# Patient Record
Sex: FEMALE | Race: BLACK OR AFRICAN AMERICAN | NOT HISPANIC OR LATINO | Employment: OTHER | URBAN - METROPOLITAN AREA
[De-identification: names, ages, dates, MRNs, and addresses within clinical notes are randomized per-mention and may not be internally consistent; named-entity substitution may affect disease eponyms.]

---

## 2018-10-31 ENCOUNTER — GYNECOLOGY VISIT (OUTPATIENT)
Dept: OBGYN | Facility: MEDICAL CENTER | Age: 68
End: 2018-10-31
Payer: MEDICARE

## 2018-10-31 ENCOUNTER — HOSPITAL ENCOUNTER (OUTPATIENT)
Dept: LAB | Facility: MEDICAL CENTER | Age: 68
End: 2018-10-31
Attending: OBSTETRICS & GYNECOLOGY
Payer: MEDICARE

## 2018-10-31 ENCOUNTER — HOSPITAL ENCOUNTER (OUTPATIENT)
Facility: MEDICAL CENTER | Age: 68
End: 2018-10-31
Attending: OBSTETRICS & GYNECOLOGY
Payer: MEDICARE

## 2018-10-31 VITALS
DIASTOLIC BLOOD PRESSURE: 110 MMHG | WEIGHT: 170 LBS | SYSTOLIC BLOOD PRESSURE: 170 MMHG | HEIGHT: 63 IN | BODY MASS INDEX: 30.12 KG/M2

## 2018-10-31 DIAGNOSIS — Z12.4 PAP SMEAR FOR CERVICAL CANCER SCREENING: ICD-10-CM

## 2018-10-31 DIAGNOSIS — Z12.39 SCREENING FOR MALIGNANT NEOPLASM OF BREAST: ICD-10-CM

## 2018-10-31 DIAGNOSIS — N95.0 POST-MENOPAUSAL BLEEDING: ICD-10-CM

## 2018-10-31 DIAGNOSIS — I10 HYPERTENSION, UNSPECIFIED TYPE: ICD-10-CM

## 2018-10-31 DIAGNOSIS — Z11.51 SCREENING FOR HUMAN PAPILLOMAVIRUS (HPV): ICD-10-CM

## 2018-10-31 DIAGNOSIS — Z11.3 SCREEN FOR STD (SEXUALLY TRANSMITTED DISEASE): ICD-10-CM

## 2018-10-31 LAB
ERYTHROCYTE [DISTWIDTH] IN BLOOD BY AUTOMATED COUNT: 43.2 FL (ref 35.9–50)
HCT VFR BLD AUTO: 34.9 % (ref 37–47)
HGB BLD-MCNC: 10.8 G/DL (ref 12–16)
MCH RBC QN AUTO: 23.1 PG (ref 27–33)
MCHC RBC AUTO-ENTMCNC: 30.9 G/DL (ref 33.6–35)
MCV RBC AUTO: 74.7 FL (ref 81.4–97.8)
PLATELET # BLD AUTO: 291 K/UL (ref 164–446)
PMV BLD AUTO: 9.9 FL (ref 9–12.9)
RBC # BLD AUTO: 4.67 M/UL (ref 4.2–5.4)
WBC # BLD AUTO: 6.4 K/UL (ref 4.8–10.8)

## 2018-10-31 PROCEDURE — 85027 COMPLETE CBC AUTOMATED: CPT

## 2018-10-31 PROCEDURE — 84443 ASSAY THYROID STIM HORMONE: CPT | Mod: GA

## 2018-10-31 PROCEDURE — 36415 COLL VENOUS BLD VENIPUNCTURE: CPT

## 2018-10-31 PROCEDURE — 87491 CHLMYD TRACH DNA AMP PROBE: CPT | Mod: GZ

## 2018-10-31 PROCEDURE — 88175 CYTOPATH C/V AUTO FLUID REDO: CPT

## 2018-10-31 PROCEDURE — 87624 HPV HI-RISK TYP POOLED RSLT: CPT

## 2018-10-31 PROCEDURE — 99204 OFFICE O/P NEW MOD 45 MIN: CPT | Performed by: OBSTETRICS & GYNECOLOGY

## 2018-10-31 PROCEDURE — 87591 N.GONORRHOEAE DNA AMP PROB: CPT | Mod: GZ

## 2018-10-31 NOTE — PROGRESS NOTES
"Subjective:      Graciela Desai is a 68 y.o. female who presents with New Patient (GYN Visit PMB/Hyperplasia)        CC: postmenopausal bleeding    HPI: 67 yo  menopausal female presents for evaluation of postmenopausal bleeding.  She is a very poor historian.  She states she has been bleeding since , and unsure if she ever went through menopause.  She has not had any bleeding over the past month. I have very limited records in EMR which state she has a diagnosis of complex endometrial hyperplasia without atypia.  She is unsure when this biopsy was done.  She states she has had a Mirena IUD within the past year, but had it removed due to heavy bleeding and pain shortly after it was inserted.      She denies hx of htn (but BP is 170/110 today).  She reports intermittent chest pain, sob, and headaches, but none currently.  Also reports being told she had \"pre-diabetes\".      ROS REVIEW OF SYSTEMS:    Pertinent positives and negatives mentioned in HPI.    All other systems reviewed and are negative or noncontributory.       Objective:     BP (!) 170/110 (BP Location: Left arm)   Ht 1.6 m (5' 3\")   Wt 77.1 kg (170 lb)   BMI 30.11 kg/m²      Physical Exam      GENERAL: Alert, in no apparent distress  PSYCHIATRIC: Appropriate affect, intact insight and judgement.    NECK:  Nontender, no masses.  No Thyromegaly or nodules. No lymphadenopathy.  RESPIRATORY: Normal respiratory effort.  Lungs clear to auscultation.   CARDIOVASCULAR: RRR, no murmur, gallop, or rub.  ABDOMEN: Soft, nontender, nondistended.  No palpable masses.  No rebound or guarding.  No inguinal lymphadenopathy.  No hepatosplenomegaly.  No hernias.  BACK: No CVA tenderness  EXTREMITIES: No edema  SKIN: No rash    BREAST: No masses or tenderness.  No skin changes.  No nipple inversion or discharge. No axillary lymphadenopathy.      GENITOURINARY:  Normal external genitalia, no lesions.  Normal urethral meatus, no masses or tenderness.  Normal bladder " without fullness or masses.  Vagina atrophic, no vaginal discharge or lesions.  Cervix without lesions or discharge, nontender.  Uterus 14 wks size,  Irregular contour, nontender.  Adnexa nontender, no masses.  Normal anus and perineum.    Rectal Exam - not indicated.       Assessment/Plan:     1. Post-menopausal bleeding  Appears to have had complex endometrial hyperplasia without atypia - I am unclear when the last biopsy was performed, and she appears to not be on any treatment other than the failed Mirena IUD.  I explained that we will need to do an embx, and she will likely need surgery.  She does not want surgery under any circumstances.   Will discuss further after biopsy results. I explained that there is a good chance she may have cancer of the endometrium.     - THINPREP PAP W/HPV AND CTNG; Future  - CBC WITHOUT DIFFERENTIAL; Future  - TSH; Future  - US-PELVIC COMPLETE (TRANSABDOMINAL/TRANSVAGINAL) (COMBO); Future  - REFERRAL TO GYNECOLOGY    2. Screening for human papillomavirus (HPV)  - THINPREP PAP W/HPV AND CTNG; Future    3. Pap smear for cervical cancer screening  - THINPREP PAP W/HPV AND CTNG; Future    4. Screen for STD (sexually transmitted disease)  - THINPREP PAP W/HPV AND CTNG; Future    5. Screening for malignant neoplasm of breast  - MA-SCREENING MAMMO BILAT W/TOMOSYNTHESIS W/CAD; Future    6. Hypertension, unspecified type  Urgent referral placed to primary care to establish care and treat hypertension.  - REFERRAL TO FOLLOW-UP WITH PRIMARY CARE    F/U 1 week for embx.

## 2018-10-31 NOTE — PROGRESS NOTES
Pt presents as new gyn pt to further evaluate PMB/Complex hyperplasia per records from [place]. Pt states she has been bleeding off and on since 2006, per records 2016. Pt denies med hx including HTN,states no current meds. Had mirena at some point had to be removed. Did not improve bleeding.

## 2018-11-01 DIAGNOSIS — N95.0 POST-MENOPAUSAL BLEEDING: ICD-10-CM

## 2018-11-01 DIAGNOSIS — Z11.51 SCREENING FOR HUMAN PAPILLOMAVIRUS (HPV): ICD-10-CM

## 2018-11-01 DIAGNOSIS — Z12.4 PAP SMEAR FOR CERVICAL CANCER SCREENING: ICD-10-CM

## 2018-11-01 DIAGNOSIS — Z11.3 SCREEN FOR STD (SEXUALLY TRANSMITTED DISEASE): ICD-10-CM

## 2018-11-01 LAB — TSH SERPL DL<=0.005 MIU/L-ACNC: 2.21 UIU/ML (ref 0.38–5.33)

## 2018-11-02 LAB
C TRACH DNA GENITAL QL NAA+PROBE: NEGATIVE
CYTOLOGY REG CYTOL: NORMAL
HPV HR 12 DNA CVX QL NAA+PROBE: NEGATIVE
HPV16 DNA SPEC QL NAA+PROBE: NEGATIVE
HPV18 DNA SPEC QL NAA+PROBE: NEGATIVE
N GONORRHOEA DNA GENITAL QL NAA+PROBE: NEGATIVE
SPECIMEN SOURCE: NORMAL
SPECIMEN SOURCE: NORMAL

## 2018-11-05 ENCOUNTER — APPOINTMENT (OUTPATIENT)
Dept: RADIOLOGY | Facility: MEDICAL CENTER | Age: 68
End: 2018-11-05
Attending: OBSTETRICS & GYNECOLOGY
Payer: MEDICARE

## 2018-11-07 ENCOUNTER — HOSPITAL ENCOUNTER (OUTPATIENT)
Dept: RADIOLOGY | Facility: MEDICAL CENTER | Age: 68
End: 2018-11-07
Attending: OBSTETRICS & GYNECOLOGY
Payer: MEDICARE

## 2018-11-07 DIAGNOSIS — Z12.39 SCREENING FOR MALIGNANT NEOPLASM OF BREAST: ICD-10-CM

## 2018-11-08 ENCOUNTER — HOSPITAL ENCOUNTER (OUTPATIENT)
Dept: RADIOLOGY | Facility: MEDICAL CENTER | Age: 68
End: 2018-11-08
Attending: OBSTETRICS & GYNECOLOGY
Payer: MEDICARE

## 2018-11-08 DIAGNOSIS — N95.0 POST-MENOPAUSAL BLEEDING: ICD-10-CM

## 2018-11-08 PROCEDURE — 76830 TRANSVAGINAL US NON-OB: CPT

## 2018-11-09 ENCOUNTER — OFFICE VISIT (OUTPATIENT)
Dept: MEDICAL GROUP | Facility: MEDICAL CENTER | Age: 68
End: 2018-11-09
Payer: MEDICARE

## 2018-11-09 ENCOUNTER — HOSPITAL ENCOUNTER (OUTPATIENT)
Dept: LAB | Facility: MEDICAL CENTER | Age: 68
End: 2018-11-09
Attending: NURSE PRACTITIONER
Payer: MEDICARE

## 2018-11-09 VITALS
HEIGHT: 63 IN | DIASTOLIC BLOOD PRESSURE: 92 MMHG | TEMPERATURE: 97.6 F | WEIGHT: 168 LBS | SYSTOLIC BLOOD PRESSURE: 162 MMHG | BODY MASS INDEX: 29.77 KG/M2 | OXYGEN SATURATION: 97 % | HEART RATE: 87 BPM | RESPIRATION RATE: 16 BRPM

## 2018-11-09 DIAGNOSIS — Z13.220 LIPID SCREENING: ICD-10-CM

## 2018-11-09 DIAGNOSIS — R73.03 PREDIABETES: ICD-10-CM

## 2018-11-09 DIAGNOSIS — I10 ESSENTIAL HYPERTENSION: ICD-10-CM

## 2018-11-09 DIAGNOSIS — R19.7 DIARRHEA, UNSPECIFIED TYPE: ICD-10-CM

## 2018-11-09 DIAGNOSIS — Z23 NEED FOR VACCINATION: ICD-10-CM

## 2018-11-09 LAB
ALBUMIN SERPL BCP-MCNC: 3.9 G/DL (ref 3.2–4.9)
ALBUMIN/GLOB SERPL: 1 G/DL
ALP SERPL-CCNC: 69 U/L (ref 30–99)
ALT SERPL-CCNC: 12 U/L (ref 2–50)
ANION GAP SERPL CALC-SCNC: 6 MMOL/L (ref 0–11.9)
AST SERPL-CCNC: 15 U/L (ref 12–45)
BILIRUB SERPL-MCNC: 0.4 MG/DL (ref 0.1–1.5)
BUN SERPL-MCNC: 21 MG/DL (ref 8–22)
CALCIUM SERPL-MCNC: 9.6 MG/DL (ref 8.5–10.5)
CHLORIDE SERPL-SCNC: 107 MMOL/L (ref 96–112)
CHOLEST SERPL-MCNC: 219 MG/DL (ref 100–199)
CO2 SERPL-SCNC: 28 MMOL/L (ref 20–33)
CREAT SERPL-MCNC: 1.08 MG/DL (ref 0.5–1.4)
CREAT UR-MCNC: 75.8 MG/DL
FASTING STATUS PATIENT QL REPORTED: NORMAL
GLOBULIN SER CALC-MCNC: 3.8 G/DL (ref 1.9–3.5)
GLUCOSE SERPL-MCNC: 96 MG/DL (ref 65–99)
HDLC SERPL-MCNC: 61 MG/DL
LDLC SERPL CALC-MCNC: 129 MG/DL
MICROALBUMIN UR-MCNC: <0.7 MG/DL
MICROALBUMIN/CREAT UR: NORMAL MG/G (ref 0–30)
POTASSIUM SERPL-SCNC: 4.4 MMOL/L (ref 3.6–5.5)
PROT SERPL-MCNC: 7.7 G/DL (ref 6–8.2)
SODIUM SERPL-SCNC: 141 MMOL/L (ref 135–145)
TRIGL SERPL-MCNC: 147 MG/DL (ref 0–149)

## 2018-11-09 PROCEDURE — 90662 IIV NO PRSV INCREASED AG IM: CPT | Performed by: NURSE PRACTITIONER

## 2018-11-09 PROCEDURE — 80061 LIPID PANEL: CPT

## 2018-11-09 PROCEDURE — 83036 HEMOGLOBIN GLYCOSYLATED A1C: CPT | Mod: GA

## 2018-11-09 PROCEDURE — 99204 OFFICE O/P NEW MOD 45 MIN: CPT | Mod: 25 | Performed by: NURSE PRACTITIONER

## 2018-11-09 PROCEDURE — G0009 ADMIN PNEUMOCOCCAL VACCINE: HCPCS | Performed by: NURSE PRACTITIONER

## 2018-11-09 PROCEDURE — 82570 ASSAY OF URINE CREATININE: CPT

## 2018-11-09 PROCEDURE — G0008 ADMIN INFLUENZA VIRUS VAC: HCPCS | Performed by: NURSE PRACTITIONER

## 2018-11-09 PROCEDURE — 82043 UR ALBUMIN QUANTITATIVE: CPT

## 2018-11-09 PROCEDURE — 80053 COMPREHEN METABOLIC PANEL: CPT

## 2018-11-09 PROCEDURE — 36415 COLL VENOUS BLD VENIPUNCTURE: CPT

## 2018-11-09 PROCEDURE — 90670 PCV13 VACCINE IM: CPT | Performed by: NURSE PRACTITIONER

## 2018-11-09 RX ORDER — LOSARTAN POTASSIUM 50 MG/1
50 TABLET ORAL DAILY
Qty: 90 TAB | Refills: 0 | Status: SHIPPED | OUTPATIENT
Start: 2018-11-09 | End: 2020-01-30 | Stop reason: SDUPTHER

## 2018-11-09 ASSESSMENT — PATIENT HEALTH QUESTIONNAIRE - PHQ9: CLINICAL INTERPRETATION OF PHQ2 SCORE: 0

## 2018-11-09 NOTE — PROGRESS NOTES
"Chief Complaint   Patient presents with   • Establish Care     PARASITE IN BODY            • Tingling     FINGERS AND TOES     Graciela Desai is a 68 y.o. female here to establish care    Essential hypertension  New diagnosis.  BP at visit last week 170/110, 162/92 in the office today mother with HTN  She is having headaches, sometimes dizziness and fatigue.  Denies chest pain, palpitations, epistaxis, activity intolerance    Prediabetes  Reports having been diagnosed with prediabetes in the past, she was never prescribed medication.  She has been working on her diet, active but no planned exercise.  Denies polyuria, polydipsia, numbness or tingling in extremities    Diarrhea  She reports loose stools 6-7 times daily, ongoing for the last few years.  She had colonoscopy in 2017 which was normal, records requested.  She voices concern about a \"parasite\", states that she feels it throughout her body, \"under her skin\", feels like a tingling sensation under her fingers.  No abdominal pain, bloating, blood or mucus in stool.  No recent travel outside of the [place].  No fever    She recently had labs which showed mild anemia.  She has been having some dysfunctional uterine bleeding, is being followed by the OB/GYN for this.  She states that her bleeding has stopped at this time  Normal thyroid level    Current medicines (including changes today)  Current Outpatient Prescriptions   Medication Sig Dispense Refill   • losartan (COZAAR) 50 MG Tab Take 1 Tab by mouth every day. 90 Tab 0     No current facility-administered medications for this visit.      She  has a past medical history of Hypertension.  She  has a past surgical history that includes primary c section and appendectomy.  Social History   Substance Use Topics   • Smoking status: Never Smoker   • Smokeless tobacco: Never Used   • Alcohol use No     Social History     Social History Narrative   • No narrative on file     Family History   Problem Relation Age of Onset " "  • Alcohol/Drug Mother    • Heart Disease Mother    • No Known Problems Father      Family Status   Relation Status   • Mo    • Fa (Not Specified)   • Bro Alive         ROS  Problems listed discussed above, all other systems reviewed and negative     Objective:     Blood pressure (!) 162/92, pulse 87, temperature 36.4 °C (97.6 °F), temperature source Temporal, resp. rate 16, height 1.6 m (5' 3\"), weight 76.2 kg (168 lb), SpO2 97 %. Body mass index is 29.76 kg/m².  Physical Exam:  General: Alert, oriented in no acute distress.  Eye contact is good, speech is normal, affect calm  HEENT: perrl, Oral mucosa pink moist, no lesions. Nares patent. TMs gray with good landmarks bilaterally. No cervical or supraclavicular lymphadenopathy, thyroid isthmus palpable without masses or nodules.  Lungs: clear to auscultation bilaterally, good aeration, normal effort. No wheeze/ rhonchi/ rales.  CV: regular rate and rhythm, S1, S2. No murmur, no JVD, no edema. PMI at 5th intercostal space midclavicular line. Pedal pulses 2 + bilaterally  Abdomen: soft, nontender, BS x4, no hepatosplenomegaly.  Ext: color normal, vascularity normal, temperature normal. No rash or lesions.  MS: No joint swelling or redness. Strength is 5/5 globally  Neuro: DTR 2+ bilaterally  Assessment and Plan:   The following treatment plan was discussed  1. Essential hypertension   uncontrolled, new diagnosis.  Start losartan obtain labs.  Follow-up in 2 weeks.  Discussed importance of regular exercise, sodium reduction, avoid alcohol and tobacco products.  COMP METABOLIC PANEL    MICROALBUMIN CREAT RATIO URINE    losartan (COZAAR) 50 MG Tab   2. Prediabetes   reportedly diagnosed with prediabetes in the past, no medications  HEMOGLOBIN A1C   3. Diarrhea, unspecified type   several loose stool daily, history of normal colonoscopy.  Records requested.  Stool testing as listed below.  Discussed keeping food log to identify potential triggers.  Explained " that parasitic infection is very unlikely  CULTURE STOOL    Complete O&P    CDIFF BY PCR   4. Lipid screening  Lipid Profile   5. Need for vaccination  I have placed the below orders and discussed them with an approved delegating provider. The MA is performing the below orders under the direction of Dr. Keane  Influenza Vaccine, High Dose (65+ Only)    Pneumococcal Conjugate Vaccine 13-Valent       Educated in proper administration of medication(s) ordered today including safety, possible SE, risks, benefits, rationale and alternatives to therapy.     Followup: Return in about 2 weeks (around 11/23/2018).             Please note that this dictation was created using voice recognition software. I have worked with consultants from the vendor as well as technical experts from [employer] to optimize the interface. I have made every reasonable attempt to correct obvious errors, but I expect that there are errors of grammar and possibly content that I did not discover before finalizing the note.

## 2018-11-09 NOTE — LETTER
[facility]  SAGE Butler.  [address]  [facility] [address]  Fax: [phone]   Authorization for Release/Disclosure of   Protected Health Information   Name: NOAH BATISTA : 1950 SSN: [id number]   Address: [address] [place] [address] Phone:    [phone] (home)    I authorize the entity listed below to release/disclose the PHI below to:   [facility]/ZOHAIB Butler and ZOHAIB Butler   Provider or Entity Name:  Dr Dominguez, GI specialist   Address   City, [facility], [place], [place] Phone:      Fax:     Reason for request: continuity of care   Information to be released:    [ x] LAST COLONOSCOPY,  including any PATH REPORT and follow-up  [  ] LAST FIT/COLOGUARD RESULT [  ] LAST DEXA  [  ] LAST MAMMOGRAM  [  ] LAST PAP  [  ] LAST LABS [  ] RETINA EXAM REPORT  [  ] IMMUNIZATION RECORDS  [  ] Release all info      [  ] Check here and initial the line next to each item to release ALL health information INCLUDING  _____ Care and treatment for drug and / or alcohol abuse  _____ HIV testing, infection status, or AIDS  _____ Genetic Testing    DATES OF SERVICE OR TIME PERIOD TO BE DISCLOSED: _____________  I understand and acknowledge that:  * This Authorization may be revoked at any time by you in writing, except if your health information has already been used or disclosed.  * Your health information that will be used or disclosed as a result of you signing this authorization could be re-disclosed by the recipient. If this occurs, your re-disclosed health information may no longer be protected by State or Federal laws.  * You may refuse to sign this Authorization. Your refusal will not affect your ability to obtain treatment.  * This Authorization becomes effective upon signing and will  on (date) __________.      If no date is indicated, this Authorization will  one (1) year from the signature date.    Name: Noah Batista    Signature:   Date:          11/9/2018       PLEASE FAX REQUESTED RECORDS BACK TO: [phone]

## 2018-11-09 NOTE — ASSESSMENT & PLAN NOTE
Reports having been diagnosed with prediabetes in the past, she was never prescribed medication.  She has been working on her diet, active but no planned exercise.  Denies polyuria, polydipsia, numbness or tingling in extremities

## 2018-11-09 NOTE — ASSESSMENT & PLAN NOTE
New diagnosis.  BP at visit last week 170/110, 162/92 in the office today mother with HTN  She is having headaches, sometimes dizziness and fatigue.  Denies chest pain, palpitations, epistaxis, activity intolerance

## 2018-11-09 NOTE — ASSESSMENT & PLAN NOTE
"She reports loose stools 6-7 times daily, ongoing for the last few years.  She had colonoscopy in 2017 which was normal, records requested.  She voices concern about a \"parasite\", states that she feels it throughout her body, \"under her skin\".  No abdominal pain, bloating, blood or mucus in stool.  No recent travel outside of the [place].  No fever  "

## 2018-11-10 LAB
EST. AVERAGE GLUCOSE BLD GHB EST-MCNC: 140 MG/DL
HBA1C MFR BLD: 6.5 % (ref 0–5.6)

## 2018-11-13 ENCOUNTER — HOSPITAL ENCOUNTER (OUTPATIENT)
Facility: MEDICAL CENTER | Age: 68
End: 2018-11-13
Attending: NURSE PRACTITIONER
Payer: MEDICARE

## 2018-11-13 DIAGNOSIS — R19.7 DIARRHEA, UNSPECIFIED TYPE: ICD-10-CM

## 2018-11-13 LAB
G LAMBLIA+C PARVUM AG STL QL RAPID: NORMAL
SIGNIFICANT IND 70042: NORMAL
SITE SITE: NORMAL
SOURCE SOURCE: NORMAL

## 2018-11-13 PROCEDURE — 87046 STOOL CULTR AEROBIC BACT EA: CPT

## 2018-11-13 PROCEDURE — 87329 GIARDIA AG IA: CPT

## 2018-11-13 PROCEDURE — 87899 AGENT NOS ASSAY W/OPTIC: CPT

## 2018-11-13 PROCEDURE — 87045 FECES CULTURE AEROBIC BACT: CPT

## 2018-11-13 PROCEDURE — 87493 C DIFF AMPLIFIED PROBE: CPT

## 2018-11-13 PROCEDURE — 87328 CRYPTOSPORIDIUM AG IA: CPT

## 2018-11-14 LAB
C DIFF DNA SPEC QL NAA+PROBE: NEGATIVE
C DIFF TOX GENS STL QL NAA+PROBE: NEGATIVE
E COLI SXT1+2 STL IA: NORMAL
SIGNIFICANT IND 70042: NORMAL
SITE SITE: NORMAL
SOURCE SOURCE: NORMAL

## 2018-11-16 LAB
BACTERIA STL CULT: NORMAL
E COLI SXT1+2 STL IA: NORMAL
SIGNIFICANT IND 70042: NORMAL
SITE SITE: NORMAL
SOURCE SOURCE: NORMAL

## 2018-11-19 ENCOUNTER — TELEPHONE (OUTPATIENT)
Dept: MEDICAL GROUP | Facility: MEDICAL CENTER | Age: 68
End: 2018-11-19

## 2018-11-19 NOTE — TELEPHONE ENCOUNTER
----- Message from ZOHAIB Butler sent at 11/16/2018 12:06 PM PST -----  Please inform pt all stool tests are normal. No indication of infectious cause for her diarrhea. We will discuss further at her visit 11/26

## 2018-11-26 ENCOUNTER — OFFICE VISIT (OUTPATIENT)
Dept: MEDICAL GROUP | Facility: MEDICAL CENTER | Age: 68
End: 2018-11-26
Payer: MEDICARE

## 2018-11-26 VITALS
RESPIRATION RATE: 16 BRPM | TEMPERATURE: 98 F | HEART RATE: 87 BPM | HEIGHT: 63 IN | OXYGEN SATURATION: 97 % | WEIGHT: 172.51 LBS | DIASTOLIC BLOOD PRESSURE: 90 MMHG | BODY MASS INDEX: 30.57 KG/M2 | SYSTOLIC BLOOD PRESSURE: 120 MMHG

## 2018-11-26 DIAGNOSIS — N18.30 STAGE 3 CHRONIC KIDNEY DISEASE (HCC): ICD-10-CM

## 2018-11-26 DIAGNOSIS — I10 ESSENTIAL HYPERTENSION: ICD-10-CM

## 2018-11-26 DIAGNOSIS — E78.00 PURE HYPERCHOLESTEROLEMIA: ICD-10-CM

## 2018-11-26 DIAGNOSIS — R73.03 PREDIABETES: ICD-10-CM

## 2018-11-26 PROCEDURE — 99214 OFFICE O/P EST MOD 30 MIN: CPT | Performed by: NURSE PRACTITIONER

## 2018-11-26 RX ORDER — HYDROCHLOROTHIAZIDE 12.5 MG/1
12.5 CAPSULE, GELATIN COATED ORAL DAILY
Qty: 90 CAP | Refills: 1 | Status: SHIPPED | OUTPATIENT
Start: 2018-11-26 | End: 2020-01-30 | Stop reason: SDUPTHER

## 2018-11-26 NOTE — ASSESSMENT & PLAN NOTE
She has started losartan 50 mg daily with significant improvement in her blood pressure although diastolic reading is still 90.  No chest pain, dizziness, palpitation

## 2018-11-26 NOTE — ASSESSMENT & PLAN NOTE
a1c 6.5  She is not following any particular diet.  Not exercising.  No polyuria, polydipsia, numbness or tingling in extremities

## 2018-11-26 NOTE — ASSESSMENT & PLAN NOTE
Component      Latest Ref Rng & Units 11/9/2018          11:17 AM   Cholesterol,Tot      100 - 199 mg/dL 219 (H)   Triglycerides      0 - 149 mg/dL 147   HDL      >=40 mg/dL 61   LDL      <100 mg/dL 129 (H)   States that she eats out on a regular basis, enjoys going to the [employer]

## 2018-11-26 NOTE — PROGRESS NOTES
"Subjective:     Chief Complaint   Patient presents with   • Follow-Up     Graciela Desai is a 68 y.o. female here today to follow up on:    Prediabetes  a1c 6.5  She is not following any particular diet.  Not exercising.  No polyuria, polydipsia, numbness or tingling in extremities    Essential hypertension  She has started losartan 50 mg daily with significant improvement in her blood pressure although diastolic reading is still 90.  No chest pain, dizziness, palpitation.  She does have some pedal edema    Pure hypercholesterolemia  Component      Latest Ref Rng & Units 11/9/2018          11:17 AM   Cholesterol,Tot      100 - 199 mg/dL 219 (H)   Triglycerides      0 - 149 mg/dL 147   HDL      >=40 mg/dL 61   LDL      <100 mg/dL 129 (H)   States that she eats out on a regular basis, enjoys going to the [employer]     Stage 3 chronic kidney disease (HCC)  Recent labs with GFR 51       Current medicines (including changes today)  Current Outpatient Prescriptions   Medication Sig Dispense Refill   • hydrochlorothiazide (MICROZIDE) 12.5 MG capsule Take 1 Cap by mouth every day. 90 Cap 1   • losartan (COZAAR) 50 MG Tab Take 1 Tab by mouth every day. 90 Tab 0     No current facility-administered medications for this visit.      She  has a past medical history of Hypertension.    ROS included above     Objective:     Blood pressure 120/90, pulse 87, temperature 36.7 °C (98 °F), temperature source Temporal, resp. rate 16, height 1.6 m (5' 3\"), weight 78.3 kg (172 lb 8.2 oz), SpO2 97 %. Body mass index is 30.56 kg/m².     Physical Exam:  General: Alert, oriented in no acute distress.  Eye contact is good, speech is normal, affect calm  Lungs: clear to auscultation bilaterally, normal effort, no wheeze/ rhonchi/ rales.  CV: regular rate and rhythm, S1, S2, no murmur.  1+ bilateral lower extremity edema  Abdomen: soft, nontender, no hepatosplenomegaly  Ext:  color normal, vascularity normal, temperature normal    Assessment " and Plan:   The following treatment plan was discussed   1. Prediabetes   counseled on diet, exercise, weight loss recommendations.  She is motivated to work on lifestyle factors.  We will plan for repeat labs in 6 months  COMP METABOLIC PANEL    HEMOGLOBIN A1C   2. Pure hypercholesterolemia   same as above  Lipid Profile   3. Essential hypertension   improved with losartan although not quite at goal.  She is experiencing some lower extremity edema, will add a low-dose of hydrochlorothiazide  hydrochlorothiazide (MICROZIDE) 12.5 MG capsule   4. Stage 3 chronic kidney disease (HCC)   advised to avoid NSAIDs.  Continue to monitor       Followup: 6 months with labs prior         Please note that this dictation was created using voice recognition software. I have worked with consultants from the vendor as well as technical experts from [employer] to optimize the interface. I have made every reasonable attempt to correct obvious errors, but I expect that there are errors of grammar and possibly content that I did not discover before finalizing the note.

## 2018-12-04 ENCOUNTER — HOSPITAL ENCOUNTER (OUTPATIENT)
Dept: RADIOLOGY | Facility: MEDICAL CENTER | Age: 68
End: 2018-12-04
Attending: OBSTETRICS & GYNECOLOGY
Payer: MEDICARE

## 2018-12-04 DIAGNOSIS — N63.10 LUMP OF RIGHT BREAST: ICD-10-CM

## 2018-12-04 DIAGNOSIS — N64.4 BREAST PAIN: ICD-10-CM

## 2018-12-04 PROCEDURE — 76642 ULTRASOUND BREAST LIMITED: CPT | Mod: RT

## 2018-12-04 PROCEDURE — G0279 TOMOSYNTHESIS, MAMMO: HCPCS

## 2019-05-16 ENCOUNTER — HOSPITAL ENCOUNTER (OUTPATIENT)
Dept: LAB | Facility: MEDICAL CENTER | Age: 69
End: 2019-05-16
Attending: NURSE PRACTITIONER
Payer: MEDICARE

## 2019-05-16 DIAGNOSIS — E78.00 PURE HYPERCHOLESTEROLEMIA: ICD-10-CM

## 2019-05-16 DIAGNOSIS — R73.03 PREDIABETES: ICD-10-CM

## 2019-05-16 LAB
EST. AVERAGE GLUCOSE BLD GHB EST-MCNC: 140 MG/DL
HBA1C MFR BLD: 6.5 % (ref 0–5.6)

## 2019-05-16 PROCEDURE — 80053 COMPREHEN METABOLIC PANEL: CPT

## 2019-05-16 PROCEDURE — 80061 LIPID PANEL: CPT

## 2019-05-16 PROCEDURE — 36415 COLL VENOUS BLD VENIPUNCTURE: CPT

## 2019-05-16 PROCEDURE — 83036 HEMOGLOBIN GLYCOSYLATED A1C: CPT

## 2019-05-17 LAB
ALBUMIN SERPL BCP-MCNC: 3.8 G/DL (ref 3.2–4.9)
ALBUMIN/GLOB SERPL: 1.1 G/DL
ALP SERPL-CCNC: 76 U/L (ref 30–99)
ALT SERPL-CCNC: 25 U/L (ref 2–50)
ANION GAP SERPL CALC-SCNC: 5 MMOL/L (ref 0–11.9)
AST SERPL-CCNC: 32 U/L (ref 12–45)
BILIRUB SERPL-MCNC: 0.6 MG/DL (ref 0.1–1.5)
BUN SERPL-MCNC: 10 MG/DL (ref 8–22)
CALCIUM SERPL-MCNC: 9.3 MG/DL (ref 8.5–10.5)
CHLORIDE SERPL-SCNC: 107 MMOL/L (ref 96–112)
CHOLEST SERPL-MCNC: 205 MG/DL (ref 100–199)
CO2 SERPL-SCNC: 29 MMOL/L (ref 20–33)
CREAT SERPL-MCNC: 0.91 MG/DL (ref 0.5–1.4)
FASTING STATUS PATIENT QL REPORTED: NORMAL
GLOBULIN SER CALC-MCNC: 3.4 G/DL (ref 1.9–3.5)
GLUCOSE SERPL-MCNC: 92 MG/DL (ref 65–99)
HDLC SERPL-MCNC: 62 MG/DL
LDLC SERPL CALC-MCNC: 116 MG/DL
POTASSIUM SERPL-SCNC: 3.1 MMOL/L (ref 3.6–5.5)
PROT SERPL-MCNC: 7.2 G/DL (ref 6–8.2)
SODIUM SERPL-SCNC: 141 MMOL/L (ref 135–145)
TRIGL SERPL-MCNC: 134 MG/DL (ref 0–149)

## 2019-05-19 ENCOUNTER — HOSPITAL ENCOUNTER (EMERGENCY)
Facility: MEDICAL CENTER | Age: 69
End: 2019-05-19
Attending: EMERGENCY MEDICINE
Payer: MEDICARE

## 2019-05-19 VITALS
WEIGHT: 165.34 LBS | RESPIRATION RATE: 18 BRPM | DIASTOLIC BLOOD PRESSURE: 98 MMHG | TEMPERATURE: 97.5 F | HEART RATE: 94 BPM | SYSTOLIC BLOOD PRESSURE: 135 MMHG | BODY MASS INDEX: 33.33 KG/M2 | OXYGEN SATURATION: 98 % | HEIGHT: 59 IN

## 2019-05-19 DIAGNOSIS — T78.40XA ALLERGIC REACTION, INITIAL ENCOUNTER: ICD-10-CM

## 2019-05-19 PROCEDURE — 700102 HCHG RX REV CODE 250 W/ 637 OVERRIDE(OP): Performed by: EMERGENCY MEDICINE

## 2019-05-19 PROCEDURE — A9270 NON-COVERED ITEM OR SERVICE: HCPCS | Performed by: EMERGENCY MEDICINE

## 2019-05-19 PROCEDURE — 99283 EMERGENCY DEPT VISIT LOW MDM: CPT

## 2019-05-19 RX ORDER — DIPHENHYDRAMINE HCL 25 MG
25 TABLET ORAL ONCE
Status: COMPLETED | OUTPATIENT
Start: 2019-05-19 | End: 2019-05-19

## 2019-05-19 RX ADMIN — DIPHENHYDRAMINE HCL 25 MG: 25 TABLET ORAL at 10:36

## 2019-05-19 NOTE — ED PROVIDER NOTES
"ED Provider Note    CHIEF COMPLAINT  Chief Complaint   Patient presents with   • Rash     all over, since yesterday, itchy patches   • Facial Swelling     left eye       HPI  Graciela Desai is a 69 y.o. female who presents with itching all over her body and swelling around the left eye.  Symptoms started yesterday.  She feels itchiness over her shoulders arms upper chest and thighs.  She states that she noticed some lumps or hives, but these seem to be improving.  When she woke up this morning she had some mild swelling around the left eye and therefore came in.  No eye pain.  No trauma.  No swelling of her lips or tongue.  No difficulty breathing or cough.  She is currently on hydrochlorothiazide and losartan she reports she has been on these for over a year.  No new medications or new exposures.  She [place] a casino [place] and thinks that she might of been around something she is allergic to.  She did have some peanut butter.  No other nuts or berries.  No shellfish.    REVIEW OF SYSTEMS  Pertinent negative: As above.    PAST MEDICAL HISTORY  Past Medical History:   Diagnosis Date   • Hypertension        SOCIAL HISTORY  Social History   Substance Use Topics   • Smoking status: Never Smoker   • Smokeless tobacco: Never Used   • Alcohol use No       SURGICAL HISTORY  Past Surgical History:   Procedure Laterality Date   • APPENDECTOMY     • PRIMARY C SECTION         ALLERGIES  No Known Allergies    PHYSICAL EXAM  VITAL SIGNS: /90   Pulse 93   Temp 36.4 °C (97.5 °F) (Temporal)   Resp 17   Ht 1.499 m (4' 11\")   Wt 75 kg (165 lb 5.5 oz)   SpO2 97%   BMI 33.40 kg/m²    Constitutional: Awake and alert. Nontoxic  HENT: Oropharynx is benign.  No edema of the lips or tongue or mucous membranes.  Eyes: Minimal swelling of the left upper and left lower eyelid.  The conjunctive is normal.  Right eye is normal.  Pupils equal round and reactive  Neck: Normal range of motion  Cardiovascular: Normal heart rate "   Thorax & Lungs: No respiratory distress, lung fields are clear without wheezes rales or rhonchi  Abdomen: Nontender  Skin: She has a few urticarial wheals of her bilateral thighs.  Describes itchiness of the upper extremities although no rashes appreciated.  No rash over the chest or face.  Extremities: Well perfused  Psychiatric: Affect normal    COURSE & MEDICAL DECISION MAKING  Patient presents with signs and symptoms most compatible with an acute allergic reaction.  Etiology of her reaction is not clear.  She has been on her current medications for quite some time now.  Perhaps it is related to the peanut butter.  One cannot say.  At this point she states that her rash is getting better.  I can only see a couple hives on bilateral thighs.  I do not see any other rash on the extremities chest or back.  She does not have any swelling of her lips tongue or mouth.  No wheezing.  There is some mild edema around the left eye and this will be treated with Benadryl.  Of advised 25 mg Benadryl every 4-6 hours over the next 2 days.  She actually has an appointment with her primary provider tomorrow.  I have encouraged her to keep this.  I precautioned her to return the ER for any swelling of the lips, debility breathing, worsening symptoms, not improving or concern.    FINAL IMPRESSION  1.  Acute allergic reaction      Disposition: home in good condition      This dictation was created using voice recognition software. The accuracy of the dictation is limited to the abilities of the software.  The nursing notes were reviewed and certain aspects of this information were incorporated into this note.      Electronically signed by: Pk Iniguez, 5/19/2019 10:36 AM

## 2019-05-19 NOTE — ED TRIAGE NOTES
Pt amb to triage.  Chief Complaint   Patient presents with   • Rash     all over, since yesterday, itchy patches   • Facial Swelling     left eye     States she was at a casino yesterday and is concerned she was exposed to something.

## 2019-05-19 NOTE — ED NOTES
The patient states her pain and itching has decreased. No acute distress noted. Respirations non labored. Skin warm and dry. Patient ambulatory with steady gait to waiting room to await ride home from .

## 2019-05-20 ENCOUNTER — APPOINTMENT (OUTPATIENT)
Dept: MEDICAL GROUP | Facility: MEDICAL CENTER | Age: 69
End: 2019-05-20
Payer: MEDICARE

## 2020-01-30 ENCOUNTER — OFFICE VISIT (OUTPATIENT)
Dept: MEDICAL GROUP | Facility: MEDICAL CENTER | Age: 70
End: 2020-01-30
Payer: MEDICARE

## 2020-01-30 ENCOUNTER — HOSPITAL ENCOUNTER (OUTPATIENT)
Dept: LAB | Facility: MEDICAL CENTER | Age: 70
End: 2020-01-30
Attending: FAMILY MEDICINE
Payer: MEDICARE

## 2020-01-30 VITALS
BODY MASS INDEX: 32.2 KG/M2 | TEMPERATURE: 98 F | SYSTOLIC BLOOD PRESSURE: 160 MMHG | RESPIRATION RATE: 12 BRPM | DIASTOLIC BLOOD PRESSURE: 94 MMHG | OXYGEN SATURATION: 95 % | WEIGHT: 164 LBS | HEART RATE: 88 BPM | HEIGHT: 60 IN

## 2020-01-30 DIAGNOSIS — Z23 NEED FOR VACCINATION: ICD-10-CM

## 2020-01-30 DIAGNOSIS — I10 ESSENTIAL HYPERTENSION: ICD-10-CM

## 2020-01-30 DIAGNOSIS — Z12.11 COLON CANCER SCREENING: ICD-10-CM

## 2020-01-30 DIAGNOSIS — E66.9 OBESITY (BMI 30.0-34.9): ICD-10-CM

## 2020-01-30 DIAGNOSIS — E78.2 MIXED HYPERLIPIDEMIA: ICD-10-CM

## 2020-01-30 DIAGNOSIS — R73.03 PREDIABETES: ICD-10-CM

## 2020-01-30 DIAGNOSIS — Z12.39 BREAST CANCER SCREENING: ICD-10-CM

## 2020-01-30 DIAGNOSIS — Z00.00 HEALTHCARE MAINTENANCE: ICD-10-CM

## 2020-01-30 LAB
ALBUMIN SERPL BCP-MCNC: 4 G/DL (ref 3.2–4.9)
ALBUMIN/GLOB SERPL: 1 G/DL
ALP SERPL-CCNC: 95 U/L (ref 30–99)
ALT SERPL-CCNC: 18 U/L (ref 2–50)
ANION GAP SERPL CALC-SCNC: 10 MMOL/L (ref 0–11.9)
AST SERPL-CCNC: 21 U/L (ref 12–45)
BASOPHILS # BLD AUTO: 1.1 % (ref 0–1.8)
BASOPHILS # BLD: 0.06 K/UL (ref 0–0.12)
BILIRUB SERPL-MCNC: 0.5 MG/DL (ref 0.1–1.5)
BUN SERPL-MCNC: 25 MG/DL (ref 8–22)
CALCIUM SERPL-MCNC: 9.7 MG/DL (ref 8.5–10.5)
CHLORIDE SERPL-SCNC: 108 MMOL/L (ref 96–112)
CHOLEST SERPL-MCNC: 204 MG/DL (ref 100–199)
CO2 SERPL-SCNC: 25 MMOL/L (ref 20–33)
CREAT SERPL-MCNC: 1 MG/DL (ref 0.5–1.4)
EOSINOPHIL # BLD AUTO: 0.12 K/UL (ref 0–0.51)
EOSINOPHIL NFR BLD: 2.1 % (ref 0–6.9)
ERYTHROCYTE [DISTWIDTH] IN BLOOD BY AUTOMATED COUNT: 42.6 FL (ref 35.9–50)
EST. AVERAGE GLUCOSE BLD GHB EST-MCNC: 128 MG/DL
GLOBULIN SER CALC-MCNC: 4 G/DL (ref 1.9–3.5)
GLUCOSE SERPL-MCNC: 114 MG/DL (ref 65–99)
HBA1C MFR BLD: 6.1 % (ref 0–5.6)
HCT VFR BLD AUTO: 38.3 % (ref 37–47)
HDLC SERPL-MCNC: 60 MG/DL
HGB BLD-MCNC: 11.7 G/DL (ref 12–16)
IMM GRANULOCYTES # BLD AUTO: 0.01 K/UL (ref 0–0.11)
IMM GRANULOCYTES NFR BLD AUTO: 0.2 % (ref 0–0.9)
LDLC SERPL CALC-MCNC: 122 MG/DL
LYMPHOCYTES # BLD AUTO: 1.33 K/UL (ref 1–4.8)
LYMPHOCYTES NFR BLD: 23.6 % (ref 22–41)
MCH RBC QN AUTO: 22.9 PG (ref 27–33)
MCHC RBC AUTO-ENTMCNC: 30.5 G/DL (ref 33.6–35)
MCV RBC AUTO: 74.8 FL (ref 81.4–97.8)
MONOCYTES # BLD AUTO: 0.44 K/UL (ref 0–0.85)
MONOCYTES NFR BLD AUTO: 7.8 % (ref 0–13.4)
NEUTROPHILS # BLD AUTO: 3.68 K/UL (ref 2–7.15)
NEUTROPHILS NFR BLD: 65.2 % (ref 44–72)
NRBC # BLD AUTO: 0 K/UL
NRBC BLD-RTO: 0 /100 WBC
PLATELET # BLD AUTO: 290 K/UL (ref 164–446)
PMV BLD AUTO: 9.4 FL (ref 9–12.9)
POTASSIUM SERPL-SCNC: 3.9 MMOL/L (ref 3.6–5.5)
PROT SERPL-MCNC: 8 G/DL (ref 6–8.2)
RBC # BLD AUTO: 5.12 M/UL (ref 4.2–5.4)
SODIUM SERPL-SCNC: 143 MMOL/L (ref 135–145)
TRIGL SERPL-MCNC: 108 MG/DL (ref 0–149)
TSH SERPL DL<=0.005 MIU/L-ACNC: 1.94 UIU/ML (ref 0.38–5.33)
WBC # BLD AUTO: 5.6 K/UL (ref 4.8–10.8)

## 2020-01-30 PROCEDURE — 80053 COMPREHEN METABOLIC PANEL: CPT

## 2020-01-30 PROCEDURE — 83036 HEMOGLOBIN GLYCOSYLATED A1C: CPT | Mod: GA

## 2020-01-30 PROCEDURE — 90732 PPSV23 VACC 2 YRS+ SUBQ/IM: CPT | Performed by: FAMILY MEDICINE

## 2020-01-30 PROCEDURE — G0009 ADMIN PNEUMOCOCCAL VACCINE: HCPCS | Performed by: FAMILY MEDICINE

## 2020-01-30 PROCEDURE — 84443 ASSAY THYROID STIM HORMONE: CPT

## 2020-01-30 PROCEDURE — 85025 COMPLETE CBC W/AUTO DIFF WBC: CPT

## 2020-01-30 PROCEDURE — 36415 COLL VENOUS BLD VENIPUNCTURE: CPT

## 2020-01-30 PROCEDURE — 90662 IIV NO PRSV INCREASED AG IM: CPT | Performed by: FAMILY MEDICINE

## 2020-01-30 PROCEDURE — 99204 OFFICE O/P NEW MOD 45 MIN: CPT | Mod: 25 | Performed by: FAMILY MEDICINE

## 2020-01-30 PROCEDURE — 80061 LIPID PANEL: CPT

## 2020-01-30 PROCEDURE — G0008 ADMIN INFLUENZA VIRUS VAC: HCPCS | Performed by: FAMILY MEDICINE

## 2020-01-30 RX ORDER — PRAVASTATIN SODIUM 10 MG
10 TABLET ORAL DAILY
Qty: 90 TAB | Refills: 2 | Status: ON HOLD | OUTPATIENT
Start: 2020-01-30 | End: 2021-04-09

## 2020-01-30 RX ORDER — HYDROCHLOROTHIAZIDE 12.5 MG/1
12.5 CAPSULE, GELATIN COATED ORAL DAILY
Qty: 90 CAP | Refills: 2 | Status: ON HOLD | OUTPATIENT
Start: 2020-01-30 | End: 2021-04-09

## 2020-01-30 RX ORDER — LOSARTAN POTASSIUM 50 MG/1
50 TABLET ORAL DAILY
Qty: 90 TAB | Refills: 2 | Status: ON HOLD | OUTPATIENT
Start: 2020-01-30 | End: 2021-04-09

## 2020-01-30 ASSESSMENT — PATIENT HEALTH QUESTIONNAIRE - PHQ9: CLINICAL INTERPRETATION OF PHQ2 SCORE: 0

## 2020-01-30 NOTE — PROGRESS NOTES
CC: New patient: Hypertension, prediabetes, hyperlipidemia,    HPI:  Graciela presents today to establish new PCP.    Patient has been active and independent with all ADLs.  Has the following chronic medical issues:    Essential hypertension  Blood pressure today is high.  However patient has been asymptomatic.  Denies any headache, chest pain, shortness of breath. Patient ran out of her blood pressure medication, needs refill.  She has been on hydrochlorothiazide 12.5 mg daily, and losartan 50 mg daily.  No side effects    Prediabetes  However last A1c was 6.5.  No history of diabetes.  Denies polyuria and polydipsia.  Patient is concerned about diabetes, has a strong family history of diabetes, and she promised to try to eat healthy.    Mixed hyperlipidemia  Last lipid panel was checked in May 2019:   Ref Range & Units 8mo ago   Cholesterol,Tot 100 - 199 mg/dL 205High     Triglycerides 0 - 149 mg/dL 134    HDL >=40 mg/dL 62    LDL <100 mg/dL 116High     Resulting Agency  [facility]     10 years cardiac risk is 17.1%.  Discussed with patient statin, and she agrees to take it.  No history of diabetes, however her last A1c was 6.5, no history of CAD or stroke.    Obesity (BMI 30.0-34.9)  BMI 32.03. Patient is counseled about the medical rationale for weight loss in obese individuals is that obesity is associated with a significant increase in mortality, and many health risks including type 2 diabetes mellitus, hypertension, dyslipidemia, and coronary heart disease. The benefits of weight loss include a reduction in the rate of progression from impaired glucose tolerance to diabetes, blood pressure in hypertensive patients, and lipid levels in higher risk patients. Other noncardiac benefits of weight loss include reductions in urinary incontinence, sleep apnea, and depression, and improvements in quality of life, physical functioning, and mobility.Recommend lifestyle modification: exercise 30 minutes per day 5 days per week.  Recommend also portion control.    Patient is due for mammogram, colonoscopy, flu and pneumonia shot.  Discussed bone density next visit.    Patient Active Problem List    Diagnosis Date Noted   • Pure hypercholesterolemia 2018   • Stage 3 chronic kidney disease (HCC) 2018   • Essential hypertension 2018   • Prediabetes 2018   • Diarrhea 2018       Current Outpatient Medications   Medication Sig Dispense Refill   • pravastatin (PRAVACHOL) 10 MG Tab Take 1 Tab by mouth every day. 90 Tab 2   • hydrochlorothiazide (MICROZIDE) 12.5 MG capsule Take 1 Cap by mouth every day. 90 Cap 2   • losartan (COZAAR) 50 MG Tab Take 1 Tab by mouth every day. 90 Tab 2     No current facility-administered medications for this visit.          Allergies as of 2020   • (No Known Allergies)        Social History     Socioeconomic History   • Marital status:      Spouse name: Not on file   • Number of children: Not on file   • Years of education: Not on file   • Highest education level: Not on file   Occupational History   • Not on file   Social Needs   • Financial resource strain: Not on file   • Food insecurity:     Worry: Not on file     Inability: Not on file   • Transportation needs:     Medical: Not on file     Non-medical: Not on file   Tobacco Use   • Smoking status: Former Smoker     Packs/day: 2.00     Years: 1.00     Pack years: 2.00     Types: Cigarettes     Start date: 1996     Last attempt to quit: 1997     Years since quittin.0   • Smokeless tobacco: Never Used   Substance and Sexual Activity   • Alcohol use: No   • Drug use: No   • Sexual activity: Not on file   Lifestyle   • Physical activity:     Days per week: Not on file     Minutes per session: Not on file   • Stress: Not on file   Relationships   • Social connections:     Talks on phone: Not on file     Gets together: Not on file     Attends [employer] service: Not on file     Active member of club or organization:  Not on file     Attends meetings of clubs or organizations: Not on file     Relationship status: Not on file   • Intimate partner violence:     Fear of current or ex partner: Not on file     Emotionally abused: Not on file     Physically abused: Not on file     Forced sexual activity: Not on file   Other Topics Concern   • Not on file   Social History Narrative   • Not on file       Family History   Problem Relation Age of Onset   • Alcohol/Drug Mother    • Heart Disease Mother    • No Known Problems Father        Past Surgical History:   Procedure Laterality Date   • APPENDECTOMY     • PRIMARY C SECTION         ROS:  Denies any Headache, Blurred Vision, Confusion Chest pain,  Shortness of breath,  Abdominal pain, Changes of bowel or bladder, Lower ext edema, Fevers, Nights sweats, Weight Changes, Focal weakness or numbness.  All other systems are negative.    /94 (BP Location: Left arm, Patient Position: Sitting)   Pulse 88   Temp 36.7 °C (98 °F) (Temporal)   Resp 12   Ht 1.524 m (5') Comment: per patient  Wt 74.4 kg (164 lb)   SpO2 95%   BMI 32.03 kg/m²     Physical Exam:  Gen:         Alert and oriented, No apparent distress.  HEENT:   Perrla, TM clear,  Oralpharynx no erythema or exudates.  Neck:       No Jugular venous distension, Lymphadenopathy, Thyromegaly, Bruits.  Lungs:     Clear to auscultation bilaterally  CV:          Regular rate and rhythm. No murmurs, rubs or gallops.  Abd:         Soft non tender, non distended. Normal active bowel sounds. No                                        Hepatosplenomegaly, No pulsatile masses.  Ext:          No clubbing, cyanosis, edema.      Assessment and Plan.   70 y.o. female     1. Essential hypertension  Elevated.  Probably because she has not been taking her blood pressure medication.  Patient ran out of her blood pressure medication, needs refill.  Continue hydrochlorothiazide 12.5 mg daily, and losartan 50 mg daily.  We will continue monitor blood  pressure.    - CBC WITH DIFFERENTIAL; Future  - Comp Metabolic Panel; Future  - Lipid Profile; Future  - TSH; Future  - hydrochlorothiazide (MICROZIDE) 12.5 MG capsule; Take 1 Cap by mouth every day.  Dispense: 90 Cap; Refill: 2  - losartan (COZAAR) 50 MG Tab; Take 1 Tab by mouth every day.  Dispense: 90 Tab; Refill: 2    2. Prediabetes  Last A1c was 6.5.  No history of diabetes.  Patient is counseled on lifestyle medication.  Continue monitor blood glucose.    - Comp Metabolic Panel; Future  - HEMOGLOBIN A1C; Future  - Lipid Profile; Future    3. Mixed hyperlipidemia  Patient has high total cholesterol and LDL, 10 years cardiac risk is 17.1%.  Will start patient on pravastatin 10 mg, side effects of medication discussed.  Will check lipid panel and liver function in few months.    - Lipid Profile; Future  - TSH; Future  - pravastatin (PRAVACHOL) 10 MG Tab; Take 1 Tab by mouth every day.  Dispense: 90 Tab; Refill: 2    4. Healthcare maintenance  Patient is due for mammogram, colonoscopy, flu and pneumonia shot.  Discussed bone density next visit.    5. Colon cancer screening    - REFERRAL TO GI FOR COLONOSCOPY    6. Breast cancer screening    - MA-SCREENING MAMMO BILAT W/TOMOSYNTHESIS W/CAD; Future    7. Need for vaccination    - INFLUENZA VACCINE, HIGH DOSE (65+ ONLY)  - Pneumococal Polysaccharide Vaccine 23-Valent =>1YO SQ/IM    8. Obesity (BMI 30.0-34.9)  BMI 32.03  Patient is counseled on lifestyle modification.    - Patient identified as having weight management issue.  Appropriate orders and counseling given.

## 2020-02-05 ENCOUNTER — HOSPITAL ENCOUNTER (OUTPATIENT)
Dept: RADIOLOGY | Facility: MEDICAL CENTER | Age: 70
End: 2020-02-05
Attending: FAMILY MEDICINE
Payer: MEDICARE

## 2020-02-05 DIAGNOSIS — Z12.31 OTHER SCREENING MAMMOGRAM: ICD-10-CM

## 2020-02-05 PROCEDURE — 77067 SCR MAMMO BI INCL CAD: CPT

## 2021-03-03 DIAGNOSIS — Z23 NEED FOR VACCINATION: ICD-10-CM

## 2021-03-25 ENCOUNTER — APPOINTMENT (OUTPATIENT)
Dept: RADIOLOGY | Facility: MEDICAL CENTER | Age: 71
DRG: 739 | End: 2021-03-25
Attending: EMERGENCY MEDICINE
Payer: MEDICARE

## 2021-03-25 ENCOUNTER — HOSPITAL ENCOUNTER (INPATIENT)
Facility: MEDICAL CENTER | Age: 71
LOS: 14 days | DRG: 739 | End: 2021-04-09
Attending: EMERGENCY MEDICINE | Admitting: INTERNAL MEDICINE
Payer: MEDICARE

## 2021-03-25 DIAGNOSIS — R07.9 ACUTE CHEST PAIN: ICD-10-CM

## 2021-03-25 DIAGNOSIS — D50.0 IRON DEFICIENCY ANEMIA DUE TO CHRONIC BLOOD LOSS: ICD-10-CM

## 2021-03-25 DIAGNOSIS — I63.412 CEREBROVASCULAR ACCIDENT (CVA) DUE TO EMBOLISM OF LEFT MIDDLE CEREBRAL ARTERY (HCC): ICD-10-CM

## 2021-03-25 DIAGNOSIS — D64.9 ANEMIA, UNSPECIFIED TYPE: ICD-10-CM

## 2021-03-25 DIAGNOSIS — N30.90 CYSTITIS: ICD-10-CM

## 2021-03-25 DIAGNOSIS — N85.2 UTERINE ENLARGEMENT: ICD-10-CM

## 2021-03-25 DIAGNOSIS — C54.1 ENDOMETRIAL CANCER (HCC): ICD-10-CM

## 2021-03-25 LAB
ALBUMIN SERPL BCP-MCNC: 3.5 G/DL (ref 3.2–4.9)
ALBUMIN/GLOB SERPL: 0.9 G/DL
ALP SERPL-CCNC: 119 U/L (ref 30–99)
ALT SERPL-CCNC: 8 U/L (ref 2–50)
ANION GAP SERPL CALC-SCNC: 9 MMOL/L (ref 7–16)
AST SERPL-CCNC: 21 U/L (ref 12–45)
BASOPHILS # BLD AUTO: 0.5 % (ref 0–1.8)
BASOPHILS # BLD: 0.05 K/UL (ref 0–0.12)
BILIRUB SERPL-MCNC: 0.3 MG/DL (ref 0.1–1.5)
BUN SERPL-MCNC: 13 MG/DL (ref 8–22)
CALCIUM SERPL-MCNC: 9.4 MG/DL (ref 8.5–10.5)
CHLORIDE SERPL-SCNC: 102 MMOL/L (ref 96–112)
CO2 SERPL-SCNC: 24 MMOL/L (ref 20–33)
COMMENT 1642: NORMAL
CREAT SERPL-MCNC: 0.99 MG/DL (ref 0.5–1.4)
D DIMER PPP IA.FEU-MCNC: 1.71 UG/ML (FEU) (ref 0–0.5)
EKG IMPRESSION: NORMAL
EOSINOPHIL # BLD AUTO: 0.1 K/UL (ref 0–0.51)
EOSINOPHIL NFR BLD: 1.1 % (ref 0–6.9)
ERYTHROCYTE [DISTWIDTH] IN BLOOD BY AUTOMATED COUNT: 42.3 FL (ref 35.9–50)
GLOBULIN SER CALC-MCNC: 3.9 G/DL (ref 1.9–3.5)
GLUCOSE SERPL-MCNC: 126 MG/DL (ref 65–99)
HCT VFR BLD AUTO: 24.9 % (ref 37–47)
HGB BLD-MCNC: 7.1 G/DL (ref 12–16)
HYPOCHROMIA BLD QL SMEAR: ABNORMAL
IMM GRANULOCYTES # BLD AUTO: 0.04 K/UL (ref 0–0.11)
IMM GRANULOCYTES NFR BLD AUTO: 0.4 % (ref 0–0.9)
LIPASE SERPL-CCNC: 21 U/L (ref 11–82)
LYMPHOCYTES # BLD AUTO: 1.36 K/UL (ref 1–4.8)
LYMPHOCYTES NFR BLD: 14.7 % (ref 22–41)
MCH RBC QN AUTO: 17.8 PG (ref 27–33)
MCHC RBC AUTO-ENTMCNC: 28.5 G/DL (ref 33.6–35)
MCV RBC AUTO: 62.4 FL (ref 81.4–97.8)
MONOCYTES # BLD AUTO: 0.87 K/UL (ref 0–0.85)
MONOCYTES NFR BLD AUTO: 9.4 % (ref 0–13.4)
MORPHOLOGY BLD-IMP: NORMAL
NEUTROPHILS # BLD AUTO: 6.84 K/UL (ref 2–7.15)
NEUTROPHILS NFR BLD: 73.9 % (ref 44–72)
NRBC # BLD AUTO: 0 K/UL
NRBC BLD-RTO: 0 /100 WBC
PLATELET # BLD AUTO: 370 K/UL (ref 164–446)
PLATELET BLD QL SMEAR: NORMAL
PMV BLD AUTO: 9.8 FL (ref 9–12.9)
POTASSIUM SERPL-SCNC: 4 MMOL/L (ref 3.6–5.5)
PROT SERPL-MCNC: 7.4 G/DL (ref 6–8.2)
RBC # BLD AUTO: 3.99 M/UL (ref 4.2–5.4)
RBC BLD AUTO: PRESENT
SODIUM SERPL-SCNC: 135 MMOL/L (ref 135–145)
TROPONIN T SERPL-MCNC: 7 NG/L (ref 6–19)
WBC # BLD AUTO: 9.3 K/UL (ref 4.8–10.8)

## 2021-03-25 PROCEDURE — 85025 COMPLETE CBC W/AUTO DIFF WBC: CPT

## 2021-03-25 PROCEDURE — 74175 CTA ABDOMEN W/CONTRAST: CPT

## 2021-03-25 PROCEDURE — 94760 N-INVAS EAR/PLS OXIMETRY 1: CPT

## 2021-03-25 PROCEDURE — U0003 INFECTIOUS AGENT DETECTION BY NUCLEIC ACID (DNA OR RNA); SEVERE ACUTE RESPIRATORY SYNDROME CORONAVIRUS 2 (SARS-COV-2) (CORONAVIRUS DISEASE [COVID-19]), AMPLIFIED PROBE TECHNIQUE, MAKING USE OF HIGH THROUGHPUT TECHNOLOGIES AS DESCRIBED BY CMS-2020-01-R: HCPCS

## 2021-03-25 PROCEDURE — 99285 EMERGENCY DEPT VISIT HI MDM: CPT

## 2021-03-25 PROCEDURE — 80053 COMPREHEN METABOLIC PANEL: CPT

## 2021-03-25 PROCEDURE — 86850 RBC ANTIBODY SCREEN: CPT

## 2021-03-25 PROCEDURE — U0005 INFEC AGEN DETEC AMPLI PROBE: HCPCS

## 2021-03-25 PROCEDURE — 93005 ELECTROCARDIOGRAM TRACING: CPT | Performed by: EMERGENCY MEDICINE

## 2021-03-25 PROCEDURE — 85379 FIBRIN DEGRADATION QUANT: CPT

## 2021-03-25 PROCEDURE — 84484 ASSAY OF TROPONIN QUANT: CPT

## 2021-03-25 PROCEDURE — 86900 BLOOD TYPING SEROLOGIC ABO: CPT

## 2021-03-25 PROCEDURE — 83690 ASSAY OF LIPASE: CPT

## 2021-03-25 PROCEDURE — 71045 X-RAY EXAM CHEST 1 VIEW: CPT

## 2021-03-25 PROCEDURE — 86901 BLOOD TYPING SEROLOGIC RH(D): CPT

## 2021-03-25 PROCEDURE — 36415 COLL VENOUS BLD VENIPUNCTURE: CPT

## 2021-03-25 PROCEDURE — 700117 HCHG RX CONTRAST REV CODE 255: Performed by: EMERGENCY MEDICINE

## 2021-03-25 RX ADMIN — IOHEXOL 80 ML: 350 INJECTION, SOLUTION INTRAVENOUS at 23:04

## 2021-03-25 ASSESSMENT — FIBROSIS 4 INDEX: FIB4 SCORE: 1.18

## 2021-03-26 ENCOUNTER — APPOINTMENT (OUTPATIENT)
Dept: RADIOLOGY | Facility: MEDICAL CENTER | Age: 71
DRG: 739 | End: 2021-03-26
Attending: STUDENT IN AN ORGANIZED HEALTH CARE EDUCATION/TRAINING PROGRAM
Payer: MEDICARE

## 2021-03-26 ENCOUNTER — APPOINTMENT (OUTPATIENT)
Dept: RADIOLOGY | Facility: MEDICAL CENTER | Age: 71
DRG: 739 | End: 2021-03-26
Attending: NURSE PRACTITIONER
Payer: MEDICARE

## 2021-03-26 PROBLEM — E78.5 HYPERLIPIDEMIA: Status: ACTIVE | Noted: 2018-11-26

## 2021-03-26 PROBLEM — C54.1 ENDOMETRIAL CANCER (HCC): Status: ACTIVE | Noted: 2021-03-26

## 2021-03-26 PROBLEM — D64.9 ANEMIA: Status: ACTIVE | Noted: 2021-03-26

## 2021-03-26 PROBLEM — N85.8 UTERINE MASS: Status: ACTIVE | Noted: 2021-03-26

## 2021-03-26 LAB
ABO + RH BLD: NORMAL
ABO GROUP BLD: NORMAL
BARCODED ABORH UBTYP: 6200
BARCODED ABORH UBTYP: 6200
BARCODED PRD CODE UBPRD: NORMAL
BARCODED PRD CODE UBPRD: NORMAL
BARCODED UNIT NUM UBUNT: NORMAL
BARCODED UNIT NUM UBUNT: NORMAL
BASOPHILS # BLD AUTO: 0.9 % (ref 0–1.8)
BASOPHILS # BLD: 0.07 K/UL (ref 0–0.12)
BLD GP AB SCN SERPL QL: NORMAL
COMPONENT R 8504R: NORMAL
COMPONENT R 8504R: NORMAL
EOSINOPHIL # BLD AUTO: 0.19 K/UL (ref 0–0.51)
EOSINOPHIL NFR BLD: 2.3 % (ref 0–6.9)
ERYTHROCYTE [DISTWIDTH] IN BLOOD BY AUTOMATED COUNT: 43.4 FL (ref 35.9–50)
HCT VFR BLD AUTO: 26.1 % (ref 37–47)
HGB BLD-MCNC: 7.7 G/DL (ref 12–16)
HGB BLD-MCNC: 8.1 G/DL (ref 12–16)
HGB BLD-MCNC: 8.5 G/DL (ref 12–16)
HGB BLD-MCNC: 8.7 G/DL (ref 12–16)
IMM GRANULOCYTES # BLD AUTO: 0.02 K/UL (ref 0–0.11)
IMM GRANULOCYTES NFR BLD AUTO: 0.2 % (ref 0–0.9)
LYMPHOCYTES # BLD AUTO: 1.31 K/UL (ref 1–4.8)
LYMPHOCYTES NFR BLD: 16.1 % (ref 22–41)
MAGNESIUM SERPL-MCNC: 2 MG/DL (ref 1.5–2.5)
MCH RBC QN AUTO: 18.3 PG (ref 27–33)
MCHC RBC AUTO-ENTMCNC: 29.5 G/DL (ref 33.6–35)
MCV RBC AUTO: 62.1 FL (ref 81.4–97.8)
MONOCYTES # BLD AUTO: 0.82 K/UL (ref 0–0.85)
MONOCYTES NFR BLD AUTO: 10.1 % (ref 0–13.4)
NEUTROPHILS # BLD AUTO: 5.71 K/UL (ref 2–7.15)
NEUTROPHILS NFR BLD: 70.4 % (ref 44–72)
NRBC # BLD AUTO: 0 K/UL
NRBC BLD-RTO: 0 /100 WBC
PHOSPHATE SERPL-MCNC: 3.4 MG/DL (ref 2.5–4.5)
PLATELET # BLD AUTO: 367 K/UL (ref 164–446)
PMV BLD AUTO: 9.1 FL (ref 9–12.9)
PRODUCT TYPE UPROD: NORMAL
PRODUCT TYPE UPROD: NORMAL
RBC # BLD AUTO: 4.2 M/UL (ref 4.2–5.4)
RH BLD: NORMAL
SARS-COV-2 RNA RESP QL NAA+PROBE: NOTDETECTED
SPECIMEN SOURCE: NORMAL
UNIT STATUS USTAT: NORMAL
UNIT STATUS USTAT: NORMAL
WBC # BLD AUTO: 8.1 K/UL (ref 4.8–10.8)

## 2021-03-26 PROCEDURE — 700102 HCHG RX REV CODE 250 W/ 637 OVERRIDE(OP): Performed by: NURSE PRACTITIONER

## 2021-03-26 PROCEDURE — 86923 COMPATIBILITY TEST ELECTRIC: CPT

## 2021-03-26 PROCEDURE — 84100 ASSAY OF PHOSPHORUS: CPT

## 2021-03-26 PROCEDURE — A9270 NON-COVERED ITEM OR SERVICE: HCPCS | Performed by: NURSE PRACTITIONER

## 2021-03-26 PROCEDURE — A9270 NON-COVERED ITEM OR SERVICE: HCPCS | Performed by: STUDENT IN AN ORGANIZED HEALTH CARE EDUCATION/TRAINING PROGRAM

## 2021-03-26 PROCEDURE — 770001 HCHG ROOM/CARE - MED/SURG/GYN PRIV*

## 2021-03-26 PROCEDURE — 700102 HCHG RX REV CODE 250 W/ 637 OVERRIDE(OP): Performed by: STUDENT IN AN ORGANIZED HEALTH CARE EDUCATION/TRAINING PROGRAM

## 2021-03-26 PROCEDURE — 76856 US EXAM PELVIC COMPLETE: CPT

## 2021-03-26 PROCEDURE — 85025 COMPLETE CBC W/AUTO DIFF WBC: CPT

## 2021-03-26 PROCEDURE — A9576 INJ PROHANCE MULTIPACK: HCPCS | Performed by: NURSE PRACTITIONER

## 2021-03-26 PROCEDURE — 700117 HCHG RX CONTRAST REV CODE 255: Performed by: NURSE PRACTITIONER

## 2021-03-26 PROCEDURE — 99223 1ST HOSP IP/OBS HIGH 75: CPT | Mod: AI | Performed by: STUDENT IN AN ORGANIZED HEALTH CARE EDUCATION/TRAINING PROGRAM

## 2021-03-26 PROCEDURE — P9016 RBC LEUKOCYTES REDUCED: HCPCS

## 2021-03-26 PROCEDURE — 36415 COLL VENOUS BLD VENIPUNCTURE: CPT

## 2021-03-26 PROCEDURE — 83735 ASSAY OF MAGNESIUM: CPT

## 2021-03-26 PROCEDURE — 85018 HEMOGLOBIN: CPT

## 2021-03-26 PROCEDURE — 72197 MRI PELVIS W/O & W/DYE: CPT | Mod: ME

## 2021-03-26 PROCEDURE — 30233N1 TRANSFUSION OF NONAUTOLOGOUS RED BLOOD CELLS INTO PERIPHERAL VEIN, PERCUTANEOUS APPROACH: ICD-10-PCS | Performed by: STUDENT IN AN ORGANIZED HEALTH CARE EDUCATION/TRAINING PROGRAM

## 2021-03-26 PROCEDURE — 36430 TRANSFUSION BLD/BLD COMPNT: CPT

## 2021-03-26 RX ORDER — LABETALOL HYDROCHLORIDE 5 MG/ML
10 INJECTION, SOLUTION INTRAVENOUS EVERY 4 HOURS PRN
Status: DISCONTINUED | OUTPATIENT
Start: 2021-03-26 | End: 2021-03-28

## 2021-03-26 RX ORDER — AMLODIPINE BESYLATE 5 MG/1
5 TABLET ORAL
Status: DISCONTINUED | OUTPATIENT
Start: 2021-03-26 | End: 2021-04-01

## 2021-03-26 RX ORDER — ACETAMINOPHEN 325 MG/1
650 TABLET ORAL EVERY 6 HOURS PRN
Status: DISCONTINUED | OUTPATIENT
Start: 2021-03-26 | End: 2021-03-31

## 2021-03-26 RX ORDER — ONDANSETRON 2 MG/ML
4 INJECTION INTRAMUSCULAR; INTRAVENOUS EVERY 4 HOURS PRN
Status: DISCONTINUED | OUTPATIENT
Start: 2021-03-26 | End: 2021-03-30

## 2021-03-26 RX ORDER — OXYCODONE HYDROCHLORIDE 5 MG/1
5 TABLET ORAL
Status: DISCONTINUED | OUTPATIENT
Start: 2021-03-26 | End: 2021-03-29

## 2021-03-26 RX ORDER — AMLODIPINE BESYLATE 5 MG/1
5 TABLET ORAL
Status: DISCONTINUED | OUTPATIENT
Start: 2021-03-26 | End: 2021-03-26

## 2021-03-26 RX ORDER — MORPHINE SULFATE 4 MG/ML
2-4 INJECTION, SOLUTION INTRAMUSCULAR; INTRAVENOUS
Status: DISCONTINUED | OUTPATIENT
Start: 2021-03-26 | End: 2021-03-29

## 2021-03-26 RX ORDER — AMOXICILLIN 250 MG
2 CAPSULE ORAL 2 TIMES DAILY
Status: DISCONTINUED | OUTPATIENT
Start: 2021-03-26 | End: 2021-04-06

## 2021-03-26 RX ORDER — TRAMADOL HYDROCHLORIDE 50 MG/1
50 TABLET ORAL EVERY 4 HOURS PRN
Status: DISCONTINUED | OUTPATIENT
Start: 2021-03-26 | End: 2021-03-26

## 2021-03-26 RX ORDER — BISACODYL 10 MG
10 SUPPOSITORY, RECTAL RECTAL
Status: DISCONTINUED | OUTPATIENT
Start: 2021-03-26 | End: 2021-04-06

## 2021-03-26 RX ORDER — POLYETHYLENE GLYCOL 3350 17 G/17G
1 POWDER, FOR SOLUTION ORAL
Status: DISCONTINUED | OUTPATIENT
Start: 2021-03-26 | End: 2021-04-06

## 2021-03-26 RX ORDER — ONDANSETRON 4 MG/1
4 TABLET, ORALLY DISINTEGRATING ORAL EVERY 4 HOURS PRN
Status: DISCONTINUED | OUTPATIENT
Start: 2021-03-26 | End: 2021-04-09 | Stop reason: HOSPADM

## 2021-03-26 RX ADMIN — TRAMADOL HYDROCHLORIDE 50 MG: 50 TABLET, FILM COATED ORAL at 14:29

## 2021-03-26 RX ADMIN — OXYCODONE 5 MG: 5 TABLET ORAL at 18:24

## 2021-03-26 RX ADMIN — ACETAMINOPHEN 650 MG: 325 TABLET ORAL at 04:26

## 2021-03-26 RX ADMIN — OXYCODONE 5 MG: 5 TABLET ORAL at 22:40

## 2021-03-26 RX ADMIN — AMLODIPINE BESYLATE 5 MG: 5 TABLET ORAL at 18:24

## 2021-03-26 RX ADMIN — ACETAMINOPHEN 650 MG: 325 TABLET ORAL at 13:24

## 2021-03-26 RX ADMIN — DOCUSATE SODIUM 50 MG AND SENNOSIDES 8.6 MG 2 TABLET: 8.6; 5 TABLET, FILM COATED ORAL at 18:24

## 2021-03-26 RX ADMIN — TRAMADOL HYDROCHLORIDE 50 MG: 50 TABLET, FILM COATED ORAL at 10:19

## 2021-03-26 RX ADMIN — AMLODIPINE BESYLATE 5 MG: 5 TABLET ORAL at 06:23

## 2021-03-26 RX ADMIN — GADOTERIDOL 12 ML: 279.3 INJECTION, SOLUTION INTRAVENOUS at 12:34

## 2021-03-26 ASSESSMENT — PATIENT HEALTH QUESTIONNAIRE - PHQ9
1. LITTLE INTEREST OR PLEASURE IN DOING THINGS: NOT AT ALL
2. FEELING DOWN, DEPRESSED, IRRITABLE, OR HOPELESS: NOT AT ALL
SUM OF ALL RESPONSES TO PHQ9 QUESTIONS 1 AND 2: 0

## 2021-03-26 ASSESSMENT — ENCOUNTER SYMPTOMS
LOSS OF CONSCIOUSNESS: 0
SHORTNESS OF BREATH: 1
PALPITATIONS: 0
FEVER: 0
COUGH: 0
CHILLS: 0
FALLS: 1
FOCAL WEAKNESS: 0
SPUTUM PRODUCTION: 0
WEAKNESS: 1
PHOTOPHOBIA: 0
ABDOMINAL PAIN: 1
NAUSEA: 0
NERVOUS/ANXIOUS: 1
EYE PAIN: 0
VOMITING: 0
SEIZURES: 0
MYALGIAS: 0

## 2021-03-26 ASSESSMENT — LIFESTYLE VARIABLES
HOW MANY TIMES IN THE PAST YEAR HAVE YOU HAD 5 OR MORE DRINKS IN A DAY: 0
AVERAGE NUMBER OF DAYS PER WEEK YOU HAVE A DRINK CONTAINING ALCOHOL: 0
EVER HAD A DRINK FIRST THING IN THE MORNING TO STEADY YOUR NERVES TO GET RID OF A HANGOVER: NO
SUBSTANCE_ABUSE: 0
ALCOHOL_USE: NO
EVER FELT BAD OR GUILTY ABOUT YOUR DRINKING: NO
TOTAL SCORE: 0
ON A TYPICAL DAY WHEN YOU DRINK ALCOHOL HOW MANY DRINKS DO YOU HAVE: 0
CONSUMPTION TOTAL: NEGATIVE
HAVE YOU EVER FELT YOU SHOULD CUT DOWN ON YOUR DRINKING: NO
HAVE PEOPLE ANNOYED YOU BY CRITICIZING YOUR DRINKING: NO
TOTAL SCORE: 0
DOES PATIENT WANT TO STOP DRINKING: NO
TOTAL SCORE: 0

## 2021-03-26 ASSESSMENT — PAIN DESCRIPTION - PAIN TYPE
TYPE: ACUTE PAIN
TYPE: ACUTE PAIN

## 2021-03-26 ASSESSMENT — PAIN SCALES - WONG BAKER
WONGBAKER_NUMERICALRESPONSE: HURTS JUST A LITTLE BIT
WONGBAKER_NUMERICALRESPONSE: HURTS A LITTLE MORE

## 2021-03-26 ASSESSMENT — FIBROSIS 4 INDEX: FIB4 SCORE: 1.38

## 2021-03-26 NOTE — HOSPITAL COURSE
Ms. Desai is a pleasant 70 y/o  female with a PMHx of HTN, vaginal bleeding, and CKD who presented to the ED on 3/25/2021 with complaints of chest and back pain, progressive weakness, and GONZALEZ. She has also had 1 year of progressive weight loss and vaginal bleeding. In the ED she was found to be anemic with a hgb of 7.1 and was transfused 1 unit of PRBC. Her EKG showed no ischemia or infarct, though her d-dimer was elevated so a CT scan was done and was negative for aortic aneurysm or dissection but did have partial visualization of a markedly enlarged heterogeneous uterus with hypervascularity concerning for malignancy versus fibroids. A pelvic ultrasound was then done and showed most of the same findings so an MRI of the pelvis was then performed and revealed stage IV endometrial cancer with an endometrial mass measuring 13 cm invading through the anterior uterine wall and uterine fundus, metastatic parametrial, external iliac and para-aortic lymphadenopathy, and multiple pelvic osseous metastases.

## 2021-03-26 NOTE — ED TRIAGE NOTES
".  Chief Complaint   Patient presents with   • Chest Pain     mid epigastric   • Back Pain     radiating from right scapula to lower back    Pt BIB EMS from home with above complaints. Pt reports she had chest pain in her mid epigastric area that felt like \"some was pounding on her\". Pain lasted about 5-10 minutes. Pt also c/o sharp radiating back pain that originates in her right scapula and radiates down to her lower back.  Pt given 324 ASA, Morphine 2 mg, Nitro 1 sl PTA with mild relief of pain. Pt notes pain is now located in the lower part of her stomach.   "

## 2021-03-26 NOTE — ED PROVIDER NOTES
"ED Provider Note    CHIEF COMPLAINT  Chief Complaint   Patient presents with   • Chest Pain     mid epigastric   • Back Pain     radiating from right scapula to lower back       HPI  Graciela Desai is a 71 y.o. female who presents to the emergency department with complaint of chest pain. Past medical history significant for hypertension, prior renal and liver dysfunction as well. Tonight was in the kitchen making some dinner when she had onset of right upper chest pain into her shoulder, mid back as well as low back. Incident lasted approximately an hour. Currently feeling slightly better. Earlier today before this episode she also had some mid epigastric discomfort. No shortness of breath. No diaphoresis. No nausea vomiting. No recent illness. No known trauma. Does continue smoked two packs a day of cigarettes.    REVIEW OF SYSTEMS  See HPI for further details. All other systems are negative.     PAST MEDICAL HISTORY   has a past medical history of Hypertension.    SOCIAL HISTORY  Social History     Tobacco Use   • Smoking status: Former Smoker     Packs/day: 2.00     Years: 1.00     Pack years: 2.00     Types: Cigarettes     Start date: 1996     Quit date: 1997     Years since quittin.2   • Smokeless tobacco: Never Used   Substance and Sexual Activity   • Alcohol use: No   • Drug use: No   • Sexual activity: Not on file       SURGICAL HISTORY   has a past surgical history that includes primary c section and appendectomy.    CURRENT MEDICATIONS  Home Medications     Reviewed by Jennifer Hartman R.N. (Registered Nurse) on 21 at 2147  Med List Status: Complete   Medication Last Dose Status   hydrochlorothiazide (MICROZIDE) 12.5 MG capsule  Active   losartan (COZAAR) 50 MG Tab  Active   pravastatin (PRAVACHOL) 10 MG Tab  Active                ALLERGIES  No Known Allergies    PHYSICAL EXAM  VITAL SIGNS: /63   Pulse 79   Temp 36.9 °C (98.4 °F) (Temporal)   Resp 20   Ht 1.499 m (4' 11\") "   Wt 61.2 kg (135 lb)   SpO2 96%   BMI 27.27 kg/m²  @TANNER[[id number]::@   Pulse ox interpretation: I interpret this pulse ox as normal.  Constitutional: Alert in no apparent distress.  HENT: No signs of trauma, Bilateral external ears normal, Nose normal.   Eyes: Pupils are equal and reactive  Neck: Normal range of motion, No tenderness, Supple  Cardiovascular: Regular rate and rhythm, no murmurs. 2+ pulses symmetric and throughout  Thorax & Lungs: Normal breath sounds, No respiratory distress, No wheezing, No chest tenderness.   Abdomen: Bowel sounds normal, Soft, No tenderness  Skin: Warm, Dry, No erythema, No rash.   Back: No bony tenderness  Extremities: Intact distal pulses, No edema, No tenderness  Musculoskeletal: Good range of motion in all major joints. No tenderness to palpation or major deformities noted.   Neurologic: Alert , Normal motor function, Normal sensory function, No focal deficits noted.   Psychiatric: Affect normal, Judgment normal, Mood normal.       DIAGNOSTIC STUDIES / PROCEDURES    LABS  Results for orders placed or performed during the hospital encounter of 03/25/21   CBC w/ Differential   Result Value Ref Range    WBC 9.3 4.8 - 10.8 K/uL    RBC 3.99 (L) 4.20 - 5.40 M/uL    Hemoglobin 7.1 (L) 12.0 - 16.0 g/dL    Hematocrit 24.9 (L) 37.0 - 47.0 %    MCV 62.4 (L) 81.4 - 97.8 fL    MCH 17.8 (L) 27.0 - 33.0 pg    MCHC 28.5 (L) 33.6 - 35.0 g/dL    RDW 42.3 35.9 - 50.0 fL    Platelet Count 370 164 - 446 K/uL    MPV 9.8 9.0 - 12.9 fL    Neutrophils-Polys 73.90 (H) 44.00 - 72.00 %    Lymphocytes 14.70 (L) 22.00 - 41.00 %    Monocytes 9.40 0.00 - 13.40 %    Eosinophils 1.10 0.00 - 6.90 %    Basophils 0.50 0.00 - 1.80 %    Immature Granulocytes 0.40 0.00 - 0.90 %    Nucleated RBC 0.00 /100 WBC    Neutrophils (Absolute) 6.84 2.00 - 7.15 K/uL    Lymphs (Absolute) 1.36 1.00 - 4.80 K/uL    Monos (Absolute) 0.87 (H) 0.00 - 0.85 K/uL    Eos (Absolute) 0.10 0.00 - 0.51 K/uL    Baso (Absolute) 0.05 0.00 -  0.12 K/uL    Immature Granulocytes (abs) 0.04 0.00 - 0.11 K/uL    NRBC (Absolute) 0.00 K/uL    Hypochromia 1+    Complete Metabolic Panel (CMP)   Result Value Ref Range    Sodium 135 135 - 145 mmol/L    Potassium 4.0 3.6 - 5.5 mmol/L    Chloride 102 96 - 112 mmol/L    Co2 24 20 - 33 mmol/L    Anion Gap 9.0 7.0 - 16.0    Glucose 126 (H) 65 - 99 mg/dL    Bun 13 8 - 22 mg/dL    Creatinine 0.99 0.50 - 1.40 mg/dL    Calcium 9.4 8.5 - 10.5 mg/dL    AST(SGOT) 21 12 - 45 U/L    ALT(SGPT) 8 2 - 50 U/L    Alkaline Phosphatase 119 (H) 30 - 99 U/L    Total Bilirubin 0.3 0.1 - 1.5 mg/dL    Albumin 3.5 3.2 - 4.9 g/dL    Total Protein 7.4 6.0 - 8.2 g/dL    Globulin 3.9 (H) 1.9 - 3.5 g/dL    A-G Ratio 0.9 g/dL   Troponin STAT   Result Value Ref Range    Troponin T 7 6 - 19 ng/L   Lipase   Result Value Ref Range    Lipase 21 11 - 82 U/L   D-Dimer (only helpful in low pre-test probability wells critieria. Do not order if patient ruled out by PERC criteria. See Weblinks at top of Labs section)   Result Value Ref Range    D-Dimer Screen 1.71 (H) 0.00 - 0.50 ug/mL (FEU)   PERIPHERAL SMEAR REVIEW   Result Value Ref Range    Peripheral Smear Review see below    PLATELET ESTIMATE   Result Value Ref Range    Plt Estimation Normal    MORPHOLOGY   Result Value Ref Range    RBC Morphology Present    DIFFERENTIAL COMMENT   Result Value Ref Range    Comments-Diff see below    ESTIMATED GFR   Result Value Ref Range    GFR If African American >60 >60 mL/min/1.73 m 2    GFR If Non  56 (A) >60 mL/min/1.73 m 2   EKG   Result Value Ref Range    Report       [facility] Emergency Dept.    Test Date:  2021  Pt Name:    NOAH BATISTA                 Department: ER  MRN:        [id number]                      Room:        27  Gender:     Female                       Technician: [id number]  :        1950                   Requested By:ER TRIAGE PROTOCOL  Order #:    [id number]                    Reading  MD:    Measurements  Intervals                                Axis  Rate:       96                           P:          66  WA:         142                          QRS:        -22  QRSD:       71                           T:          17  QT:         369  QTc:        467    Interpretive Statements  Sinus rhythm  Probable left atrial enlargement  Abnormal R-wave progression, early transition  Left ventricular hypertrophy  Inferior infarct, old  No previous ECG available for comparison           RADIOLOGY  CT-CTA COMPLETE THORACOABDOMINAL AORTA   Preliminary Result      No evidence of aortic aneurysm or dissection.      Partial visualization of a markedly enlarged heterogeneous uterus with hypervascularity concerning for malignancy versus fibroids.      Diverticulosis without evidence of diverticulitis.      Bilateral hydronephrosis and hydroureter likely related to enlarged uterus.      Nonobstructive left renal stone.      Bilateral pulmonary nodules measuring up to 5 mm.            DX-CHEST-PORTABLE (1 VIEW)   Final Result      No acute cardiac or pulmonary abnormalities are identified.              COURSE & MEDICAL DECISION MAKING  Pertinent Labs & Imaging studies reviewed. (See chart for details)  71-year-old female presented emergency room for chest pain. History as above. Presentation does give concerning consideration to aortic dissection given anterior and posterior sharp pain component. However the patient is currently asymptomatic on presentation and reevaluation. Laboratory valuation as noted above with initial troponin negative. D dimer was completed secondary to concern initial evaluation for dissection that she does report history of renal disease. It was elevated leading to CT aortic DENIA which did not show any acute aortic pathology. Incidentally on blood work the patient is significantly more anemic than she was a year ago with a hemoglobin just above seven at this point. Additionally and and  incidentally the patient has a heterogenous uterus concern for possible cancer which may correlate to the anemia. At this point I do believe will be prudent for ongoing inpatient chest pain rule out as well as further oncology rule out for the uterus.    FINAL IMPRESSION  1. Acute chest pain    2. Anemia, unspecified type    3. Uterine enlargement            Electronically signed by: Derek Hercules M.D., 3/25/2021 10:05 PM

## 2021-03-26 NOTE — PROGRESS NOTES
AFTER [place] ADMISSION BY DR. HANEY:     Ms. Desai is a pleasant 72 y/o  female with a PMHx of HTN, vaginal bleeding, and CKD who presented to the ED on 3/25/2021 with complaints of chest and back pain, progressive weakness, and GONZALEZ. She has also had 1 year of progressive weight loss and vaginal bleeding. In the ED she was found to be anemic with a hgb of 7.1 and was transfused 1 unit of PRBC. Her EKG showed no ischemia or infarct, though her d-dimer was elevated so a CT scan was done and was negative for aortic aneurysm or dissection but did have partial visualization of a markedly enlarged heterogeneous uterus with hypervascularity concerning for malignancy versus fibroids. A pelvic ultrasound was then done and showed most of the same findings so an MRI of the pelvis was then performed and revealed stage IV endometrial cancer with an endometrial mass measuring 13 cm invading through the anterior uterine wall and uterine fundus, metastatic parametrial, external iliac and para-aortic lymphadenopathy, and multiple pelvic osseous metastases.    Assessment and plan:  1.  Admit to oncology.  2.  Dr. Carey consulted and will see the patient.  3.  Adjusted pain control regimen.  4.  Continues to bleed vaginally.  Will trend H/H every 8 hours.  Nursing communication order placed to transfuse 1 unit PRBC for hemoglobin less than 7 or for less than 8 if symptomatic.  5.  Repeat lab work in a.m.

## 2021-03-26 NOTE — ED NOTES
Med rec complete per interview with pt at bedside. Pt denies taking any medications. Allergies reviewed. Pt denies antibiotic use in the past 14 days.

## 2021-03-26 NOTE — PROGRESS NOTES
Pt arrived to unit via transport. Alert and oriented x4. VSS on arrival. Skin assessment completed by this RN and Ashley RN. Minimal dryness and flaking to BLE. No other skin issues noted. Admission completed. Safety precautions and call bell use explained to pt.

## 2021-03-26 NOTE — ASSESSMENT & PLAN NOTE
Underwent extensive surgery on 3/29/2021 (total abdominal hysterectomy with salpingo-oophorectomy, periaortic and pelvic lymph node dissection, omentectomy, bilateral ureterolysis, peritoneal biopsies, cystoscopy repair, bilateral ureteral stent placement).  Will follow up with gyn/onc in 2 weeks as outpatient  Significant other Star able to be patient's POA

## 2021-03-26 NOTE — H&P
Hospital Medicine History & Physical Note    Date of Service  3/26/2021    Primary Care Physician  Shan Tuttle M.D.    Consultants  None    Code Status  Full Code    Chief Complaint  Chief Complaint   Patient presents with   • Chest Pain     mid epigastric   • Back Pain     radiating from right scapula to lower back       History of Presenting Illness  71 y.o. female medical history of hypertension, vaginal bleeding, history of CKD who presented 3/25/2021 with complaints of chest pain radiating to her back, progressive weakness and dyspnea on exertion found to have anemia hemoglobin of 7.1 and a large uterine mass suspicious for malignancy.    Ms. Desai notes that today while preparing dinner she had an onset of severe chest pain that radiated to her back and shoulders that lasted for about an hour, was ultimately concerning enough to have her call EMS.  Additionally she has been having complaints of vaginal bleeding for greater than 1 year, urinary urgency, intermittent abdominal pain/discomfort, unintentional weight loss.  Patient otherwise denies any headache or vision changes, cough or sputum production, dyspnea at rest, nausea vomiting, constipation or diarrhea, dysuria.    Right CT the patient's blood pressure is 141/73, heart rate 99, respiratory rate 21 oxygen saturation 95% on room air, the patient was afebrile.  Initial work-up shows anemia hemoglobin 7.1, CHEM panel shows glucose 126 alk phos 119 otherwise normal electrolytes renal function and liver function, lipase 21.  Troponin T is 7 D-dimer screen is 1.71.  EKG showed sinus rhythm abnormal R wave progression, left ventricular hypertrophy, no ST elevations or depressions noted.  Chest x-ray shows no acute cardiac or pulmonary abnormalities identified.  CTA complete thoracoabdominal aorta obtained and showed no evidence of aortic aneurysm or dissection, partial visualization of a markedly enlarged heterogeneous uterus with hypervascularity  concerning for malignancy versus fibroids, diverticulosis without diverticulitis, bilateral hydronephrosis and hydroureter likely related to enlarged uterus, nonobstructive left renal stone, bilateral pulmonary nodules measuring up to 5 mm.  Patient subsequently admitted for evaluation work-up of anemia and uterine mass.    Review of Systems  Review of Systems   Constitutional: Positive for malaise/fatigue. Negative for chills and fever.   HENT: Negative for congestion.    Eyes: Negative for photophobia and pain.   Respiratory: Positive for shortness of breath. Negative for cough and sputum production.    Cardiovascular: Positive for chest pain. Negative for palpitations.   Gastrointestinal: Positive for abdominal pain. Negative for nausea and vomiting.   Genitourinary: Positive for frequency and urgency. Negative for dysuria.   Musculoskeletal: Positive for falls. Negative for myalgias.   Neurological: Positive for weakness. Negative for focal weakness, seizures and loss of consciousness.   Psychiatric/Behavioral: Negative for substance abuse. The patient is nervous/anxious.        Past Medical History   has a past medical history of Hypertension.    Surgical History   has a past surgical history that includes primary c section and appendectomy.     Family History  family history includes Alcohol/Drug in her mother; Heart Disease in her mother; No Known Problems in her father.     Social History   reports that she quit smoking about 24 years ago. Her smoking use included cigarettes. She started smoking about 25 years ago. She has a 2.00 pack-year smoking history. She has never used smokeless tobacco. She reports that she does not drink alcohol and does not use drugs.    Allergies  No Known Allergies    Medications  Prior to Admission Medications   Prescriptions Last Dose Informant Patient Reported? Taking?   hydrochlorothiazide (MICROZIDE) 12.5 MG capsule Not Taking at Unknown time Patient No No   Sig: Take 1 Cap  by mouth every day.   Patient not taking: Reported on 3/25/2021   losartan (COZAAR) 50 MG Tab Not Taking at Unknown time Patient No No   Sig: Take 1 Tab by mouth every day.   Patient not taking: Reported on 3/25/2021   pravastatin (PRAVACHOL) 10 MG Tab Not Taking at Unknown time Patient No No   Sig: Take 1 Tab by mouth every day.   Patient not taking: Reported on 3/25/2021      Facility-Administered Medications: None       Physical Exam  Temp:  [36.2 °C (97.2 °F)-36.9 °C (98.4 °F)] 36.2 °C (97.2 °F)  Pulse:  [79-99] 99  Resp:  [16-21] 20  BP: (141-154)/(63-83) 154/74  SpO2:  [95 %-100 %] 100 %    Physical Exam  Vitals and nursing note reviewed.   Constitutional:       General: She is not in acute distress.     Appearance: Normal appearance. She is not toxic-appearing.      Comments: 71-year-old -American female appears stated age, alert and conversant, sitting up in bed able to speak full sentences without becoming breathless.   HENT:      Head: Normocephalic and atraumatic.      Nose: Nose normal. No rhinorrhea.      Mouth/Throat:      Mouth: Mucous membranes are dry.      Pharynx: No oropharyngeal exudate.   Eyes:      Extraocular Movements: Extraocular movements intact.      Conjunctiva/sclera: Conjunctivae normal.      Pupils: Pupils are equal, round, and reactive to light.   Cardiovascular:      Rate and Rhythm: Normal rate and regular rhythm.      Pulses: Normal pulses.      Heart sounds: No murmur.   Pulmonary:      Effort: Pulmonary effort is normal. No respiratory distress.      Breath sounds: No wheezing or rhonchi.   Abdominal:      General: Bowel sounds are normal. There is distension.      Palpations: Abdomen is soft. There is mass.      Tenderness: There is abdominal tenderness. There is no guarding or rebound.      Comments: Large right-sided lower abdominal mass appreciated, diffuse/generalized tenderness mild, no rebound, no guarding no peritoneal signs   Musculoskeletal:         General: No  tenderness or deformity. Normal range of motion.      Cervical back: Normal range of motion and neck supple. No rigidity.   Skin:     General: Skin is warm and dry.      Capillary Refill: Capillary refill takes less than 2 seconds.   Neurological:      General: No focal deficit present.      Mental Status: She is alert and oriented to person, place, and time. Mental status is at baseline.      Cranial Nerves: No cranial nerve deficit.      Sensory: No sensory deficit.         Laboratory:  Recent Labs     03/25/21 2150   WBC 9.3   RBC 3.99*   HEMOGLOBIN 7.1*   HEMATOCRIT 24.9*   MCV 62.4*   MCH 17.8*   MCHC 28.5*   RDW 42.3   PLATELETCT 370   MPV 9.8     Recent Labs     03/25/21 2150   SODIUM 135   POTASSIUM 4.0   CHLORIDE 102   CO2 24   GLUCOSE 126*   BUN 13   CREATININE 0.99   CALCIUM 9.4     Recent Labs     03/25/21 2150   ALTSGPT 8   ASTSGOT 21   ALKPHOSPHAT 119*   TBILIRUBIN 0.3   LIPASE 21   GLUCOSE 126*         No results for input(s): NTPROBNP in the last 72 hours.      Recent Labs     03/25/21  2150   TROPONINT 7       Imaging:  CT-CTA COMPLETE THORACOABDOMINAL AORTA   Final Result      No evidence of aortic aneurysm or dissection.      Partial visualization of a markedly enlarged heterogeneous uterus with hypervascularity concerning for malignancy versus fibroids.      Diverticulosis without evidence of diverticulitis.      Bilateral hydronephrosis and hydroureter likely related to enlarged uterus.      Nonobstructive left renal stone.      Bilateral pulmonary nodules measuring up to 5 mm.      Nodule recommendations:   Low Risk: No routine follow-up      High Risk: Optional CT at 12 months      Comments: Use most suspicious nodule as guide to management. Follow-up intervals may vary according to size and risk.      Low Risk - Minimal or absent history of smoking and of other known risk factors.      High Risk - History of smoking or of other known risk factors.      Note: These recommendations do not  "apply to lung cancer screening, patients with immunosuppression, or patients with known primary cancer.      [employer] Society 2017 Guidelines for Management of Incidentally Detected Pulmonary Nodules in Adults      DX-CHEST-PORTABLE (1 VIEW)   Final Result      No acute cardiac or pulmonary abnormalities are identified.      US-PELVIC COMPLETE (TRANSABDOMINAL/TRANSVAGINAL) (COMBO)    (Results Pending)         Assessment/Plan:  I anticipate this patient is appropriate for observation status at this time.    * Uterine mass- (present on admission)  Assessment & Plan  -Noted on CT, will obtain pelvic ultrasound to better evaluate.  -Concerning for malignancy, consult gynecology in the morning.  -Complaining of vaginal bleeding for greater than 1 year, reports she may have been told she had fibroids in the past, has very poor health literacy, poor understanding of her medical history.      Anemia- (present on admission)  Assessment & Plan  -7.1 on admission, 11.7 1-year ago. Patient has been having vaginal bleeding greater than 1 year, CT abdomen noted \"large heterogeneous hypervascular uterus. This could be due to underlying fibroids though the increased vascularity is concerning for malignancy\"  -Transfuse 1 unit PRBC for symptomatic anemia  -Gynecology consult in the morning.  -Transfuse PRBC if hemoglobin equal to or less than 7.0.    Essential hypertension- (present on admission)  Assessment & Plan  -Amlodipine 5 mg daily, monitor response and titrate as indicated      "

## 2021-03-27 ENCOUNTER — APPOINTMENT (OUTPATIENT)
Dept: RADIOLOGY | Facility: MEDICAL CENTER | Age: 71
DRG: 739 | End: 2021-03-27
Attending: NURSE PRACTITIONER
Payer: MEDICARE

## 2021-03-27 PROBLEM — R39.9 UTI SYMPTOMS: Status: ACTIVE | Noted: 2021-03-27

## 2021-03-27 PROBLEM — K59.00 CONSTIPATION: Status: ACTIVE | Noted: 2021-03-27

## 2021-03-27 PROBLEM — R60.9 PERIPHERAL EDEMA: Status: ACTIVE | Noted: 2021-03-27

## 2021-03-27 PROBLEM — N30.90 CYSTITIS: Status: ACTIVE | Noted: 2021-03-27

## 2021-03-27 LAB
ALBUMIN SERPL BCP-MCNC: 3.3 G/DL (ref 3.2–4.9)
ALBUMIN/GLOB SERPL: 0.9 G/DL
ALP SERPL-CCNC: 110 U/L (ref 30–99)
ALT SERPL-CCNC: 6 U/L (ref 2–50)
ANION GAP SERPL CALC-SCNC: 11 MMOL/L (ref 7–16)
APPEARANCE UR: ABNORMAL
APTT PPP: 30.1 SEC (ref 24.7–36)
AST SERPL-CCNC: 15 U/L (ref 12–45)
BACTERIA #/AREA URNS HPF: NEGATIVE /HPF
BASOPHILS # BLD AUTO: 0.4 % (ref 0–1.8)
BASOPHILS # BLD: 0.04 K/UL (ref 0–0.12)
BILIRUB SERPL-MCNC: 0.2 MG/DL (ref 0.1–1.5)
BILIRUB UR QL STRIP.AUTO: NEGATIVE
BUN SERPL-MCNC: 13 MG/DL (ref 8–22)
CALCIUM SERPL-MCNC: 8.8 MG/DL (ref 8.5–10.5)
CANCER AG125 SERPL-ACNC: 54 U/ML (ref 0–35)
CHLORIDE SERPL-SCNC: 102 MMOL/L (ref 96–112)
CO2 SERPL-SCNC: 25 MMOL/L (ref 20–33)
COLOR UR: YELLOW
CREAT SERPL-MCNC: 1.02 MG/DL (ref 0.5–1.4)
EOSINOPHIL # BLD AUTO: 0.31 K/UL (ref 0–0.51)
EOSINOPHIL NFR BLD: 3 % (ref 0–6.9)
EPI CELLS #/AREA URNS HPF: NEGATIVE /HPF
ERYTHROCYTE [DISTWIDTH] IN BLOOD BY AUTOMATED COUNT: 50.3 FL (ref 35.9–50)
FERRITIN SERPL-MCNC: 13.2 NG/ML (ref 10–291)
FOLATE SERPL-MCNC: 11.5 NG/ML
GLOBULIN SER CALC-MCNC: 3.8 G/DL (ref 1.9–3.5)
GLUCOSE SERPL-MCNC: 111 MG/DL (ref 65–99)
GLUCOSE UR STRIP.AUTO-MCNC: NEGATIVE MG/DL
HCT VFR BLD AUTO: 28.5 % (ref 37–47)
HGB BLD-MCNC: 8.3 G/DL (ref 12–16)
HGB BLD-MCNC: 9 G/DL (ref 12–16)
HGB RETIC QN AUTO: 21.7 PG/CELL (ref 29–35)
HYALINE CASTS #/AREA URNS LPF: ABNORMAL /LPF
IMM GRANULOCYTES # BLD AUTO: 0.05 K/UL (ref 0–0.11)
IMM GRANULOCYTES NFR BLD AUTO: 0.5 % (ref 0–0.9)
IMM RETICS NFR: 32.2 % (ref 9.3–17.4)
INR PPP: 1.07 (ref 0.87–1.13)
IRON SATN MFR SERPL: 8 % (ref 15–55)
IRON SERPL-MCNC: 25 UG/DL (ref 40–170)
KETONES UR STRIP.AUTO-MCNC: NEGATIVE MG/DL
LEUKOCYTE ESTERASE UR QL STRIP.AUTO: ABNORMAL
LYMPHOCYTES # BLD AUTO: 1.68 K/UL (ref 1–4.8)
LYMPHOCYTES NFR BLD: 16.4 % (ref 22–41)
MCH RBC QN AUTO: 19.1 PG (ref 27–33)
MCHC RBC AUTO-ENTMCNC: 29.1 G/DL (ref 33.6–35)
MCV RBC AUTO: 65.5 FL (ref 81.4–97.8)
MICRO URNS: ABNORMAL
MONOCYTES # BLD AUTO: 0.98 K/UL (ref 0–0.85)
MONOCYTES NFR BLD AUTO: 9.5 % (ref 0–13.4)
NEUTROPHILS # BLD AUTO: 7.21 K/UL (ref 2–7.15)
NEUTROPHILS NFR BLD: 70.2 % (ref 44–72)
NITRITE UR QL STRIP.AUTO: NEGATIVE
NRBC # BLD AUTO: 0 K/UL
NRBC BLD-RTO: 0 /100 WBC
PH UR STRIP.AUTO: 5.5 [PH] (ref 5–8)
PLATELET # BLD AUTO: 332 K/UL (ref 164–446)
PMV BLD AUTO: 9.7 FL (ref 9–12.9)
POTASSIUM SERPL-SCNC: 3.9 MMOL/L (ref 3.6–5.5)
PROT SERPL-MCNC: 7.1 G/DL (ref 6–8.2)
PROT UR QL STRIP: 100 MG/DL
PROTHROMBIN TIME: 14.2 SEC (ref 12–14.6)
RBC # BLD AUTO: 4.35 M/UL (ref 4.2–5.4)
RBC # URNS HPF: >150 /HPF
RBC UR QL AUTO: ABNORMAL
RETICS # AUTO: 0.03 M/UL (ref 0.04–0.06)
RETICS/RBC NFR: 0.7 % (ref 0.8–2.1)
SODIUM SERPL-SCNC: 138 MMOL/L (ref 135–145)
SP GR UR STRIP.AUTO: 1.01
TIBC SERPL-MCNC: 316 UG/DL (ref 250–450)
TSH SERPL DL<=0.005 MIU/L-ACNC: 5.28 UIU/ML (ref 0.38–5.33)
UIBC SERPL-MCNC: 291 UG/DL (ref 110–370)
UROBILINOGEN UR STRIP.AUTO-MCNC: 0.2 MG/DL
VIT B12 SERPL-MCNC: 470 PG/ML (ref 211–911)
WBC # BLD AUTO: 10.3 K/UL (ref 4.8–10.8)
WBC #/AREA URNS HPF: ABNORMAL /HPF

## 2021-03-27 PROCEDURE — 84443 ASSAY THYROID STIM HORMONE: CPT

## 2021-03-27 PROCEDURE — 82728 ASSAY OF FERRITIN: CPT

## 2021-03-27 PROCEDURE — 81001 URINALYSIS AUTO W/SCOPE: CPT

## 2021-03-27 PROCEDURE — 85025 COMPLETE CBC W/AUTO DIFF WBC: CPT

## 2021-03-27 PROCEDURE — 96366 THER/PROPH/DIAG IV INF ADDON: CPT

## 2021-03-27 PROCEDURE — 82607 VITAMIN B-12: CPT

## 2021-03-27 PROCEDURE — 85610 PROTHROMBIN TIME: CPT

## 2021-03-27 PROCEDURE — 700111 HCHG RX REV CODE 636 W/ 250 OVERRIDE (IP): Performed by: NURSE PRACTITIONER

## 2021-03-27 PROCEDURE — 87086 URINE CULTURE/COLONY COUNT: CPT

## 2021-03-27 PROCEDURE — 85018 HEMOGLOBIN: CPT

## 2021-03-27 PROCEDURE — 96376 TX/PRO/DX INJ SAME DRUG ADON: CPT

## 2021-03-27 PROCEDURE — 83540 ASSAY OF IRON: CPT

## 2021-03-27 PROCEDURE — 85046 RETICYTE/HGB CONCENTRATE: CPT

## 2021-03-27 PROCEDURE — A9270 NON-COVERED ITEM OR SERVICE: HCPCS | Performed by: NURSE PRACTITIONER

## 2021-03-27 PROCEDURE — 82746 ASSAY OF FOLIC ACID SERUM: CPT

## 2021-03-27 PROCEDURE — 85730 THROMBOPLASTIN TIME PARTIAL: CPT

## 2021-03-27 PROCEDURE — 80053 COMPREHEN METABOLIC PANEL: CPT

## 2021-03-27 PROCEDURE — A9270 NON-COVERED ITEM OR SERVICE: HCPCS | Performed by: STUDENT IN AN ORGANIZED HEALTH CARE EDUCATION/TRAINING PROGRAM

## 2021-03-27 PROCEDURE — 83550 IRON BINDING TEST: CPT

## 2021-03-27 PROCEDURE — 99233 SBSQ HOSP IP/OBS HIGH 50: CPT | Performed by: INTERNAL MEDICINE

## 2021-03-27 PROCEDURE — 700102 HCHG RX REV CODE 250 W/ 637 OVERRIDE(OP): Performed by: NURSE PRACTITIONER

## 2021-03-27 PROCEDURE — 700105 HCHG RX REV CODE 258: Performed by: NURSE PRACTITIONER

## 2021-03-27 PROCEDURE — 86304 IMMUNOASSAY TUMOR CA 125: CPT

## 2021-03-27 PROCEDURE — 96375 TX/PRO/DX INJ NEW DRUG ADDON: CPT

## 2021-03-27 PROCEDURE — 770004 HCHG ROOM/CARE - ONCOLOGY PRIVATE *

## 2021-03-27 PROCEDURE — 96365 THER/PROPH/DIAG IV INF INIT: CPT

## 2021-03-27 PROCEDURE — 700102 HCHG RX REV CODE 250 W/ 637 OVERRIDE(OP): Performed by: STUDENT IN AN ORGANIZED HEALTH CARE EDUCATION/TRAINING PROGRAM

## 2021-03-27 PROCEDURE — 93970 EXTREMITY STUDY: CPT | Mod: 26 | Performed by: INTERNAL MEDICINE

## 2021-03-27 PROCEDURE — 93970 EXTREMITY STUDY: CPT

## 2021-03-27 RX ORDER — DIPHENHYDRAMINE HCL 25 MG
25 TABLET ORAL ONCE
Status: COMPLETED | OUTPATIENT
Start: 2021-03-27 | End: 2021-03-27

## 2021-03-27 RX ORDER — DIPHENHYDRAMINE HYDROCHLORIDE 50 MG/ML
25 INJECTION INTRAMUSCULAR; INTRAVENOUS ONCE
Status: COMPLETED | OUTPATIENT
Start: 2021-03-27 | End: 2021-03-27

## 2021-03-27 RX ORDER — ACETAMINOPHEN 325 MG/1
650 TABLET ORAL ONCE
Status: COMPLETED | OUTPATIENT
Start: 2021-03-27 | End: 2021-03-27

## 2021-03-27 RX ORDER — PRAVASTATIN SODIUM 20 MG
10 TABLET ORAL DAILY
Status: DISCONTINUED | OUTPATIENT
Start: 2021-03-27 | End: 2021-03-30

## 2021-03-27 RX ADMIN — ACETAMINOPHEN 650 MG: 325 TABLET ORAL at 09:41

## 2021-03-27 RX ADMIN — OXYCODONE 5 MG: 5 TABLET ORAL at 16:46

## 2021-03-27 RX ADMIN — AMLODIPINE BESYLATE 5 MG: 5 TABLET ORAL at 17:35

## 2021-03-27 RX ADMIN — MORPHINE SULFATE 2 MG: 4 INJECTION INTRAVENOUS at 00:24

## 2021-03-27 RX ADMIN — DOCUSATE SODIUM 50 MG AND SENNOSIDES 8.6 MG 2 TABLET: 8.6; 5 TABLET, FILM COATED ORAL at 06:00

## 2021-03-27 RX ADMIN — DIPHENHYDRAMINE HYDROCHLORIDE 25 MG: 25 TABLET ORAL at 09:41

## 2021-03-27 RX ADMIN — MORPHINE SULFATE 4 MG: 4 INJECTION INTRAVENOUS at 09:29

## 2021-03-27 RX ADMIN — SODIUM CHLORIDE 1225 MG: 9 INJECTION, SOLUTION INTRAVENOUS at 11:52

## 2021-03-27 RX ADMIN — PRAVASTATIN SODIUM 10 MG: 20 TABLET ORAL at 11:57

## 2021-03-27 RX ADMIN — DOCUSATE SODIUM 50 MG AND SENNOSIDES 8.6 MG 2 TABLET: 8.6; 5 TABLET, FILM COATED ORAL at 17:35

## 2021-03-27 RX ADMIN — MORPHINE SULFATE 2 MG: 4 INJECTION INTRAVENOUS at 15:22

## 2021-03-27 RX ADMIN — SODIUM CHLORIDE 25 MG: 9 INJECTION, SOLUTION INTRAVENOUS at 10:01

## 2021-03-27 RX ADMIN — CEFTRIAXONE SODIUM 1 G: 1 INJECTION, POWDER, FOR SOLUTION INTRAMUSCULAR; INTRAVENOUS at 17:32

## 2021-03-27 ASSESSMENT — ENCOUNTER SYMPTOMS
VOMITING: 0
DIARRHEA: 0
SHORTNESS OF BREATH: 0
FOCAL WEAKNESS: 0
DIZZINESS: 0
CONSTIPATION: 1
COUGH: 0
NERVOUS/ANXIOUS: 1
HEADACHES: 1
SPEECH CHANGE: 0
ABDOMINAL PAIN: 1
DEPRESSION: 1
CHILLS: 0
INSOMNIA: 0
NAUSEA: 0
WEAKNESS: 1
BRUISES/BLEEDS EASILY: 1
WEIGHT LOSS: 1
SENSORY CHANGE: 0
FEVER: 0
WHEEZING: 0

## 2021-03-27 ASSESSMENT — PAIN DESCRIPTION - PAIN TYPE
TYPE: ACUTE PAIN

## 2021-03-27 NOTE — ASSESSMENT & PLAN NOTE
Unable to clinically differentiate infective urethritis/cystitis from extension of endometrial cancer  Symptomatic including dysuria, urgency, and frequency  UA +pyuria, negative nitrites  UCx negative  Empiric ceftriaxone x5 days in interim due to planned gynecologic procedure 3/29

## 2021-03-27 NOTE — CONSULTS
Gynecologic Oncology Consultation    Date: 3/26/2021    Requesting Physician: Dr. Aleksandr Mcdonald    Consulting Physician: Shonda Quinteros M.D.     Reason for consultation: Endometrial mass    CC: Vaginal bleeding     HPI: This is a 71 y.o. female who presented to the [facility] emergency department on 3/25/2021 with complaints of chest pain radiating to the back, weakness, and dyspnea on exertion.  She was found to be anemic with hemoglobin of 7.1, and upon further questioning is noted to have a longstanding history of abnormal uterine bleeding.  In addition to the above-noted symptoms, patient reported right-sided pelvic pain, unintentional weight loss, and urinary urgency/incontinence.  She was transfused 1 unit of packed red blood cells on admission with improvement of her hemoglobin to 8.7.  An EKG was performed which was negative for acute infarct or ischemia, normal troponin.  Additional review of her laboratory work is unremarkable aside from mild elevation of her alkaline phosphatase to 119.  The patient subsequently underwent a CTA chest and abdomen on 3/25/2021.  The study was negative for aortic aneurysm, and revealed no for chest lymphadenopathy, pleural effusion, or pulmonary consolidation.  Several small pulmonary nodules up to 5 mm were noted in the left lower lobe and right upper lobe subpleural spaces and along the right major fissure.  The upper abdomen was normal in appearance aside from mild bilateral hydronephrosis with a nonobstructing right-sided renal calculus.  No lymphadenopathy or free fluid appreciated, and partial visualization of a large heterogenous hypervascular uterus.  Secondary to these findings a pelvic ultrasound was performed on 3/26/2021 which revealed enlarged uterus of at least 8 x 11.6 x 11.2 cm with heterogenous appearance, EEC not well visualized, left ovary 3.4 cm, right ovary 1.7 cm, no free fluid.  A follow-up MRI pelvis performed on 3/26/2021 revealed uterus 14 x 8.8 cm  "with a large mass measuring 13.7 0.3 cm, heterogenous appearing invading the full thickness of the myometrium with concern for involvement of the omentum and extending into the endocervical canal.  Several uterine fibroids were present up to 4.2 cm in the left anterior lateral uterus.  A 4.2 cm fibroid was noted over the anterior cervix, with atrophic appearing bilateral ovaries.  Bilateral pelvic lymphadenopathy was noted with external iliac lymphadenopathy up to 1.4 cm on either side, and left periaortic lymphadenopathy up to 1.8 cm with prominent aortocaval lymph node visualized near the bifurcation.  Parametrial lymph nodes up to 1.7 cm were noted in addition to hyperenhancing lesions of the left ilium up to 5.4 x 2 cm at the SI joint, posterior left acetabular lesion measuring 2.3 x 1.5 cm, as well as a hyperenhancing lesion of 1.9 cm in L5.  Secondary to the concern for ovarian malignancy, our service was consulted.    Patient is evaluated at the bedside.  She endorses the above-noted history, however is overall a poor historian with limited recall and understanding of her health history.  She states that she has been having abnormal uterine bleeding since the birth of her second child in 1978.   She reports that she has had consistent irregular bleeding, at times heavy with passage of clots and other times with light spotting.  She reports having been previously evaluated for this approximately 5 to 6 years ago.  She reports having been prescribed a \"diaphragm\" for her abnormal bleeding, however notes that this was removed secondary to significant pain.  She denies any history of hormone replacement therapy or ever having gone 1 year or more without bleeding.  She has otherwise not been evaluated for her bleeding nor tried any other treatment.  She reports urinary urgency and incontinence as well as occasional intermittent diarrhea and constipation.  She denies any hematuria, melena, or hematochezia.  She has " been able to tolerate p.o., with isolated episode of nausea and vomiting several days ago.  The patient also voices concerned that her symptoms are related to a parasite secondary to diffuse sensation of something moving under her skin throughout her body.  In review of her records, patient apparently voiced similar complaint to her PCP in 2018.  She denies any other concerning symptoms.  She states that her chest discomfort is still present intermittently, however her weakness and shortness of breath have improved since undergoing blood transfusion.    Previous records are reviewed from her chart summary revealing that she has been followed for abnormal uterine bleeding since at least 2017.  She was previously treated at OB[facility].  Notation is made that she underwent an endometrial biopsy revealing complex hyperplasia without atypia in 2017.  A Mirena IUD was placed, however the patient subsequently had this removed secondary to severe pain and ongoing bleeding.  She was started on Megace, however it is unclear for how long she took this medication.  She then relocated to [place] and saw Dr. Espitia in October 2018 for the same.  Patient underwent a transvaginal ultrasound in November 2018 which revealed enlarged uterus measuring 5.5 x 10.7 x 7.3 cm, EEC 0.92 cm, multiple fibroids, and hypervascular cervical mass.  A recommendation was made for repeat endometrial biopsy by Dr. Espitia with possible surgery.  Patient voiced strong desire to avoid any surgical management, and did not follow-up.       Review of Systems:  Constitutional: Negative for fever, chills, + weight loss, + malaise/fatigue and no diaphoresis.   HENT: Negative for hearing loss, ear pain, nosebleeds, congestion, sore throat, neck pain, tinnitus and ear discharge.    Eyes: Negative for blurred vision, double vision, photophobia, pain, discharge and redness.   Respiratory: Negative for cough, hemoptysis, sputum production, + shortness  of breath, wheezing and stridor.    Cardiovascular: + chest pain, no palpitations, orthopnea, claudication, leg swelling and PND.   Gastrointestinal: Negative for heartburn, + nausea, + vomiting, + abdominal pain, + diarrhea, + constipation, no blood in stool and melena.   Genitourinary: Negative for dysuria, + urgency, + frequency, no hematuria and flank pain.   Musculoskeletal: Negative for myalgias, back pain, joint pain and falls.   Skin: Negative for itching and rash.  Neurological: Negative for dizziness, tingling, tremors, sensory change, speech change, focal weakness, seizures, loss of consciousness, + weakness and no headaches.   Psychiatric/Behavioral: Negative for depression, suicidal ideas, hallucinations, memory loss and substance abuse.         Past Medical History:   Diagnosis Date   • Hypertension    HLD, CKD, preDM    Past Surgical History:   Procedure Laterality Date   • APPENDECTOMY     • PRIMARY C SECTION     Open appendectomy, C/S x 3    OB/GYN History: , / x  3  LMP N/A, 10/R/x2, AUB as per above  Last pap 2018 Dr. Omkar HOLBROOK/HPV neg, + history of abnormal, reports cervical biopsies w/ unknown results in past  + Sexually active  History of fibroids    Current Facility-Administered Medications   Medication Dose Route Frequency Provider Last Rate Last Admin   • senna-docusate (PERICOLACE or SENOKOT S) 8.6-50 MG per tablet 2 tablet  2 tablet Oral BID Jewel Sawyer M.D.   2 tablet at 21 1824    And   • polyethylene glycol/lytes (MIRALAX) PACKET 1 Packet  1 Packet Oral QDAY PRN Jewel Sawyer M.D.        And   • magnesium hydroxide (MILK OF MAGNESIA) suspension 30 mL  30 mL Oral QDAY PRN Jewel Sawyer M.D.        And   • bisacodyl (DULCOLAX) suppository 10 mg  10 mg Rectal QDAY PRN Jewel Sawyer M.D.       • acetaminophen (Tylenol) tablet 650 mg  650 mg Oral Q6HRS PRN Jewel Sawyer M.D.   650 mg at 21 1324   • labetalol (NORMODYNE/TRANDATE) injection  10 mg  10 mg Intravenous Q4HRS PRN Jewel Sawyer M.D.       • ondansetron (ZOFRAN) syringe/vial injection 4 mg  4 mg Intravenous Q4HRS PRN Jewel Sawyer M.D.       • ondansetron (ZOFRAN ODT) dispertab 4 mg  4 mg Oral Q4HRS PRN Jewel Sawyer M.D.       • amLODIPine (NORVASC) tablet 5 mg  5 mg Oral Q DAY Jewel Sawyer M.D.   5 mg at 21   • oxyCODONE immediate-release (ROXICODONE) tablet 5 mg  5 mg Oral Q3HRS PRN Yara LOWE Heglar, A.P.R.N.   5 mg at 21   • morphine (pf) 4 mg/mL injection 2-4 mg  2-4 mg Intravenous Q3HRS PRN Yara A Heglar, A.P.R.N.           Allergies:  Patient has no known allergies.    Social History     Socioeconomic History   • Marital status:      Spouse name: Not on file   • Number of children: Not on file   • Years of education: Not on file   • Highest education level: Not on file   Occupational History   • Not on file   Tobacco Use   • Smoking status: Former Smoker     Packs/day: 2.00     Years: 1.00     Pack years: 2.00     Types: Cigarettes     Start date: 1996     Quit date: 1997     Years since quittin.2   • Smokeless tobacco: Never Used   Substance and Sexual Activity   • Alcohol use: No   • Drug use: No   • Sexual activity: Not on file   Other Topics Concern   • Not on file   Social History Narrative   • Not on file     Social Determinants of Health     Financial Resource Strain:    • Difficulty of Paying Living Expenses:    Food Insecurity:    • Worried About Running Out of Food in the Last Year:    • Ran Out of Food in the Last Year:    Transportation Needs:    • Lack of Transportation (Medical):    • Lack of Transportation (Non-Medical):    Physical Activity:    • Days of Exercise per Week:    • Minutes of Exercise per Session:    Stress:    • Feeling of Stress :    Social Connections:    • Frequency of Communication with Friends and Family:    • Frequency of Social Gatherings with Friends and Family:    • Attends Muslim  Services:    • Active Member of Clubs or Organizations:    • Attends Club or Organization Meetings:    • Marital Status:    Intimate Partner Violence:    • Fear of Current or Ex-Partner:    • Emotionally Abused:    • Physically Abused:    • Sexually Abused:        Family History   Problem Relation Age of Onset   • Alcohol/Drug Mother    • Heart Disease Mother    • No Known Problems Father          Physical Exam:  BP (!) 166/79   Pulse 89   Temp 36.2 °C (97.2 °F) (Temporal)   Resp 16   Ht 1.524 m (5')   Wt 62.5 kg (137 lb 12.6 oz)   SpO2 98%     Constitutional: she is oriented to person, place, and time.  she appears well-developed and thin.  No distress.  Health literacy and understanding of her condition are limited.  Head: Normocephalic and atraumatic.  Poor dentition.  Neck: Neck supple. No supraclavicular lymphadenopathy.  Cardiovascular: Normal rate, regular rhythm, intact distal pulses.   Pulmonary/Chest: Effort normal and breath sounds normal. No respiratory distress.   Abdominal: Soft.  Midline pelvic mass present extending to 4 cm above the umbilicus.  Moderately tender to palpation in the bilateral lower quadrants. There is no rebound and no guarding.   Musculoskeletal: Normal range of motion. she exhibits no edema and no tenderness.   Neurological: she is alert and oriented to person, place, and time.  No focal deficits.  Skin: Skin is warm and dry. No rash noted. No erythema.   Psychiatric: she has a normal mood and affect.  Behavior is normal.     Pelvic: Normal external female genitalia, no lesions, normal BUS. Speculum exam deferred given bedside exam.  Bimanual exam reveals smooth vaginal mucosa, small amount of blood in the vaginal vault, no palpable lesions. Cervix smooth but firm approximating 3 x 3 cm, retracted anteriorly. Uterus enlarged to at least 20-week size, midline, moderately mobile, tender over the fundus.  Unable to assess bilateral adnexa secondary to uterine bulk.  Rectovaginal  exam reveals smooth rectovaginal septum, no cul de sac nodularity, bilateral parametria/pelvic sidewalls free.        Labs:  Recent Labs     03/25/21 2150 03/25/21 2150 03/26/21  0220 03/26/21  0839 03/26/21  1549   WBC 9.3  --  8.1  --   --    RBC 3.99*  --  4.20  --   --    HEMOGLOBIN 7.1*   < > 7.7* 8.1* 8.7*   HEMATOCRIT 24.9*  --  26.1*  --   --    MCV 62.4*  --  62.1*  --   --    MCH 17.8*  --  18.3*  --   --    MCHC 28.5*  --  29.5*  --   --    RDW 42.3  --  43.4  --   --    PLATELETCT 370  --  367  --   --    MPV 9.8  --  9.1  --   --     < > = values in this interval not displayed.     Recent Labs     03/25/21 2150   SODIUM 135   POTASSIUM 4.0   CHLORIDE 102   CO2 24   GLUCOSE 126*   BUN 13   CREATININE 0.99   CALCIUM 9.4         Recent Labs     03/25/21 2150   ASTSGOT 21   ALTSGPT 8   TBILIRUBIN 0.3   ALKPHOSPHAT 119*   GLOBULIN 3.9*       Radiology:  As per HPI    Assessment: This is a 71 y.o. female admitted with chest pain and shortness of breath, found to have anemia secondary to abnormal uterine bleeding and incidentally noted uterine mass:    Plan:   1.  Endometrial mass: In review of patient's records, she has a history of complex hyperplasia and insufficient treatment due to lack of follow-up.  Discussed imaging and exam findings in detail with the patient today, both of which indicate an enlarged uterus containing a heterogenous mass, with concern for metastatic disease.  Differential diagnosis would include uterine carcinoma such as endometrioid, high-grade serous, clear cell, carcinosarcoma, or leiomyosarcoma.  We discussed that secondary to her ongoing bleeding and pain, upfront surgical management is recommended for palliation of her symptoms.  I have recommended that she undergo exploratory laparotomy with total abdominal hysterectomy and bilateral salpingo-oophorectomy, possible pelvic and/or periaortic lymphadenectomy, tumor debulking, omentectomy, and large and/or small bowel resection  depending upon intraoperative findings and frozen section.  We discussed the small risk of ostomy if a bowel resection is indicated.  I relayed that in the setting of metastatic disease, surgery alone is not curative.  As such, she will likely require chemotherapy in the adjuvant setting pending final pathology results.  We discussed the procedure in detail along with postoperative recovery course and anticipated hospital stay. Patient was counseled regarding all risks, benefits and alternatives including but not limited to infection, bleeding up to and requiring a transfusion, damage to surrounding organs including bowel, bladder, and ureter, pain, anastomotic leak, fistula, recurrence, scarring, thromboembolism, and risks of anesthesia.  The above-noted information was reviewed with both the patient and her boyfriend Star per her request.  At the conclusion of our discussion, the patient's primary concern is that her sexual function will be compromised by surgery.  She was reassured that she will be able to have intercourse after healed.  At this time, she appears overwhelmed by her diagnosis but is willing to undergo surgery for symptomatic relief and appears to have comprehension of the indication.  As such, we discussed a plan for this procedure on Monday, 3/29/2021 at 2 PM.  -Shee will need to undergo bowel prep on Miguel 3/28 with clear liquid diet  -N.p.o. after midnight 3/28  -Type and cross x2 units OCTOR  -Check coags in a.m.  -Covid 3/25/2021  -CA-125 level for baseline  2.  Abnormal uterine bleeding: Secondary to above, surgical management planned  3.  Anemia: Secondary to acute on chronic blood loss, continue to monitor hemoglobin and transfuse as indicated, goal hemoglobin 9 prior to surgery      Thank you for for allowing us to participate in this patient's care. We will continue to follow. Please feel free to contact us for any questions or if we can be of any further assistance.      Shonda BRICE  MD [place]  Gynecologic Oncology  [facility]

## 2021-03-27 NOTE — PROGRESS NOTES
IV Iron Per Pharmacy Note    Patient Lean Body Weight:  45 kg  Reason for Iron Replacement: Iron Deficiency Anemia      Lab Results   Component Value Date/Time    WBC 10.3 03/27/2021 03:30 AM    RBC 4.35 03/27/2021 03:30 AM    HEMOGLOBIN 8.3 (L) 03/27/2021 03:30 AM    HEMATOCRIT 28.5 (L) 03/27/2021 03:30 AM    MCV 65.5 (L) 03/27/2021 03:30 AM    MCH 19.1 (L) 03/27/2021 03:30 AM    MCHC 29.1 (L) 03/27/2021 03:30 AM    MPV 9.7 03/27/2021 03:30 AM       Recent Labs     03/27/21  0330   FERRITIN 13.2   FOLATE 11.5   IRON 25*         Recent Labs     03/27/21  0330   CREATININE 1.02          Assessment/Plan:  1. IV Iron Indicated.   2. Following diphenhydramine/acetaminophen premeds, administer Iron Dextran 25 mg IV test dose over 30 minutes.  3. If no reaction (Anaphylaxis, Hypotension/Hypertension, N/V/D, Chest pain/Back Pain, Urticaria/Pruritis) in the next hour, proceed to full dose.   4. Full dose: Iron Dextran 1225 mg IV over 4 hours. Continue to monitor for delayed ADR including: Arthralgia/myalgia, Headache/backache, chills/dizziness/malaise, moderate to high fever and n/v.      Ciara Cerda, PharmD, BCCCP

## 2021-03-27 NOTE — CARE PLAN
Problem: Communication  Goal: The ability to communicate needs accurately and effectively will improve  Outcome: PROGRESSING AS EXPECTED  Note: Pt updated to visitor policy.      Problem: Pain Management  Goal: Pain level will decrease to patient's comfort goal  Outcome: PROGRESSING AS EXPECTED  Note: Morphine 2 mg given for pain.

## 2021-03-27 NOTE — PROGRESS NOTES
Assessment completed. Pt A&Ox 4. Respirations are even and unlabored on room air. Pt denies/reports pain at this time. Monitors applied, VS stable, call light and belongings within reach. POC updated (D/C). Pt educated on room and call light, pt verbalized understanding. Communication board updated. Needs met.

## 2021-03-27 NOTE — PROGRESS NOTES
Hospital Medicine Daily Progress Note    Date of Service  3/27/2021    Chief Complaint  Chest & back pain, progressive weakness, and GONZALEZ    Hospital Course  Ms. Desai is a pleasant 72 y/o  female with a PMHx of HTN, vaginal bleeding, and CKD who presented to the ED on 3/25/2021 with complaints of chest and back pain, progressive weakness, and GONZALEZ. She has also had 1 year of progressive weight loss and vaginal bleeding. In the ED she was found to be anemic with a hgb of 7.1 and was transfused 1 unit of PRBC. Her EKG showed no ischemia or infarct, though her d-dimer was elevated so a CT scan was done and was negative for aortic aneurysm or dissection but did have partial visualization of a markedly enlarged heterogeneous uterus with hypervascularity concerning for malignancy versus fibroids. A pelvic ultrasound was then done and showed most of the same findings so an MRI of the pelvis was then performed and revealed stage IV endometrial cancer with an endometrial mass measuring 13 cm invading through the anterior uterine wall and uterine fundus, metastatic parametrial, external iliac and para-aortic lymphadenopathy, and multiple pelvic osseous metastases.    Interval Problem Update  -Complains of HA, continued vaginal bleeding, and urinary frequency/urgency/dysuria. Obtaining UA w/ culture.  -Pain is better controlled today after adjusting analgesic meds yesterday.   -Constipated. D/w bedside RN who will further progress bowel protocol.   -BLE 1+ pitting edema with pain even in the calf area. Obtaining DVT study.   -Surgery planned for Monday. Bowel prep tomorrow with [facility]. NPO at [facility] on 3/28.   -Iron deficient on anemia w/u, giving IV iron today.     Consultants/Specialty  Dr. Carey    Code Status  Full Code    Disposition  Transfer to oncology vs [facility].     Review of Systems  Review of Systems   Constitutional: Positive for weight loss (unintentional). Negative for chills, fever and malaise/fatigue.    Respiratory: Negative for cough, shortness of breath and wheezing.    Cardiovascular: Positive for leg swelling. Negative for chest pain.   Gastrointestinal: Positive for abdominal pain and constipation. Negative for diarrhea, nausea and vomiting.   Genitourinary: Positive for dysuria, frequency and urgency.        + continued vaginal bleeding   Neurological: Positive for weakness and headaches. Negative for dizziness, sensory change, speech change and focal weakness.   Endo/Heme/Allergies: Bruises/bleeds easily.   Psychiatric/Behavioral: Positive for depression. Negative for suicidal ideas. The patient is nervous/anxious. The patient does not have insomnia.    All other systems reviewed and are negative.     Physical Exam  Temp:  [36.6 °C (97.8 °F)-36.9 °C (98.5 °F)] 36.9 °C (98.5 °F)  Pulse:  [81-97] 81  Resp:  [16] 16  BP: (132-166)/(68-79) 132/70  SpO2:  [94 %-100 %] 100 %    Physical Exam  Vitals and nursing note reviewed.   Constitutional:       General: She is awake. She is not in acute distress.     Appearance: Normal appearance.   HENT:      Head: Normocephalic and atraumatic.      Mouth/Throat:      Lips: Pink.      Mouth: Mucous membranes are moist.   Eyes:      Conjunctiva/sclera: Conjunctivae normal.      Pupils: Pupils are equal, round, and reactive to light.   Cardiovascular:      Rate and Rhythm: Normal rate and regular rhythm.      Pulses: Normal pulses.      Heart sounds: Normal heart sounds.   Pulmonary:      Effort: Pulmonary effort is normal.      Breath sounds: Examination of the right-lower field reveals decreased breath sounds. Examination of the left-lower field reveals decreased breath sounds. Decreased breath sounds present.   Abdominal:      General: Bowel sounds are decreased. There is distension. There is no abdominal bruit.      Palpations: Abdomen is soft.      Tenderness: There is abdominal tenderness in the suprapubic area.   Musculoskeletal:      Cervical back: Normal range of  motion and neck supple.      Right lower le+ Pitting Edema present.      Left lower le+ Pitting Edema present.   Skin:     General: Skin is warm and dry.   Neurological:      General: No focal deficit present.      Mental Status: She is alert and oriented to person, place, and time.   Psychiatric:         Attention and Perception: Attention and perception normal.         Mood and Affect: Affect normal. Mood is anxious and depressed.         Speech: Speech normal.         Behavior: Behavior is slowed. Behavior is cooperative.         Thought Content: Thought content normal.         Judgment: Judgment normal.     Fluids    Intake/Output Summary (Last 24 hours) at 3/27/2021 1108  Last data filed at 3/26/2021 2000  Gross per 24 hour   Intake 240 ml   Output no documentation   Net 240 ml     Laboratory  Recent Labs     21  0220 21  0839 21  2248 21  0330 21  0931   WBC 9.3  --  8.1  --   --  10.3  --    RBC 3.99*  --  4.20  --   --  4.35  --    HEMOGLOBIN 7.1*   < > 7.7*   < > 8.5* 8.3* 9.0*   HEMATOCRIT 24.9*  --  26.1*  --   --  28.5*  --    MCV 62.4*  --  62.1*  --   --  65.5*  --    MCH 17.8*  --  18.3*  --   --  19.1*  --    MCHC 28.5*  --  29.5*  --   --  29.1*  --    RDW 42.3  --  43.4  --   --  50.3*  --    PLATELETCT 370  --  367  --   --  332  --    MPV 9.8  --  9.1  --   --  9.7  --     < > = values in this interval not displayed.     Recent Labs     21   SODIUM 135 138   POTASSIUM 4.0 3.9   CHLORIDE 102 102   CO2 24 25   GLUCOSE 126* 111*   BUN 13 13   CREATININE 0.99 1.02   CALCIUM 9.4 8.8     Recent Labs     21   APTT 30.1   INR 1.07     Imaging  MR-PELVIS-WITH & W/O AND SEQUENCES   Final Result         1. Stage IV endometrial cancer. The endometrial mass itself measures 13 cm, invading through the anterior uterine wall and uterine fundus.   2. Metastatic parametrial, external iliac and para-aortic  lymphadenopathy.   3. Multiple pelvic osseous metastases.   4. Several uterine fibroids.            US-PELVIC TRANSABDOMINAL ONLY   Final Result      Enlarged, heterogeneous uterus could indicate underlying fibroids though malignancy cannot be excluded.      Endometrial stripe not visualized.      CT-CTA COMPLETE THORACOABDOMINAL AORTA   Final Result      No evidence of aortic aneurysm or dissection.      Partial visualization of a markedly enlarged heterogeneous uterus with hypervascularity concerning for malignancy versus fibroids.      Diverticulosis without evidence of diverticulitis.      Bilateral hydronephrosis and hydroureter likely related to enlarged uterus.      Nonobstructive left renal stone.      Bilateral pulmonary nodules measuring up to 5 mm.      Nodule recommendations:   Low Risk: No routine follow-up      High Risk: Optional CT at 12 months      Comments: Use most suspicious nodule as guide to management. Follow-up intervals may vary according to size and risk.      Low Risk - Minimal or absent history of smoking and of other known risk factors.      High Risk - History of smoking or of other known risk factors.      Note: These recommendations do not apply to lung cancer screening, patients with immunosuppression, or patients with known primary cancer.      Fleischner Society 2017 Guidelines for Management of Incidentally Detected Pulmonary Nodules in Adults      DX-CHEST-PORTABLE (1 VIEW)   Final Result      No acute cardiac or pulmonary abnormalities are identified.      US-EXTREMITY VENOUS LOWER BILAT    (Results Pending)      Assessment/Plan  * Metastatic endometrial cancer (HCC)- (present on admission)  Assessment & Plan  -Noted on CT. Also noted on pelvic US. MRI showed stage IV endometrial CA w/ an endometrial mass measuring 13 cm invading through the anterior uterine wall and uterine fundus, metastatic parametrial, external iliac, and para-aortic lymphadenopathy, and multiple pelvic  osseous metastases.   -Dr. Carey consulted 3/26. Surgery planned for Monday, 3/29 at 14:00.   -Bowel prep tomorrow with CLD. NPO at [facility] on 3/28.  -Will likely need chemo but that will be decided on at a later time.   -Baseline  of 54.   -Continue pain control.     UTI symptoms  Assessment & Plan  -With dysuria, urgency, & frequency. Check UA and culture.     Anemia, acute blood loss and iron deficiency- (present on admission)  Assessment & Plan  -S/p transfusion of 1u PRBC on arrival to ED for hgb 7.1, symptomatic.   -Anemia workup showed iron deficiency. Giving IV iron today.   -Check CBC daily. Goal hgb >/= 9 prior to surgery.     Peripheral edema- (present on admission)  Assessment & Plan  -Check bilat DVT study.     Constipation- (present on admission)  Assessment & Plan  -Continue bowel protocol.   -Scheduled to start bowel prep tomorrow.     Stage 3 chronic kidney disease- (present on admission)  Assessment & Plan  -Chronic and stable at her baseline.   -Avoid nephrotoxins.   -Renally adjust all applicable medications.   -BMP daily for now.     Hyperlipidemia- (present on admission)  Assessment & Plan  -Elevated Tchol & LDL noted on lipid profile done in Jan 2021. Pt has been non-compliant with statin.   -Restart pravastatin while inpatient.     Prediabetes- (present on admission)  Assessment & Plan  -HgbA1c 6.1% on 1/30/2021.   -Follow up outpatient.     Essential hypertension- (present on admission)  Assessment & Plan  -Well controlled on current regimen.   -Continue amlodipine.      VTE prophylaxis: SCDs given active bleeding and anemia requiring transfusion.

## 2021-03-28 PROBLEM — E87.1 HYPONATREMIA: Status: ACTIVE | Noted: 2021-03-28

## 2021-03-28 LAB
ALBUMIN SERPL BCP-MCNC: 3.3 G/DL (ref 3.2–4.9)
ALBUMIN/GLOB SERPL: 0.8 G/DL
ALP SERPL-CCNC: 116 U/L (ref 30–99)
ALT SERPL-CCNC: 7 U/L (ref 2–50)
ANION GAP SERPL CALC-SCNC: 13 MMOL/L (ref 7–16)
AST SERPL-CCNC: <5 U/L (ref 12–45)
BASOPHILS # BLD AUTO: 0.2 % (ref 0–1.8)
BASOPHILS # BLD: 0.03 K/UL (ref 0–0.12)
BILIRUB SERPL-MCNC: 0.2 MG/DL (ref 0.1–1.5)
BUN SERPL-MCNC: 11 MG/DL (ref 8–22)
CALCIUM SERPL-MCNC: 9 MG/DL (ref 8.5–10.5)
CHLORIDE SERPL-SCNC: 98 MMOL/L (ref 96–112)
CO2 SERPL-SCNC: 23 MMOL/L (ref 20–33)
CREAT SERPL-MCNC: 0.76 MG/DL (ref 0.5–1.4)
EOSINOPHIL # BLD AUTO: 0.17 K/UL (ref 0–0.51)
EOSINOPHIL NFR BLD: 1.4 % (ref 0–6.9)
ERYTHROCYTE [DISTWIDTH] IN BLOOD BY AUTOMATED COUNT: 52.2 FL (ref 35.9–50)
GLOBULIN SER CALC-MCNC: 4 G/DL (ref 1.9–3.5)
GLUCOSE SERPL-MCNC: 133 MG/DL (ref 65–99)
HCT VFR BLD AUTO: 29.6 % (ref 37–47)
HGB BLD-MCNC: 8.5 G/DL (ref 12–16)
IMM GRANULOCYTES # BLD AUTO: 0.05 K/UL (ref 0–0.11)
IMM GRANULOCYTES NFR BLD AUTO: 0.4 % (ref 0–0.9)
LYMPHOCYTES # BLD AUTO: 0.98 K/UL (ref 1–4.8)
LYMPHOCYTES NFR BLD: 8 % (ref 22–41)
MCH RBC QN AUTO: 19.1 PG (ref 27–33)
MCHC RBC AUTO-ENTMCNC: 28.7 G/DL (ref 33.6–35)
MCV RBC AUTO: 66.5 FL (ref 81.4–97.8)
MONOCYTES # BLD AUTO: 0.86 K/UL (ref 0–0.85)
MONOCYTES NFR BLD AUTO: 7 % (ref 0–13.4)
NEUTROPHILS # BLD AUTO: 10.18 K/UL (ref 2–7.15)
NEUTROPHILS NFR BLD: 83 % (ref 44–72)
NRBC # BLD AUTO: 0 K/UL
NRBC BLD-RTO: 0 /100 WBC
PLATELET # BLD AUTO: 376 K/UL (ref 164–446)
PMV BLD AUTO: 9.4 FL (ref 9–12.9)
POTASSIUM SERPL-SCNC: 3.8 MMOL/L (ref 3.6–5.5)
PROT SERPL-MCNC: 7.3 G/DL (ref 6–8.2)
RBC # BLD AUTO: 4.45 M/UL (ref 4.2–5.4)
SODIUM SERPL-SCNC: 134 MMOL/L (ref 135–145)
WBC # BLD AUTO: 12.3 K/UL (ref 4.8–10.8)

## 2021-03-28 PROCEDURE — 700102 HCHG RX REV CODE 250 W/ 637 OVERRIDE(OP): Performed by: NURSE PRACTITIONER

## 2021-03-28 PROCEDURE — 700105 HCHG RX REV CODE 258: Performed by: NURSE PRACTITIONER

## 2021-03-28 PROCEDURE — 85025 COMPLETE CBC W/AUTO DIFF WBC: CPT

## 2021-03-28 PROCEDURE — 700102 HCHG RX REV CODE 250 W/ 637 OVERRIDE(OP): Performed by: STUDENT IN AN ORGANIZED HEALTH CARE EDUCATION/TRAINING PROGRAM

## 2021-03-28 PROCEDURE — 99233 SBSQ HOSP IP/OBS HIGH 50: CPT | Performed by: STUDENT IN AN ORGANIZED HEALTH CARE EDUCATION/TRAINING PROGRAM

## 2021-03-28 PROCEDURE — A9270 NON-COVERED ITEM OR SERVICE: HCPCS | Performed by: NURSE PRACTITIONER

## 2021-03-28 PROCEDURE — 36430 TRANSFUSION BLD/BLD COMPNT: CPT

## 2021-03-28 PROCEDURE — A9270 NON-COVERED ITEM OR SERVICE: HCPCS | Performed by: STUDENT IN AN ORGANIZED HEALTH CARE EDUCATION/TRAINING PROGRAM

## 2021-03-28 PROCEDURE — 36415 COLL VENOUS BLD VENIPUNCTURE: CPT

## 2021-03-28 PROCEDURE — P9016 RBC LEUKOCYTES REDUCED: HCPCS

## 2021-03-28 PROCEDURE — 700111 HCHG RX REV CODE 636 W/ 250 OVERRIDE (IP): Performed by: NURSE PRACTITIONER

## 2021-03-28 PROCEDURE — 700101 HCHG RX REV CODE 250: Performed by: NURSE PRACTITIONER

## 2021-03-28 PROCEDURE — 770004 HCHG ROOM/CARE - ONCOLOGY PRIVATE *

## 2021-03-28 PROCEDURE — 86923 COMPATIBILITY TEST ELECTRIC: CPT

## 2021-03-28 PROCEDURE — 80053 COMPREHEN METABOLIC PANEL: CPT

## 2021-03-28 RX ORDER — DIPHENHYDRAMINE HCL 25 MG
25 TABLET ORAL ONCE
Status: COMPLETED | OUTPATIENT
Start: 2021-03-28 | End: 2021-03-28

## 2021-03-28 RX ORDER — ACETAMINOPHEN 325 MG/1
650 TABLET ORAL ONCE
Status: COMPLETED | OUTPATIENT
Start: 2021-03-28 | End: 2021-03-28

## 2021-03-28 RX ADMIN — MORPHINE SULFATE 2 MG: 4 INJECTION INTRAVENOUS at 09:15

## 2021-03-28 RX ADMIN — MORPHINE SULFATE 2 MG: 4 INJECTION INTRAVENOUS at 12:36

## 2021-03-28 RX ADMIN — DIPHENHYDRAMINE HYDROCHLORIDE 25 MG: 25 TABLET ORAL at 14:49

## 2021-03-28 RX ADMIN — POLYETHYLENE GLYCOL 3350, SODIUM SULFATE ANHYDROUS, SODIUM BICARBONATE, SODIUM CHLORIDE, POTASSIUM CHLORIDE 4 L: 236; 22.74; 6.74; 5.86; 2.97 POWDER, FOR SOLUTION ORAL at 17:36

## 2021-03-28 RX ADMIN — MORPHINE SULFATE 2 MG: 4 INJECTION INTRAVENOUS at 01:38

## 2021-03-28 RX ADMIN — CEFTRIAXONE SODIUM 1 G: 1 INJECTION, POWDER, FOR SOLUTION INTRAMUSCULAR; INTRAVENOUS at 05:06

## 2021-03-28 RX ADMIN — AMLODIPINE BESYLATE 5 MG: 5 TABLET ORAL at 17:34

## 2021-03-28 RX ADMIN — OXYCODONE 5 MG: 5 TABLET ORAL at 05:06

## 2021-03-28 RX ADMIN — DOCUSATE SODIUM 50 MG AND SENNOSIDES 8.6 MG 2 TABLET: 8.6; 5 TABLET, FILM COATED ORAL at 05:06

## 2021-03-28 RX ADMIN — ACETAMINOPHEN 650 MG: 325 TABLET ORAL at 14:49

## 2021-03-28 RX ADMIN — PRAVASTATIN SODIUM 10 MG: 20 TABLET ORAL at 05:06

## 2021-03-28 ASSESSMENT — ENCOUNTER SYMPTOMS
MYALGIAS: 0
EYE PAIN: 0
HEADACHES: 0
NAUSEA: 0
FLANK PAIN: 0
FEVER: 0
DEPRESSION: 0
SHORTNESS OF BREATH: 0
SORE THROAT: 0
BACK PAIN: 0
VOMITING: 0
DIARRHEA: 0
BRUISES/BLEEDS EASILY: 0
COUGH: 0
CHILLS: 0
NECK PAIN: 0
CONSTIPATION: 0
ABDOMINAL PAIN: 0
SINUS PAIN: 0
BLOOD IN STOOL: 0
ABDOMINAL PAIN: 1
NERVOUS/ANXIOUS: 0
DIZZINESS: 0

## 2021-03-28 ASSESSMENT — COGNITIVE AND FUNCTIONAL STATUS - GENERAL
HELP NEEDED FOR BATHING: A LITTLE
SUGGESTED CMS G CODE MODIFIER DAILY ACTIVITY: CJ
DRESSING REGULAR LOWER BODY CLOTHING: A LITTLE
SUGGESTED CMS G CODE MODIFIER MOBILITY: CH
DAILY ACTIVITIY SCORE: 22
MOBILITY SCORE: 24

## 2021-03-28 ASSESSMENT — PAIN DESCRIPTION - PAIN TYPE
TYPE: ACUTE PAIN;CHRONIC PAIN
TYPE: ACUTE PAIN
TYPE: ACUTE PAIN;CHRONIC PAIN
TYPE: ACUTE PAIN
TYPE: ACUTE PAIN;CHRONIC PAIN
TYPE: ACUTE PAIN;CHRONIC PAIN
TYPE: ACUTE PAIN

## 2021-03-28 NOTE — CARE PLAN
Problem: Pain Management  Goal: Pain level will decrease to patient's comfort goal  Outcome: PROGRESSING AS EXPECTED  Intervention: Follow pain managment plan developed in collaboration with patient and Interdisciplinary Team  Note: Pt reports discomfort and pain at an 8. Pt reports that is manageable level for her. Will continue to reassess pain. PRNs available.  Problem: Safety  Goal: Will remain free from falls  Outcome: PROGRESSING AS EXPECTED  Intervention: Implement fall precautions  Flowsheets  Taken 3/27/2021 2147  Chair/Bed Strip Alarm: Yes - Alarm On  Taken 3/27/2021 2000  Environmental Precautions:   Treaded Slipper Socks on Patient   Personal Belongings, Wastebasket, Call Bell etc. in Easy Reach   Transferred to Stronger Side   Report Given to Other Health Care Providers Regarding Fall Risk   Bed in Low Position   Communication Sign for Patients & Families   Mobility Assessed & Appropriate Sign Placed  Note: Fall prevention and education in place. Pt complies with fall protocols.

## 2021-03-28 NOTE — PROGRESS NOTES
GYN/Oncology Progress Note               Author: ZOHAIB Shahid Date & Time created: 3/28/2021  11:22 AM     Interval History:  Patient pain well controlled. +dysuria    Review of Systems:  Review of Systems   Constitutional: Negative for chills and fever.   Respiratory: Negative for cough and shortness of breath.    Cardiovascular: Negative for chest pain and leg swelling.   Gastrointestinal: Negative for abdominal pain, constipation, nausea and vomiting.   Genitourinary: Positive for dysuria.       Physical Exam:  Physical Exam  Constitutional:       Appearance: Normal appearance.   Cardiovascular:      Pulses: Normal pulses.   Pulmonary:      Effort: Pulmonary effort is normal.   Abdominal:      Palpations: Abdomen is soft. There is mass.   Skin:     General: Skin is warm and dry.      Capillary Refill: Capillary refill takes 2 to 3 seconds.   Neurological:      Mental Status: She is alert and oriented to person, place, and time.         Labs:          Recent Labs     03/25/21 2150 03/26/21 0220 03/27/21 0330 03/28/21  0008   SODIUM 135  --  138 134*   POTASSIUM 4.0  --  3.9 3.8   CHLORIDE 102  --  102 98   CO2 24  --  25 23   BUN 13  --  13 11   CREATININE 0.99  --  1.02 0.76   MAGNESIUM  --  2.0  --   --    PHOSPHORUS  --  3.4  --   --    CALCIUM 9.4  --  8.8 9.0     Recent Labs     03/25/21 2150 03/27/21 0330 03/28/21 0008   ALTSGPT 8 6 7   ASTSGOT 21 15 <5*   ALKPHOSPHAT 119* 110* 116*   TBILIRUBIN 0.3 0.2 0.2   LIPASE 21  --   --    GLUCOSE 126* 111* 133*     Recent Labs     03/26/21 0220 03/26/21  0839 03/27/21 0330 03/27/21  0931 03/28/21  0008   RBC 4.20  --  4.35  --  4.45   HEMOGLOBIN 7.7*   < > 8.3* 9.0* 8.5*   HEMATOCRIT 26.1*  --  28.5*  --  29.6*   PLATELETCT 367  --  332  --  376   PROTHROMBTM  --   --  14.2  --   --    APTT  --   --  30.1  --   --    INR  --   --  1.07  --   --    IRON  --   --  25*  --   --    FERRITIN  --   --  13.2  --   --    TOTIRONBC  --   --  316  --   --      < > = values in this interval not displayed.     Recent Labs     21  0220 21  0008   WBC 9.3   < > 8.1 10.3 12.3*   NEUTSPOLYS 73.90*   < > 70.40 70.20 83.00*   LYMPHOCYTES 14.70*   < > 16.10* 16.40* 8.00*   MONOCYTES 9.40   < > 10.10 9.50 7.00   EOSINOPHILS 1.10   < > 2.30 3.00 1.40   BASOPHILS 0.50   < > 0.90 0.40 0.20   ASTSGOT 21  --   --  15 <5*   ALTSGPT 8  --   --  6 7   ALKPHOSPHAT 119*  --   --  110* 116*   TBILIRUBIN 0.3  --   --  0.2 0.2    < > = values in this interval not displayed.     Recent Labs     21  0008   SODIUM 135 138 134*   POTASSIUM 4.0 3.9 3.8   CHLORIDE 102 102 98   CO2 24 25 23   GLUCOSE 126* 111* 133*   BUN 13 13 11   CREATININE 0.99 1.02 0.76   CALCIUM 9.4 8.8 9.0     Hemodynamics:  Temp (24hrs), Av.1 °C (98.7 °F), Min:36.8 °C (98.2 °F), Max:37.5 °C (99.5 °F)  Temperature: 37.1 °C (98.8 °F)  Pulse  Av.1  Min: 73  Max: 99   Blood Pressure : 135/66     Respiratory:    Respiration: 16, Pulse Oximetry: 99 %        RUL Breath Sounds: Clear, RML Breath Sounds: Clear, RLL Breath Sounds: Diminished, KAREN Breath Sounds: Clear, LLL Breath Sounds: Diminished  Fluids:    Intake/Output Summary (Last 24 hours) at 3/28/2021 1122  Last data filed at 3/27/2021 1600  Gross per 24 hour   Intake 240 ml   Output --   Net 240 ml        GI/Nutrition:  Orders Placed This Encounter   Procedures   • Diet Order Diet: Clear Liquid     Standing Status:   Standing     Number of Occurrences:   1     Order Specific Question:   Diet:     Answer:   Clear Liquid [10]     Medical Decision Making, by Problem:  Active Hospital Problems    Diagnosis    • *Metastatic endometrial cancer (HCC) [C54.1]    • Cystitis [N30.90]    • Anemia, acute blood loss and iron deficiency [D64.9]    • Constipation [K59.00]    • Peripheral edema [R60.9]    • Stage 3 chronic kidney disease [N18.30]    • Hyperlipidemia [E78.5]    • Essential  hypertension [I10]    • Prediabetes [R73.03]        Plan:  1. Endometrial mass: planned for exploratory laparotomy with total abdominal hysterectomy and bilateral salpingo-oophorectomy, possible pelvic and/or periaortic lymphadenectomy, tumor debulking, omentectomy, and large and/or small bowel resection depending upon intraoperative findings and frozen section.tomorrow at 2 pm. NPO at 2 am, bowel prep today. Consents written,  54  2. Leukocytosis: +dysuria, urine culture pending      Quality-Core Measures

## 2021-03-28 NOTE — PROGRESS NOTES
[facility] Medicine Daily Progress Note    Date of Service  3/28/2021    Chief Complaint  71 y.o. female admitted 3/25/2021 with symptomatic anemia due to AUB. Workup revealed new diagnosis of Stage IV uterine cancer.    Hospital Course  Ms. Desai is a pleasant 70 y/o  female with a PMHx of HTN, vaginal bleeding, and CKD who presented to the ED on 3/25/2021 with complaints of chest and back pain, progressive weakness, and GONZALEZ. She has also had 1 year of progressive weight loss and vaginal bleeding. In the ED she was found to be anemic with a hgb of 7.1 and was transfused 1 unit of PRBC. Her EKG showed no ischemia or infarct, though her d-dimer was elevated so a CT scan was done and was negative for aortic aneurysm or dissection but did have partial visualization of a markedly enlarged heterogeneous uterus with hypervascularity concerning for malignancy versus fibroids. A pelvic ultrasound was then done and showed most of the same findings so an MRI of the pelvis was then performed and revealed stage IV endometrial cancer with an endometrial mass measuring 13 cm invading through the anterior uterine wall and uterine fundus, metastatic parametrial, external iliac and para-aortic lymphadenopathy, and multiple pelvic osseous metastases.      Interval Problem Update  Transferred to R3 C[facility]  She reports ongoing 8/10 abdominal and pelvic pain. It worsens when she bends forward or has a BM.  The pain is improved with IV morphine.  She had a large BM yesterday that was nonbloody. She denies diarrhea.  She reports bleeding during urination and dysuria.  She denies F/C, N/V, other bleeding, presyncope, leg swelling.    I met with her and her SO Star at bedside this afternoon. They met with Ciara the GYN-ONC APRN who answered questions about the surgery. Star requested more information about what they find, when he can visit her post-op, and the next steps. I will request Dr. Quinteros notify him after  surgery.    Consultants/Specialty  Gynecology Oncology - Stage IV endometrial CA    Code Status  Full Code    Disposition  Pending recovery from Exploratory Laparotomy with TAHBSO 3/29    Review of Systems  Review of Systems   Constitutional: Negative for chills and fever.   HENT: Negative for ear pain, nosebleeds, sinus pain and sore throat.    Eyes: Negative for pain.   Respiratory: Negative for shortness of breath.    Cardiovascular: Negative for chest pain and leg swelling.   Gastrointestinal: Positive for abdominal pain. Negative for blood in stool, constipation, diarrhea, melena, nausea and vomiting.   Genitourinary: Positive for dysuria and hematuria. Negative for flank pain.   Musculoskeletal: Negative for back pain, joint pain, myalgias and neck pain.   Skin: Negative for rash.   Neurological: Negative for dizziness and headaches.   Endo/Heme/Allergies: Does not bruise/bleed easily.   Psychiatric/Behavioral: Negative for depression. The patient is not nervous/anxious.         Physical Exam  Temp:  [37 °C (98.6 °F)-37.5 °C (99.5 °F)] 37 °C (98.6 °F)  Pulse:  [76-97] 97  Resp:  [16-20] 20  BP: (135-154)/(64-73) 135/64  SpO2:  [93 %-99 %] 96 %    Physical Exam  Vitals and nursing note reviewed.   HENT:      Head:      Comments: Bitemporal wasting     Nose: Nose normal.      Mouth/Throat:      Mouth: Mucous membranes are moist.      Pharynx: Oropharynx is clear.   Eyes:      General: No scleral icterus.     Conjunctiva/sclera: Conjunctivae normal.   Cardiovascular:      Rate and Rhythm: Normal rate and regular rhythm.      Pulses: Normal pulses.      Heart sounds: Normal heart sounds. No murmur. No friction rub. No gallop.    Pulmonary:      Effort: Pulmonary effort is normal. No respiratory distress.      Breath sounds: Normal breath sounds. No wheezing, rhonchi or rales.   Abdominal:      General: Abdomen is protuberant. Bowel sounds are decreased.      Palpations: Abdomen is rigid. There is mass.       Tenderness: There is abdominal tenderness (Diffuse). There is no guarding or rebound.   Genitourinary:     Comments: No dunn  Musculoskeletal:      Cervical back: Neck supple.      Right lower leg: Edema present.      Left lower leg: Edema present.      Comments: Non-pitting edema to thighs   Skin:     General: Skin is warm and dry.   Neurological:      Mental Status: She is alert.   Psychiatric:         Attention and Perception: Attention and perception normal.         Mood and Affect: Mood normal. Affect is blunt.         Speech: Speech normal.         Behavior: Behavior is cooperative.         Thought Content: Thought content normal.         Cognition and Memory: She exhibits impaired recent memory.         Judgment: Judgment normal.         Fluids    Intake/Output Summary (Last 24 hours) at 3/28/2021 1554  Last data filed at 3/27/2021 1600  Gross per 24 hour   Intake 240 ml   Output --   Net 240 ml       Laboratory  Recent Labs     03/26/21  0220 03/26/21  0839 03/27/21  0330 03/27/21  0931 03/28/21  0008   WBC 8.1  --  10.3  --  12.3*   RBC 4.20  --  4.35  --  4.45   HEMOGLOBIN 7.7*   < > 8.3* 9.0* 8.5*   HEMATOCRIT 26.1*  --  28.5*  --  29.6*   MCV 62.1*  --  65.5*  --  66.5*   MCH 18.3*  --  19.1*  --  19.1*   MCHC 29.5*  --  29.1*  --  28.7*   RDW 43.4  --  50.3*  --  52.2*   PLATELETCT 367  --  332  --  376   MPV 9.1  --  9.7  --  9.4    < > = values in this interval not displayed.     Recent Labs     03/25/21 2150 03/27/21  0330 03/28/21  0008   SODIUM 135 138 134*   POTASSIUM 4.0 3.9 3.8   CHLORIDE 102 102 98   CO2 24 25 23   GLUCOSE 126* 111* 133*   BUN 13 13 11   CREATININE 0.99 1.02 0.76   CALCIUM 9.4 8.8 9.0     Recent Labs     03/27/21  0330   APTT 30.1   INR 1.07               Imaging  US-EXTREMITY VENOUS LOWER BILAT   Final Result      MR-PELVIS-WITH & W/O AND SEQUENCES   Final Result         1. Stage IV endometrial cancer. The endometrial mass itself measures 13 cm, invading through the anterior  uterine wall and uterine fundus.   2. Metastatic parametrial, external iliac and para-aortic lymphadenopathy.   3. Multiple pelvic osseous metastases.   4. Several uterine fibroids.            US-PELVIC TRANSABDOMINAL ONLY   Final Result      Enlarged, heterogeneous uterus could indicate underlying fibroids though malignancy cannot be excluded.      Endometrial stripe not visualized.      CT-CTA COMPLETE THORACOABDOMINAL AORTA   Final Result      No evidence of aortic aneurysm or dissection.      Partial visualization of a markedly enlarged heterogeneous uterus with hypervascularity concerning for malignancy versus fibroids.      Diverticulosis without evidence of diverticulitis.      Bilateral hydronephrosis and hydroureter likely related to enlarged uterus.      Nonobstructive left renal stone.      Bilateral pulmonary nodules measuring up to 5 mm.      Nodule recommendations:   Low Risk: No routine follow-up      High Risk: Optional CT at 12 months      Comments: Use most suspicious nodule as guide to management. Follow-up intervals may vary according to size and risk.      Low Risk - Minimal or absent history of smoking and of other known risk factors.      High Risk - History of smoking or of other known risk factors.      Note: These recommendations do not apply to lung cancer screening, patients with immunosuppression, or patients with known primary cancer.      Fleischner Society 2017 Guidelines for Management of Incidentally Detected Pulmonary Nodules in Adults      DX-CHEST-PORTABLE (1 VIEW)   Final Result      No acute cardiac or pulmonary abnormalities are identified.           Assessment/Plan  * Metastatic endometrial cancer (HCC)- (present on admission)  Assessment & Plan  Resented with symptomatic SARAH from uterine bleeding  CT demonstrated large mass also seen on pelvic US  MRI showed stage IV endometrial CA w/ an endometrial mass measuring 13 cm invading through the anterior uterine wall and uterine  fundus, metastatic parametrial, external iliac, and para-aortic lymphadenopathy, and multiple pelvic osseous metastases.   GYN-ONC consulted, Dr. Quinteros recommends Ex-Lap with MACKENZIE/BSO and possible diverting colostomy 3/29  Clear Liquid diet, bowel prep, and NPO after midnight 3/28  Baseline  of 54.   Oxycodone and IV morphine PRN pain control    Constipation- (present on admission)  Assessment & Plan  Low transit due to pelvic mass and superimposed opiate-induced constipation  Pre-operative bowel prep per GYN-ONC    Cystitis  Assessment & Plan  Unable to clinically differentiate infective urethritis/cystitis from extension of endometrial cancer  Symptomatic including dysuria, urgency, and frequency  UA +pyuria, negative nitrites  UCx pending  Empiric ceftriaxone x5 days in interim due to planned gynecologic procedure 3/29    Anemia, acute blood loss and iron deficiency- (present on admission)  Assessment & Plan  Due to chronic blood loss anemia from endometrial cancer.  S/p transfusion of 1u PRBC on arrival to ED for hgb 7.1, symptomatic.   Ferritin 13 and FeSat 8% c/w SARAH, received IV Fe Dextran  Repeat AM CBC.   PRBC type & cross chintan-operative per GYN-ONC    Hyponatremia- (present on admission)  Assessment & Plan  Mild, Na 134  Repeat AM BMP    Peripheral edema- (present on admission)  Assessment & Plan  Non-pitting, likely due to lymphatic obstruction from pelvic mass  BLE US negative for DVT    Stage 3 chronic kidney disease- (present on admission)  Assessment & Plan  Ruled out.   Chart diagnosis, not supported by EGFR >60 today    Hyperlipidemia- (present on admission)  Assessment & Plan  Continue pravastatin    Prediabetes- (present on admission)  Assessment & Plan  A1c 6.1 in January 2020  Repeat A1c deferred, unreliable (under-estimates average BG) in setting of SARAH and post-transfusion due to increased turnover and foreign RBCs  POCT and SSI deferred unless sustained BG >200    Essential hypertension-  (present on admission)  Assessment & Plan  Continue amlodipine, due to long half-life discontinuing pre-anaesthesia not expected to make a difference       VTE prophylaxis: SCDs pre-procedure

## 2021-03-29 ENCOUNTER — ANESTHESIA (OUTPATIENT)
Dept: SURGERY | Facility: MEDICAL CENTER | Age: 71
DRG: 739 | End: 2021-03-29
Payer: MEDICARE

## 2021-03-29 ENCOUNTER — APPOINTMENT (OUTPATIENT)
Dept: RADIOLOGY | Facility: MEDICAL CENTER | Age: 71
DRG: 739 | End: 2021-03-29
Attending: OBSTETRICS & GYNECOLOGY
Payer: MEDICARE

## 2021-03-29 ENCOUNTER — ANESTHESIA EVENT (OUTPATIENT)
Dept: SURGERY | Facility: MEDICAL CENTER | Age: 71
DRG: 739 | End: 2021-03-29
Payer: MEDICARE

## 2021-03-29 PROBLEM — D50.0 IRON DEFICIENCY ANEMIA DUE TO CHRONIC BLOOD LOSS: Status: ACTIVE | Noted: 2021-03-26

## 2021-03-29 PROBLEM — E87.6 HYPOKALEMIA: Status: ACTIVE | Noted: 2021-03-29

## 2021-03-29 LAB
ABO GROUP BLD: NORMAL
ANION GAP SERPL CALC-SCNC: 10 MMOL/L (ref 7–16)
BACTERIA UR CULT: NORMAL
BARCODED ABORH UBTYP: 6200
BARCODED ABORH UBTYP: 6200
BARCODED PRD CODE UBPRD: NORMAL
BARCODED PRD CODE UBPRD: NORMAL
BARCODED UNIT NUM UBUNT: NORMAL
BARCODED UNIT NUM UBUNT: NORMAL
BLD GP AB SCN SERPL QL: NORMAL
BUN SERPL-MCNC: 5 MG/DL (ref 8–22)
CALCIUM SERPL-MCNC: 9 MG/DL (ref 8.5–10.5)
CHLORIDE SERPL-SCNC: 101 MMOL/L (ref 96–112)
CO2 SERPL-SCNC: 27 MMOL/L (ref 20–33)
COMPONENT R 8504R: NORMAL
COMPONENT R 8504R: NORMAL
CREAT SERPL-MCNC: 0.58 MG/DL (ref 0.5–1.4)
ERYTHROCYTE [DISTWIDTH] IN BLOOD BY AUTOMATED COUNT: 59.4 FL (ref 35.9–50)
EST. AVERAGE GLUCOSE BLD GHB EST-MCNC: 97 MG/DL
GLUCOSE SERPL-MCNC: 106 MG/DL (ref 65–99)
HBA1C MFR BLD: 5 % (ref 4–5.6)
HCT VFR BLD AUTO: 33.6 % (ref 37–47)
HGB BLD-MCNC: 10.4 G/DL (ref 12–16)
MAGNESIUM SERPL-MCNC: 1.7 MG/DL (ref 1.5–2.5)
MCH RBC QN AUTO: 21 PG (ref 27–33)
MCHC RBC AUTO-ENTMCNC: 31 G/DL (ref 33.6–35)
MCV RBC AUTO: 67.7 FL (ref 81.4–97.8)
PLATELET # BLD AUTO: 352 K/UL (ref 164–446)
PMV BLD AUTO: 8.8 FL (ref 9–12.9)
POTASSIUM SERPL-SCNC: 3.4 MMOL/L (ref 3.6–5.5)
PRODUCT TYPE UPROD: NORMAL
PRODUCT TYPE UPROD: NORMAL
RBC # BLD AUTO: 4.96 M/UL (ref 4.2–5.4)
RH BLD: NORMAL
SIGNIFICANT IND 70042: NORMAL
SITE SITE: NORMAL
SODIUM SERPL-SCNC: 138 MMOL/L (ref 135–145)
SOURCE SOURCE: NORMAL
UNIT STATUS USTAT: NORMAL
UNIT STATUS USTAT: NORMAL
WBC # BLD AUTO: 14.4 K/UL (ref 4.8–10.8)

## 2021-03-29 PROCEDURE — 74018 RADEX ABDOMEN 1 VIEW: CPT

## 2021-03-29 PROCEDURE — 83036 HEMOGLOBIN GLYCOSYLATED A1C: CPT

## 2021-03-29 PROCEDURE — C1769 GUIDE WIRE: HCPCS | Performed by: OBSTETRICS & GYNECOLOGY

## 2021-03-29 PROCEDURE — 07BD0ZX EXCISION OF AORTIC LYMPHATIC, OPEN APPROACH, DIAGNOSTIC: ICD-10-PCS | Performed by: OBSTETRICS & GYNECOLOGY

## 2021-03-29 PROCEDURE — 86923 COMPATIBILITY TEST ELECTRIC: CPT

## 2021-03-29 PROCEDURE — 700101 HCHG RX REV CODE 250: Performed by: OBSTETRICS & GYNECOLOGY

## 2021-03-29 PROCEDURE — 160009 HCHG ANES TIME/MIN: Performed by: OBSTETRICS & GYNECOLOGY

## 2021-03-29 PROCEDURE — 0DBU0ZZ EXCISION OF OMENTUM, OPEN APPROACH: ICD-10-PCS | Performed by: OBSTETRICS & GYNECOLOGY

## 2021-03-29 PROCEDURE — 64486 TAP BLOCK UNIL BY INJECTION: CPT | Performed by: OBSTETRICS & GYNECOLOGY

## 2021-03-29 PROCEDURE — 700102 HCHG RX REV CODE 250 W/ 637 OVERRIDE(OP): Performed by: NURSE PRACTITIONER

## 2021-03-29 PROCEDURE — 501428 HCHG STAPLER, CURVED: Performed by: OBSTETRICS & GYNECOLOGY

## 2021-03-29 PROCEDURE — 501838 HCHG SUTURE GENERAL: Performed by: OBSTETRICS & GYNECOLOGY

## 2021-03-29 PROCEDURE — 0UT70ZZ RESECTION OF BILATERAL FALLOPIAN TUBES, OPEN APPROACH: ICD-10-PCS | Performed by: OBSTETRICS & GYNECOLOGY

## 2021-03-29 PROCEDURE — 86901 BLOOD TYPING SEROLOGIC RH(D): CPT

## 2021-03-29 PROCEDURE — 83735 ASSAY OF MAGNESIUM: CPT

## 2021-03-29 PROCEDURE — 0TN70ZZ RELEASE LEFT URETER, OPEN APPROACH: ICD-10-PCS | Performed by: OBSTETRICS & GYNECOLOGY

## 2021-03-29 PROCEDURE — 99232 SBSQ HOSP IP/OBS MODERATE 35: CPT | Performed by: STUDENT IN AN ORGANIZED HEALTH CARE EDUCATION/TRAINING PROGRAM

## 2021-03-29 PROCEDURE — 86900 BLOOD TYPING SEROLOGIC ABO: CPT

## 2021-03-29 PROCEDURE — 36430 TRANSFUSION BLD/BLD COMPNT: CPT

## 2021-03-29 PROCEDURE — 160002 HCHG RECOVERY MINUTES (STAT): Performed by: OBSTETRICS & GYNECOLOGY

## 2021-03-29 PROCEDURE — 80048 BASIC METABOLIC PNL TOTAL CA: CPT

## 2021-03-29 PROCEDURE — 160035 HCHG PACU - 1ST 60 MINS PHASE I: Performed by: OBSTETRICS & GYNECOLOGY

## 2021-03-29 PROCEDURE — 07BC0ZX EXCISION OF PELVIS LYMPHATIC, OPEN APPROACH, DIAGNOSTIC: ICD-10-PCS | Performed by: OBSTETRICS & GYNECOLOGY

## 2021-03-29 PROCEDURE — 500371 HCHG DRAIN, BLAKE 10MM: Performed by: OBSTETRICS & GYNECOLOGY

## 2021-03-29 PROCEDURE — 0UN90ZZ RELEASE UTERUS, OPEN APPROACH: ICD-10-PCS | Performed by: OBSTETRICS & GYNECOLOGY

## 2021-03-29 PROCEDURE — 700111 HCHG RX REV CODE 636 W/ 250 OVERRIDE (IP): Performed by: ANESTHESIOLOGY

## 2021-03-29 PROCEDURE — P9016 RBC LEUKOCYTES REDUCED: HCPCS

## 2021-03-29 PROCEDURE — 84132 ASSAY OF SERUM POTASSIUM: CPT | Mod: 91

## 2021-03-29 PROCEDURE — 0TNB0ZZ RELEASE BLADDER, OPEN APPROACH: ICD-10-PCS | Performed by: OBSTETRICS & GYNECOLOGY

## 2021-03-29 PROCEDURE — C1765 ADHESION BARRIER: HCPCS | Performed by: OBSTETRICS & GYNECOLOGY

## 2021-03-29 PROCEDURE — 88309 TISSUE EXAM BY PATHOLOGIST: CPT

## 2021-03-29 PROCEDURE — 700102 HCHG RX REV CODE 250 W/ 637 OVERRIDE(OP): Performed by: ANESTHESIOLOGY

## 2021-03-29 PROCEDURE — 0UT20ZZ RESECTION OF BILATERAL OVARIES, OPEN APPROACH: ICD-10-PCS | Performed by: OBSTETRICS & GYNECOLOGY

## 2021-03-29 PROCEDURE — A9270 NON-COVERED ITEM OR SERVICE: HCPCS | Performed by: STUDENT IN AN ORGANIZED HEALTH CARE EDUCATION/TRAINING PROGRAM

## 2021-03-29 PROCEDURE — 770004 HCHG ROOM/CARE - ONCOLOGY PRIVATE *

## 2021-03-29 PROCEDURE — 700105 HCHG RX REV CODE 258: Performed by: ANESTHESIOLOGY

## 2021-03-29 PROCEDURE — 3E0T3BZ INTRODUCTION OF ANESTHETIC AGENT INTO PERIPHERAL NERVES AND PLEXI, PERCUTANEOUS APPROACH: ICD-10-PCS | Performed by: ANESTHESIOLOGY

## 2021-03-29 PROCEDURE — 88307 TISSUE EXAM BY PATHOLOGIST: CPT

## 2021-03-29 PROCEDURE — 82803 BLOOD GASES ANY COMBINATION: CPT | Mod: 91

## 2021-03-29 PROCEDURE — 88305 TISSUE EXAM BY PATHOLOGIST: CPT

## 2021-03-29 PROCEDURE — 160031 HCHG SURGERY MINUTES - 1ST 30 MINS LEVEL 5: Performed by: OBSTETRICS & GYNECOLOGY

## 2021-03-29 PROCEDURE — 501435 HCHG STAPLER, LINEAR 60: Performed by: OBSTETRICS & GYNECOLOGY

## 2021-03-29 PROCEDURE — 0DNW0ZZ RELEASE PERITONEUM, OPEN APPROACH: ICD-10-PCS | Performed by: OBSTETRICS & GYNECOLOGY

## 2021-03-29 PROCEDURE — 88342 IMHCHEM/IMCYTCHM 1ST ANTB: CPT

## 2021-03-29 PROCEDURE — 700104 HCHG RX REV CODE 254: Performed by: ANESTHESIOLOGY

## 2021-03-29 PROCEDURE — 700102 HCHG RX REV CODE 250 W/ 637 OVERRIDE(OP): Performed by: STUDENT IN AN ORGANIZED HEALTH CARE EDUCATION/TRAINING PROGRAM

## 2021-03-29 PROCEDURE — 700111 HCHG RX REV CODE 636 W/ 250 OVERRIDE (IP): Performed by: NURSE PRACTITIONER

## 2021-03-29 PROCEDURE — 0T783DZ DILATION OF BILATERAL URETERS WITH INTRALUMINAL DEVICE, PERCUTANEOUS APPROACH: ICD-10-PCS | Performed by: OBSTETRICS & GYNECOLOGY

## 2021-03-29 PROCEDURE — 0UT90ZZ RESECTION OF UTERUS, OPEN APPROACH: ICD-10-PCS | Performed by: OBSTETRICS & GYNECOLOGY

## 2021-03-29 PROCEDURE — 36415 COLL VENOUS BLD VENIPUNCTURE: CPT

## 2021-03-29 PROCEDURE — 85027 COMPLETE CBC AUTOMATED: CPT

## 2021-03-29 PROCEDURE — 88341 IMHCHEM/IMCYTCHM EA ADD ANTB: CPT | Mod: 91

## 2021-03-29 PROCEDURE — 500257: Performed by: OBSTETRICS & GYNECOLOGY

## 2021-03-29 PROCEDURE — 502704 HCHG DEVICE, LIGASURE IMPACT: Performed by: OBSTETRICS & GYNECOLOGY

## 2021-03-29 PROCEDURE — 160048 HCHG OR STATISTICAL LEVEL 1-5: Performed by: OBSTETRICS & GYNECOLOGY

## 2021-03-29 PROCEDURE — 160042 HCHG SURGERY MINUTES - EA ADDL 1 MIN LEVEL 5: Performed by: OBSTETRICS & GYNECOLOGY

## 2021-03-29 PROCEDURE — 85014 HEMATOCRIT: CPT | Mod: 91

## 2021-03-29 PROCEDURE — 700101 HCHG RX REV CODE 250: Performed by: ANESTHESIOLOGY

## 2021-03-29 PROCEDURE — 501460 HCHG STAPLER, TA55 35LD: Performed by: OBSTETRICS & GYNECOLOGY

## 2021-03-29 PROCEDURE — A9270 NON-COVERED ITEM OR SERVICE: HCPCS | Performed by: ANESTHESIOLOGY

## 2021-03-29 PROCEDURE — 0TN60ZZ RELEASE RIGHT URETER, OPEN APPROACH: ICD-10-PCS | Performed by: OBSTETRICS & GYNECOLOGY

## 2021-03-29 PROCEDURE — C1725 CATH, TRANSLUMIN NON-LASER: HCPCS | Performed by: OBSTETRICS & GYNECOLOGY

## 2021-03-29 PROCEDURE — 82330 ASSAY OF CALCIUM: CPT

## 2021-03-29 PROCEDURE — 0TQB0ZZ REPAIR BLADDER, OPEN APPROACH: ICD-10-PCS | Performed by: OBSTETRICS & GYNECOLOGY

## 2021-03-29 PROCEDURE — 0DBW0ZX EXCISION OF PERITONEUM, OPEN APPROACH, DIAGNOSTIC: ICD-10-PCS | Performed by: OBSTETRICS & GYNECOLOGY

## 2021-03-29 PROCEDURE — 84295 ASSAY OF SERUM SODIUM: CPT | Mod: 91

## 2021-03-29 PROCEDURE — 501330 HCHG SET, CYSTO IRRIG TUBING: Performed by: OBSTETRICS & GYNECOLOGY

## 2021-03-29 PROCEDURE — 0JN80ZZ RELEASE ABDOMEN SUBCUTANEOUS TISSUE AND FASCIA, OPEN APPROACH: ICD-10-PCS | Performed by: OBSTETRICS & GYNECOLOGY

## 2021-03-29 PROCEDURE — 700105 HCHG RX REV CODE 258: Performed by: NURSE PRACTITIONER

## 2021-03-29 PROCEDURE — 03HY32Z INSERTION OF MONITORING DEVICE INTO UPPER ARTERY, PERCUTANEOUS APPROACH: ICD-10-PCS | Performed by: ANESTHESIOLOGY

## 2021-03-29 PROCEDURE — 86850 RBC ANTIBODY SCREEN: CPT

## 2021-03-29 PROCEDURE — A9270 NON-COVERED ITEM OR SERVICE: HCPCS | Performed by: NURSE PRACTITIONER

## 2021-03-29 PROCEDURE — 160036 HCHG PACU - EA ADDL 30 MINS PHASE I: Performed by: OBSTETRICS & GYNECOLOGY

## 2021-03-29 PROCEDURE — C2617 STENT, NON-COR, TEM W/O DEL: HCPCS | Performed by: OBSTETRICS & GYNECOLOGY

## 2021-03-29 DEVICE — STENT UROLOGICAL POLARIS 6X22  ULTRA: Type: IMPLANTABLE DEVICE | Status: FUNCTIONAL

## 2021-03-29 RX ORDER — SODIUM CHLORIDE, SODIUM GLUCONATE, SODIUM ACETATE, POTASSIUM CHLORIDE AND MAGNESIUM CHLORIDE 526; 502; 368; 37; 30 MG/100ML; MG/100ML; MG/100ML; MG/100ML; MG/100ML
INJECTION, SOLUTION INTRAVENOUS
Status: DISCONTINUED | OUTPATIENT
Start: 2021-03-29 | End: 2021-03-29 | Stop reason: SURG

## 2021-03-29 RX ORDER — OXYCODONE HCL 5 MG/5 ML
10 SOLUTION, ORAL ORAL
Status: DISCONTINUED | OUTPATIENT
Start: 2021-03-29 | End: 2021-03-29 | Stop reason: HOSPADM

## 2021-03-29 RX ORDER — SODIUM CHLORIDE, SODIUM LACTATE, POTASSIUM CHLORIDE, CALCIUM CHLORIDE 600; 310; 30; 20 MG/100ML; MG/100ML; MG/100ML; MG/100ML
INJECTION, SOLUTION INTRAVENOUS CONTINUOUS
Status: DISCONTINUED | OUTPATIENT
Start: 2021-03-29 | End: 2021-03-29 | Stop reason: HOSPADM

## 2021-03-29 RX ORDER — PHENYLEPHRINE HCL IN 0.9% NACL 0.5 MG/5ML
SYRINGE (ML) INTRAVENOUS PRN
Status: DISCONTINUED | OUTPATIENT
Start: 2021-03-29 | End: 2021-03-29 | Stop reason: SURG

## 2021-03-29 RX ORDER — MAGNESIUM HYDROXIDE 1200 MG/15ML
LIQUID ORAL
Status: DISCONTINUED | OUTPATIENT
Start: 2021-03-29 | End: 2021-03-29 | Stop reason: HOSPADM

## 2021-03-29 RX ORDER — CELECOXIB 200 MG/1
200 CAPSULE ORAL ONCE
Status: DISCONTINUED | OUTPATIENT
Start: 2021-03-29 | End: 2021-03-29

## 2021-03-29 RX ORDER — IPRATROPIUM BROMIDE AND ALBUTEROL SULFATE 2.5; .5 MG/3ML; MG/3ML
3 SOLUTION RESPIRATORY (INHALATION)
Status: DISCONTINUED | OUTPATIENT
Start: 2021-03-29 | End: 2021-03-29 | Stop reason: HOSPADM

## 2021-03-29 RX ORDER — HYDROMORPHONE HYDROCHLORIDE 2 MG/ML
INJECTION, SOLUTION INTRAMUSCULAR; INTRAVENOUS; SUBCUTANEOUS PRN
Status: DISCONTINUED | OUTPATIENT
Start: 2021-03-29 | End: 2021-03-29 | Stop reason: SURG

## 2021-03-29 RX ORDER — ACETAMINOPHEN 500 MG
1000 TABLET ORAL ONCE
Status: DISCONTINUED | OUTPATIENT
Start: 2021-03-29 | End: 2021-03-29

## 2021-03-29 RX ORDER — SODIUM CHLORIDE 9 MG/ML
INJECTION, SOLUTION INTRAVENOUS CONTINUOUS
Status: ACTIVE | OUTPATIENT
Start: 2021-03-29 | End: 2021-03-30

## 2021-03-29 RX ORDER — CALCIUM CHLORIDE 100 MG/ML
INJECTION INTRAVENOUS; INTRAVENTRICULAR PRN
Status: DISCONTINUED | OUTPATIENT
Start: 2021-03-29 | End: 2021-03-29 | Stop reason: SURG

## 2021-03-29 RX ORDER — ONDANSETRON 2 MG/ML
4 INJECTION INTRAMUSCULAR; INTRAVENOUS
Status: DISCONTINUED | OUTPATIENT
Start: 2021-03-29 | End: 2021-03-29 | Stop reason: HOSPADM

## 2021-03-29 RX ORDER — POTASSIUM CHLORIDE 20 MEQ/1
40 TABLET, EXTENDED RELEASE ORAL ONCE
Status: COMPLETED | OUTPATIENT
Start: 2021-03-29 | End: 2021-03-29

## 2021-03-29 RX ORDER — ROCURONIUM BROMIDE 10 MG/ML
INJECTION, SOLUTION INTRAVENOUS PRN
Status: DISCONTINUED | OUTPATIENT
Start: 2021-03-29 | End: 2021-03-29 | Stop reason: SURG

## 2021-03-29 RX ORDER — ROPIVACAINE HYDROCHLORIDE 5 MG/ML
INJECTION, SOLUTION EPIDURAL; INFILTRATION; PERINEURAL
Status: COMPLETED | OUTPATIENT
Start: 2021-03-29 | End: 2021-03-29

## 2021-03-29 RX ORDER — HYDROMORPHONE HYDROCHLORIDE 1 MG/ML
0.4 INJECTION, SOLUTION INTRAMUSCULAR; INTRAVENOUS; SUBCUTANEOUS
Status: DISCONTINUED | OUTPATIENT
Start: 2021-03-29 | End: 2021-03-29 | Stop reason: HOSPADM

## 2021-03-29 RX ORDER — ONDANSETRON 2 MG/ML
INJECTION INTRAMUSCULAR; INTRAVENOUS PRN
Status: DISCONTINUED | OUTPATIENT
Start: 2021-03-29 | End: 2021-03-29 | Stop reason: SURG

## 2021-03-29 RX ORDER — CEFAZOLIN SODIUM 1 G/3ML
INJECTION, POWDER, FOR SOLUTION INTRAMUSCULAR; INTRAVENOUS PRN
Status: DISCONTINUED | OUTPATIENT
Start: 2021-03-29 | End: 2021-03-29 | Stop reason: SURG

## 2021-03-29 RX ORDER — ACETAMINOPHEN 500 MG
1000 TABLET ORAL ONCE
Status: COMPLETED | OUTPATIENT
Start: 2021-03-29 | End: 2021-03-29

## 2021-03-29 RX ORDER — HYDROMORPHONE HYDROCHLORIDE 1 MG/ML
0.1 INJECTION, SOLUTION INTRAMUSCULAR; INTRAVENOUS; SUBCUTANEOUS
Status: DISCONTINUED | OUTPATIENT
Start: 2021-03-29 | End: 2021-03-29 | Stop reason: HOSPADM

## 2021-03-29 RX ORDER — OXYCODONE HCL 5 MG/5 ML
5 SOLUTION, ORAL ORAL
Status: DISCONTINUED | OUTPATIENT
Start: 2021-03-29 | End: 2021-03-29 | Stop reason: HOSPADM

## 2021-03-29 RX ORDER — HALOPERIDOL 5 MG/ML
1 INJECTION INTRAMUSCULAR
Status: DISCONTINUED | OUTPATIENT
Start: 2021-03-29 | End: 2021-03-29 | Stop reason: HOSPADM

## 2021-03-29 RX ORDER — CELECOXIB 200 MG/1
200 CAPSULE ORAL ONCE
Status: COMPLETED | OUTPATIENT
Start: 2021-03-29 | End: 2021-03-29

## 2021-03-29 RX ORDER — DEXAMETHASONE SODIUM PHOSPHATE 4 MG/ML
INJECTION, SOLUTION INTRA-ARTICULAR; INTRALESIONAL; INTRAMUSCULAR; INTRAVENOUS; SOFT TISSUE PRN
Status: DISCONTINUED | OUTPATIENT
Start: 2021-03-29 | End: 2021-03-29 | Stop reason: SURG

## 2021-03-29 RX ORDER — HYDROMORPHONE HYDROCHLORIDE 1 MG/ML
0.2 INJECTION, SOLUTION INTRAMUSCULAR; INTRAVENOUS; SUBCUTANEOUS
Status: DISCONTINUED | OUTPATIENT
Start: 2021-03-29 | End: 2021-03-29 | Stop reason: HOSPADM

## 2021-03-29 RX ORDER — POTASSIUM CHLORIDE 20 MEQ/1
TABLET, EXTENDED RELEASE ORAL
Status: ACTIVE
Start: 2021-03-29 | End: 2021-03-29

## 2021-03-29 RX ORDER — PHENYLEPHRINE HYDROCHLORIDE 10 MG/ML
INJECTION, SOLUTION INTRAMUSCULAR; INTRAVENOUS; SUBCUTANEOUS PRN
Status: DISCONTINUED | OUTPATIENT
Start: 2021-03-29 | End: 2021-03-29 | Stop reason: SURG

## 2021-03-29 RX ORDER — SODIUM CHLORIDE, SODIUM LACTATE, POTASSIUM CHLORIDE, CALCIUM CHLORIDE 600; 310; 30; 20 MG/100ML; MG/100ML; MG/100ML; MG/100ML
INJECTION, SOLUTION INTRAVENOUS
Status: DISCONTINUED | OUTPATIENT
Start: 2021-03-29 | End: 2021-03-29 | Stop reason: SURG

## 2021-03-29 RX ORDER — MEPERIDINE HYDROCHLORIDE 25 MG/ML
12.5 INJECTION INTRAMUSCULAR; INTRAVENOUS; SUBCUTANEOUS
Status: DISCONTINUED | OUTPATIENT
Start: 2021-03-29 | End: 2021-03-29 | Stop reason: HOSPADM

## 2021-03-29 RX ORDER — LIDOCAINE HYDROCHLORIDE 20 MG/ML
INJECTION, SOLUTION EPIDURAL; INFILTRATION; INTRACAUDAL; PERINEURAL PRN
Status: DISCONTINUED | OUTPATIENT
Start: 2021-03-29 | End: 2021-03-29 | Stop reason: SURG

## 2021-03-29 RX ADMIN — SODIUM CHLORIDE, POTASSIUM CHLORIDE, SODIUM LACTATE AND CALCIUM CHLORIDE: 600; 310; 30; 20 INJECTION, SOLUTION INTRAVENOUS at 18:44

## 2021-03-29 RX ADMIN — DEXAMETHASONE SODIUM PHOSPHATE 8 MG: 4 INJECTION, SOLUTION INTRA-ARTICULAR; INTRALESIONAL; INTRAMUSCULAR; INTRAVENOUS; SOFT TISSUE at 14:39

## 2021-03-29 RX ADMIN — ROPIVACAINE HYDROCHLORIDE 30 ML: 5 INJECTION, SOLUTION EPIDURAL; INFILTRATION; PERINEURAL at 14:50

## 2021-03-29 RX ADMIN — PROPOFOL 150 MG: 10 INJECTION, EMULSION INTRAVENOUS at 14:39

## 2021-03-29 RX ADMIN — Medication 200 MCG: at 17:49

## 2021-03-29 RX ADMIN — SUGAMMADEX 200 MG: 100 INJECTION, SOLUTION INTRAVENOUS at 19:51

## 2021-03-29 RX ADMIN — ROCURONIUM BROMIDE 50 MG: 10 INJECTION, SOLUTION INTRAVENOUS at 14:39

## 2021-03-29 RX ADMIN — CELECOXIB 200 MG: 200 CAPSULE ORAL at 13:51

## 2021-03-29 RX ADMIN — SODIUM CHLORIDE, SODIUM GLUCONATE, SODIUM ACETATE, POTASSIUM CHLORIDE AND MAGNESIUM CHLORIDE: 526; 502; 368; 37; 30 INJECTION, SOLUTION INTRAVENOUS at 16:23

## 2021-03-29 RX ADMIN — Medication 200 MCG: at 17:51

## 2021-03-29 RX ADMIN — CEFAZOLIN 2 G: 330 INJECTION, POWDER, FOR SOLUTION INTRAMUSCULAR; INTRAVENOUS at 18:29

## 2021-03-29 RX ADMIN — INDIGO CARMINE 3 ML: 8 INJECTION, SOLUTION INTRAMUSCULAR; INTRAVENOUS at 18:32

## 2021-03-29 RX ADMIN — EPHEDRINE SULFATE 10 MG: 50 INJECTION, SOLUTION INTRAVENOUS at 17:46

## 2021-03-29 RX ADMIN — INDIGO CARMINE 2 ML: 8 INJECTION, SOLUTION INTRAMUSCULAR; INTRAVENOUS at 18:11

## 2021-03-29 RX ADMIN — PHENYLEPHRINE HYDROCHLORIDE 200 MCG: 10 INJECTION INTRAVENOUS at 16:54

## 2021-03-29 RX ADMIN — EPHEDRINE SULFATE 10 MG: 50 INJECTION, SOLUTION INTRAVENOUS at 17:51

## 2021-03-29 RX ADMIN — POTASSIUM CHLORIDE 40 MEQ: 1500 TABLET, EXTENDED RELEASE ORAL at 07:56

## 2021-03-29 RX ADMIN — CALCIUM CHLORIDE 1 G: 100 INJECTION INTRAVENOUS; INTRAVENTRICULAR at 18:02

## 2021-03-29 RX ADMIN — PHENYLEPHRINE HYDROCHLORIDE 200 MCG: 10 INJECTION INTRAVENOUS at 20:11

## 2021-03-29 RX ADMIN — EPHEDRINE SULFATE 10 MG: 50 INJECTION, SOLUTION INTRAVENOUS at 16:36

## 2021-03-29 RX ADMIN — ONDANSETRON 4 MG: 2 INJECTION INTRAMUSCULAR; INTRAVENOUS at 14:39

## 2021-03-29 RX ADMIN — PHENYLEPHRINE HYDROCHLORIDE 200 MCG: 10 INJECTION INTRAVENOUS at 20:08

## 2021-03-29 RX ADMIN — OXYCODONE 5 MG: 5 TABLET ORAL at 04:47

## 2021-03-29 RX ADMIN — PRAVASTATIN SODIUM 10 MG: 20 TABLET ORAL at 04:47

## 2021-03-29 RX ADMIN — LIDOCAINE HYDROCHLORIDE 100 MG: 20 INJECTION, SOLUTION EPIDURAL; INFILTRATION; INTRACAUDAL at 14:39

## 2021-03-29 RX ADMIN — SODIUM CHLORIDE, SODIUM GLUCONATE, SODIUM ACETATE, POTASSIUM CHLORIDE AND MAGNESIUM CHLORIDE: 526; 502; 368; 37; 30 INJECTION, SOLUTION INTRAVENOUS at 18:10

## 2021-03-29 RX ADMIN — MORPHINE SULFATE 2 MG: 4 INJECTION INTRAVENOUS at 01:24

## 2021-03-29 RX ADMIN — CEFAZOLIN 2 G: 330 INJECTION, POWDER, FOR SOLUTION INTRAMUSCULAR; INTRAVENOUS at 14:39

## 2021-03-29 RX ADMIN — SODIUM CHLORIDE, POTASSIUM CHLORIDE, SODIUM LACTATE AND CALCIUM CHLORIDE: 600; 310; 30; 20 INJECTION, SOLUTION INTRAVENOUS at 14:39

## 2021-03-29 RX ADMIN — PHENYLEPHRINE HYDROCHLORIDE 100 MCG: 10 INJECTION INTRAVENOUS at 16:36

## 2021-03-29 RX ADMIN — CEFTRIAXONE SODIUM 1 G: 1 INJECTION, POWDER, FOR SOLUTION INTRAMUSCULAR; INTRAVENOUS at 04:50

## 2021-03-29 RX ADMIN — HYDROMORPHONE HYDROCHLORIDE 2 MG: 2 INJECTION, SOLUTION INTRAMUSCULAR; INTRAVENOUS; SUBCUTANEOUS at 14:39

## 2021-03-29 RX ADMIN — PHENYLEPHRINE HYDROCHLORIDE 100 MCG: 10 INJECTION INTRAVENOUS at 16:23

## 2021-03-29 RX ADMIN — ACETAMINOPHEN 1000 MG: 500 TABLET, FILM COATED ORAL at 13:52

## 2021-03-29 RX ADMIN — ROCURONIUM BROMIDE 30 MG: 10 INJECTION, SOLUTION INTRAVENOUS at 16:09

## 2021-03-29 RX ADMIN — PHENYLEPHRINE HYDROCHLORIDE 200 MCG: 10 INJECTION INTRAVENOUS at 16:33

## 2021-03-29 ASSESSMENT — PAIN SCALES - GENERAL: PAIN_LEVEL: 0

## 2021-03-29 ASSESSMENT — ENCOUNTER SYMPTOMS
FLANK PAIN: 0
DIZZINESS: 0
SORE THROAT: 0
DEPRESSION: 0
HEADACHES: 0
MYALGIAS: 1
ABDOMINAL PAIN: 1
FALLS: 0
NECK PAIN: 0
NAUSEA: 0
FEVER: 0
DIARRHEA: 0
EYE PAIN: 0
WEAKNESS: 0
VOMITING: 0
SINUS PAIN: 0
SHORTNESS OF BREATH: 0
CONSTIPATION: 0
CHILLS: 0
NERVOUS/ANXIOUS: 0
BLOOD IN STOOL: 0
BRUISES/BLEEDS EASILY: 0
BACK PAIN: 0

## 2021-03-29 ASSESSMENT — PAIN DESCRIPTION - PAIN TYPE
TYPE: ACUTE PAIN
TYPE: ACUTE PAIN
TYPE: ACUTE PAIN;CHRONIC PAIN

## 2021-03-29 NOTE — ANESTHESIA PROCEDURE NOTES
Arterial Line  Performed by: Yony Delarosa M.D.  Authorized by: Yony Delarosa M.D.     Start Time:  3/29/2021 2:44 PM  End Time:  3/29/2021 2:45 PM  Localization: ultrasound guidance and surface landmarks    Patient Location:  OR  Indication: continuous blood pressure monitoring        Catheter Size:  20 G  Seldinger Technique?: Yes    Laterality:  Left  Site:  Radial artery  Line Secured:  Antimicrobial disc, tape and transparent dressing  Events: patient tolerated procedure well with no complications

## 2021-03-29 NOTE — ANESTHESIA PROCEDURE NOTES
Airway    Date/Time: 3/29/2021 2:43 PM  Performed by: Yony Delarosa M.D.  Authorized by: Yony Delarosa M.D.     Location:  OR  Urgency:  Elective  Indications for Airway Management:  Anesthesia      Spontaneous Ventilation: absent    Sedation Level:  Deep  Preoxygenated: Yes    Patient Position:  Sniffing  Mask Difficulty Assessment:  0 - not attempted  Final Airway Type:  Endotracheal airway  Final Endotracheal Airway:  ETT  Cuffed: Yes    Technique Used for Successful ETT Placement:  Direct laryngoscopy    Insertion Site:  Oral  Blade Type:  Winters  Laryngoscope Blade/Videolaryngoscope Blade Size:  2  ETT Size (mm):  6.5  Measured from:  Gums  ETT to Gums (cm):  22  Placement Verified by: auscultation and capnometry    Cormack-Lehane Classification:  Grade I - full view of glottis  Number of Attempts at Approach:  1

## 2021-03-29 NOTE — CARE PLAN
Problem: Pain Management  Goal: Pain level will decrease to patient's comfort goal  Outcome: PROGRESSING AS EXPECTED  Intervention: Follow pain managment plan developed in collaboration with patient and Interdisciplinary Team  Note: Pt experiencing 0/10 pain when asked this evening. Will continue to reassess pain with each interaction.      Problem: Infection  Goal: Will remain free from infection  Outcome: PROGRESSING AS EXPECTED  Intervention: Assess signs and symptoms of infection  Note: Pt not experiencing signs or symptoms of infection at this time.

## 2021-03-29 NOTE — ANESTHESIA PREPROCEDURE EVALUATION
Uterine cancer with extensive metastases.     Denies angina, dyspnea, GERD.     NPO.     Relevant Problems   CARDIAC   (+) Essential hypertension         (+) Stage 3 chronic kidney disease       Physical Exam    Airway   Mallampati: II  TM distance: >3 FB  Neck ROM: full       Cardiovascular - normal exam  Rhythm: regular  Rate: normal  (-) murmur     Dental - normal exam  (+) upper dentures, lower dentures           Pulmonary - normal exam  Breath sounds clear to auscultation     Abdominal    Neurological - normal exam                 Anesthesia Plan    ASA 3- EMERGENT   ASA physical status 3 criteria: other (comment)ASA physical status emergent criteria: compromised vital organ, limb or tissue    Plan - general and peripheral nerve block     Peripheral nerve block will be post-op pain control  Airway plan will be ETT    (Arterial line, TAP blocks)      Induction: intravenous    Postoperative Plan: Postoperative administration of opioids is intended.    Pertinent diagnostic labs and testing reviewed    Informed Consent:    Anesthetic plan and risks discussed with patient.    Use of blood products discussed with: patient whom consented to blood products.

## 2021-03-29 NOTE — PROGRESS NOTES
[facility] Medicine Daily Progress Note    Date of Service  3/29/2021    Chief Complaint  71 y.o. female admitted 3/25/2021 with symptomatic anemia due to AUB. Workup revealed new diagnosis of Stage IV uterine cancer.    Hospital Course  Ms. Desai is a pleasant 70 y/o  female with a PMHx of HTN, vaginal bleeding, and CKD who presented to the ED on 3/25/2021 with complaints of chest and back pain, progressive weakness, and GONZALEZ. She has also had 1 year of progressive weight loss and vaginal bleeding. In the ED she was found to be anemic with a hgb of 7.1 and was transfused 1 unit of PRBC. Her EKG showed no ischemia or infarct, though her d-dimer was elevated so a CT scan was done and was negative for aortic aneurysm or dissection but did have partial visualization of a markedly enlarged heterogeneous uterus with hypervascularity concerning for malignancy versus fibroids. A pelvic ultrasound was then done and showed most of the same findings so an MRI of the pelvis was then performed and revealed stage IV endometrial cancer with an endometrial mass measuring 13 cm invading through the anterior uterine wall and uterine fundus, metastatic parametrial, external iliac and para-aortic lymphadenopathy, and multiple pelvic osseous metastases.      Interval Problem Update  ADA ON  Transfused 1 u PRBC pre-operative last night with Hgb 10.4 this morning.  She completed her entire 4 L bowel prep and had clear BM this morning.  Her abdominal pain is significantly improved.  Ongoing dysuria without hematuria.  She walked yesterday and her thighs are aching this morning. She denies weakness, falls.  She denies N/V, F/C, bleeding, dysphoria, dyspnea, dysphoria.    Discussed POC with GYNONC ARTURO Urbina and requested they update her SO Star after the surgery.    Consultants/Specialty  Gynecology Oncology - Stage IV endometrial CA    Code Status  Full Code    Disposition  Pending recovery from Exploratory Laparotomy with  TAHBSO 3/29    Review of Systems  Review of Systems   Constitutional: Negative for chills and fever.   HENT: Negative for ear pain, nosebleeds, sinus pain and sore throat.    Eyes: Negative for pain.   Respiratory: Negative for shortness of breath.    Cardiovascular: Negative for chest pain and leg swelling.   Gastrointestinal: Positive for abdominal pain. Negative for blood in stool, constipation, diarrhea, melena, nausea and vomiting.   Genitourinary: Positive for dysuria and hematuria. Negative for flank pain.   Musculoskeletal: Positive for myalgias. Negative for back pain, falls, joint pain and neck pain.   Skin: Negative for rash.   Neurological: Negative for dizziness, weakness and headaches.   Endo/Heme/Allergies: Does not bruise/bleed easily.   Psychiatric/Behavioral: Negative for depression. The patient is not nervous/anxious.         Physical Exam  Temp:  [36.5 °C (97.7 °F)-37.3 °C (99.2 °F)] 36.5 °C (97.7 °F)  Pulse:  [89-98] 98  Resp:  [16-24] 18  BP: (131-165)/(47-85) 150/85  SpO2:  [92 %-98 %] 98 %    Physical Exam  Vitals and nursing note reviewed.   HENT:      Head:      Comments: Bitemporal wasting     Nose: Nose normal.      Mouth/Throat:      Mouth: Mucous membranes are moist.      Pharynx: Oropharynx is clear.   Eyes:      General: No scleral icterus.     Conjunctiva/sclera: Conjunctivae normal.   Cardiovascular:      Rate and Rhythm: Normal rate and regular rhythm.      Pulses: Normal pulses.      Heart sounds: Normal heart sounds. No murmur. No friction rub. No gallop.    Pulmonary:      Effort: Pulmonary effort is normal. No respiratory distress.      Breath sounds: Normal breath sounds. No wheezing, rhonchi or rales.   Abdominal:      General: Abdomen is protuberant. Bowel sounds are decreased.      Palpations: Abdomen is soft. There is mass.      Tenderness: There is abdominal tenderness (Moderate BLQ). There is no guarding or rebound.   Genitourinary:     Comments: No  mona  Musculoskeletal:      Cervical back: Neck supple.      Right lower leg: Edema present.      Left lower leg: Edema present.      Comments: Non-pitting edema to thighs   Skin:     General: Skin is warm and dry.   Neurological:      Mental Status: She is alert.   Psychiatric:         Attention and Perception: Attention and perception normal.         Mood and Affect: Mood and affect normal.         Speech: Speech normal.         Behavior: Behavior is cooperative.         Thought Content: Thought content normal.         Cognition and Memory: Cognition and memory normal.         Judgment: Judgment normal.         Fluids    Intake/Output Summary (Last 24 hours) at 3/29/2021 1417  Last data filed at 3/29/2021 0800  Gross per 24 hour   Intake 448 ml   Output --   Net 448 ml       Laboratory  Recent Labs     03/27/21 0330 03/27/21 0330 03/27/21 0931 03/28/21 0008 03/29/21  0006   WBC 10.3  --   --  12.3* 14.4*   RBC 4.35  --   --  4.45 4.96   HEMOGLOBIN 8.3*   < > 9.0* 8.5* 10.4*   HEMATOCRIT 28.5*  --   --  29.6* 33.6*   MCV 65.5*  --   --  66.5* 67.7*   MCH 19.1*  --   --  19.1* 21.0*   MCHC 29.1*  --   --  28.7* 31.0*   RDW 50.3*  --   --  52.2* 59.4*   PLATELETCT 332  --   --  376 352   MPV 9.7  --   --  9.4 8.8*    < > = values in this interval not displayed.     Recent Labs     03/27/21 0330 03/28/21 0008 03/29/21 0006   SODIUM 138 134* 138   POTASSIUM 3.9 3.8 3.4*   CHLORIDE 102 98 101   CO2 25 23 27   GLUCOSE 111* 133* 106*   BUN 13 11 5*   CREATININE 1.02 0.76 0.58   CALCIUM 8.8 9.0 9.0     Recent Labs     03/27/21 0330   APTT 30.1   INR 1.07               Imaging  US-EXTREMITY VENOUS LOWER BILAT   Final Result      MR-PELVIS-WITH & W/O AND SEQUENCES   Final Result         1. Stage IV endometrial cancer. The endometrial mass itself measures 13 cm, invading through the anterior uterine wall and uterine fundus.   2. Metastatic parametrial, external iliac and para-aortic lymphadenopathy.   3. Multiple  pelvic osseous metastases.   4. Several uterine fibroids.            US-PELVIC TRANSABDOMINAL ONLY   Final Result      Enlarged, heterogeneous uterus could indicate underlying fibroids though malignancy cannot be excluded.      Endometrial stripe not visualized.      CT-CTA COMPLETE THORACOABDOMINAL AORTA   Final Result      No evidence of aortic aneurysm or dissection.      Partial visualization of a markedly enlarged heterogeneous uterus with hypervascularity concerning for malignancy versus fibroids.      Diverticulosis without evidence of diverticulitis.      Bilateral hydronephrosis and hydroureter likely related to enlarged uterus.      Nonobstructive left renal stone.      Bilateral pulmonary nodules measuring up to 5 mm.      Nodule recommendations:   Low Risk: No routine follow-up      High Risk: Optional CT at 12 months      Comments: Use most suspicious nodule as guide to management. Follow-up intervals may vary according to size and risk.      Low Risk - Minimal or absent history of smoking and of other known risk factors.      High Risk - History of smoking or of other known risk factors.      Note: These recommendations do not apply to lung cancer screening, patients with immunosuppression, or patients with known primary cancer.      Fleischner Society 2017 Guidelines for Management of Incidentally Detected Pulmonary Nodules in Adults      DX-CHEST-PORTABLE (1 VIEW)   Final Result      No acute cardiac or pulmonary abnormalities are identified.           Assessment/Plan  * Metastatic endometrial cancer (HCC)- (present on admission)  Assessment & Plan  Resented with symptomatic SARAH from uterine bleeding  CT demonstrated large mass also seen on pelvic US  MRI showed stage IV endometrial CA w/ an endometrial mass measuring 13 cm invading through the anterior uterine wall and uterine fundus, metastatic parametrial, external iliac, and para-aortic lymphadenopathy, and multiple pelvic osseous metastases.    GYN-ONC consulted, Dr. Quinteros recommends Ex-Lap with MACKENZIE/BSO and possible diverting colostomy 3/29  Clear Liquid diet, bowel prep, and NPO after midnight 3/28  Baseline  of 54.   Oxycodone and IV morphine PRN pain control    Constipation- (present on admission)  Assessment & Plan  Resolved after bowel prep pre-operative 3/29  Low transit due to pelvic mass and superimposed opiate-induced constipation    Cystitis  Assessment & Plan  Unable to clinically differentiate infective urethritis/cystitis from extension of endometrial cancer  Symptomatic including dysuria, urgency, and frequency  UA +pyuria, negative nitrites  UCx negative  Empiric ceftriaxone x5 days in interim due to planned gynecologic procedure 3/29    Iron deficiency anemia due to chronic blood loss- (present on admission)  Assessment & Plan  Improving to Hgb 10.6 after 1 unit PRBC pre-op 3/28  Due to chronic blood loss anemia from endometrial cancer.  S/p transfusion of 1u PRBC on arrival to ED for hgb 7.1, symptomatic.   Ferritin 13 and FeSat 8% c/w SARAH, received IV Fe Dextran  Repeat AM CBC   PRBC type & cross chintan-operative per GYN-ONC    Hypokalemia  Assessment & Plan  K 3.4  Kdur 40 x1  Mg ordered as add-on, pending  Repeat AM BMP/Mg    Hyponatremia- (present on admission)  Assessment & Plan  Resolved  Repeat AM BMP    Peripheral edema- (present on admission)  Assessment & Plan  Non-pitting, likely due to lymphatic obstruction from pelvic mass  BLE US negative for DVT    Stage 3 chronic kidney disease- (present on admission)  Assessment & Plan  Ruled out.   Chart diagnosis, not supported by EGFR >60 today    Hyperlipidemia- (present on admission)  Assessment & Plan  Continue pravastatin    Prediabetes- (present on admission)  Assessment & Plan  A1c 6.1 in January 2020  Repeat A1c deferred, unreliable (under-estimates average BG) in setting of SARAH and post-transfusion due to increased turnover and foreign RBCs  POCT and SSI deferred unless  sustained BG >200    Essential hypertension- (present on admission)  Assessment & Plan  Continue amlodipine, due to long half-life discontinuing pre-anaesthesia not expected to make a difference       VTE prophylaxis: SCDs pre-procedure

## 2021-03-29 NOTE — PROGRESS NOTES
A&Ox4; sometimes requires reinforcement of treatment education. POC discussed and all questions answered. Denies pain or nausea today. NPO for procedure today. Up with SBA and tolerates well. RPIV CDI and flushing well.   Call light and belongings within reach, all fall precautions and hourly rounding in place.

## 2021-03-29 NOTE — ANESTHESIA PROCEDURE NOTES
Peripheral IV    Date/Time: 3/29/2021 3:02 PM  Performed by: Yony Delarosa M.D.  Authorized by: Yony Delarosa M.D.     Size:  20 G  Laterality:  Left  Technique:  Direct puncture  Attempts:  1  Difficult IV necessitating physician skill: IV access difficult    Ultrasound Guidance: Yes

## 2021-03-29 NOTE — DISCHARGE PLANNING
Anticipated Discharge Disposition: Pending recovery from Exploratory Laparotomy    Action: Student NEVIN placed Cancer Nurse Navigator order per protocol for pt's new Stage IV Uterine Cancer diagnosis.    Discussed pt in IDT rounds. Surgery is scheduled for 1400.     Barriers to Discharge: Medical clearance, POC/GOC    Plan: SW/CM will complete CTT assessment when appropriate. SW/CM f/u with medical team.      1150: Met w/ pt at bedside to complete CTT assessment. No SW/CM needs reported at this time. NEVIN informed pt of Cancer Nurse Navigator, who will likely be Desiree.         Care Transition Team Assessment    In the case of an emergency, pt's contact is Star Ulloa (significant other) [phone].    This Student SW spoke with Pt at bedside and obtained the information used in this assessment. Pt verified accuracy of facesheet.    Pt lives with significant other, Star, in a 1[address]ram[address] apartment. Pt uses [facility] in [place]. Prior to current hospitalization, pt was independent with ADLS/IADLS. Pt reports using a walker. Pt is unable to drive. Star can drive pt to appts. Pt has prescription coverage through primary insurance [employer]. Pt denies history of substance abuse and mental health. Pt support system is [facility]. Pt does not have AD. AD booklet given. No other SW/CM needs reported at this time.    Upon D/C, pt states that Star, [phone], will provide transport, if applicable.    Information Source  Orientation : Oriented x 4  Information Given By: Patient  Informant's Name: Graciela Desai  Who is responsible for making decisions for patient? : Patient    Readmission Evaluation  Is this a readmission?: No    Elopement Risk  Legal Hold: No  Ambulatory or Self Mobile in Wheelchair: Yes  Disoriented: No  Psychiatric Symptoms: None  History of Wandering: No  Elopement this Admit: No  Vocalizing Wanting to Leave: No  Displays Behaviors, Body Language Wanting to Leave: No-Not at Risk for  Elopement  Elopement Risk: Not at Risk for Elopement    Interdisciplinary Discharge Planning  Primary Care Physician: Shan Tuttle(last seen Jan 2020)  Lives with - Patient's Self Care Capacity: Significant Other  Patient or legal guardian wants to designate a caregiver: No  Support Systems: Spouse / Significant Other  Housing / Facility: 1 Story Apartment / Condo  Durable Medical Equipment: Walker    Discharge Preparedness  What is your plan after discharge?: Uncertain - pending medical team collaboration  What are your discharge supports?: Partner  Prior Functional Level: Ambulatory, Independent with Activities of Daily Living, Uses Walker  Difficulity with ADLs: None  Difficulity with IADLs: Driving  Difficulity with IADL Comments: SO Star drives    Functional Assesment  Prior Functional Level: Ambulatory, Independent with Activities of Daily Living, Uses Walker    Finances  Financial Barriers to Discharge: No  Prescription Coverage: Yes    Vision / Hearing Impairment  Vision Impairment : No  Hearing Impairment : No         Advance Directive  Advance Directive?: None  Advance Directive offered?: AD Booklet given    Domestic Abuse  Have you ever been the victim of abuse or violence?: No  Physical Abuse or Sexual Abuse: No  Verbal Abuse or Emotional Abuse: No  Possible Abuse/Neglect Reported to:: Not Applicable    Psychological Assessment  History of Substance Abuse: None  History of Psychiatric Problems: No  Non-compliant with Treatment: No  Newly Diagnosed Illness: Yes    Discharge Risks or Barriers  Discharge risks or barriers?: No    Anticipated Discharge Information  Discharge Disposition: Still a Patient (30)  Discharge Address: [address] [place] [id number]  Discharge Contact Phone Number: [phone]      **Note reviewed and approved by SHIVAM Viera

## 2021-03-29 NOTE — ANESTHESIA PROCEDURE NOTES
Peripheral Block    Date/Time: 3/29/2021 2:50 PM  Performed by: Yony Delarosa M.D.  Authorized by: Yony Delarosa M.D.     Patient Location:  OR  Start Time:  3/29/2021 2:50 PM  End Time:  3/29/2021 2:52 PM  Reason for Block: at surgeon's request and post-op pain management ONLY    patient identified, IV checked, site marked, risks and benefits discussed, surgical consent, monitors and equipment checked, pre-op evaluation and timeout performed    Patient Position:  Supine  Prep: ChloraPrep    Monitoring:  Heart rate, continuous pulse ox and cardiac monitor  Block Region:  Trunk  Trunk - Block Type:  Abdominal plane block - TAP block    Laterality:  Bilateral  Procedures: ultrasound guided  Image captured, interpreted and electronically stored.  Local Infiltration:  Lidocaine  Strength:  1 %  Dose:  3 ml  Block Type:  Single-shot  Needle Length:  50mm  Needle Gauge:  22 G  Needle Localization:  Ultrasound guidance  Injection Assessment:  Negative aspiration for heme, no paresthesia on injection, incremental injection and local visualized surrounding nerve on ultrasound  Evidence of intravascular injection: No     US Guided Transversus Abdominis Plane (TAP) Block   US probe placed at the anterior axillary line between the costal margin and iliac crest in an axial plane. External Oblique, Internal Oblique (IO) and Transversis Abdominis (TA) muscles identified.  Needle inserted anteromedial to probe in an in plane approach and advanced under direct visualization to TAP between IO and TA muscled.  After negative aspiration LA injected with ease and visualized spreading in TAP plane.

## 2021-03-29 NOTE — PROGRESS NOTES
Surgical Progress Note    Author: Shonda Quinteros M.D. Date & Time created: 3/29/2021   2:31 PM     Interval Events:  No acute events or changes in symptoms. Plan for OR today as outlined. Patient voices understanding and desires to proceed.     Hemodynamics:  Temp (24hrs), Av.9 °C (98.5 °F), Min:36.5 °C (97.7 °F), Max:37.3 °C (99.2 °F)  Temperature: 36.5 °C (97.7 °F)  Pulse  Av.9  Min: 73  Max: 99   Blood Pressure : 150/85     Respiratory:    Respiration: 18, Pulse Oximetry: 98 %        RUL Breath Sounds: Clear, RML Breath Sounds: Clear, RLL Breath Sounds: Clear, KAREN Breath Sounds: Clear, LLL Breath Sounds: Clear  Neuro:  GCS       Fluids:    Intake/Output Summary (Last 24 hours) at 3/29/2021 1431  Last data filed at 3/29/2021 0800  Gross per 24 hour   Intake 448 ml   Output --   Net 448 ml        Current Diet Order   Procedures   • Diet NPO     Physical Exam   Constitutional: She is oriented to person, place, and time. She appears well-developed.   HENT:   Head: Normocephalic.   Cardiovascular: Normal rate.   Pulmonary/Chest: Effort normal.   Abdominal: Soft. There is abdominal tenderness. There is no rebound and no guarding.   Musculoskeletal:         General: No tenderness.   Neurological: She is alert and oriented to person, place, and time.     Labs:  Recent Results (from the past 24 hour(s))   CBC WITHOUT DIFFERENTIAL    Collection Time: 21 12:06 AM   Result Value Ref Range    WBC 14.4 (H) 4.8 - 10.8 K/uL    RBC 4.96 4.20 - 5.40 M/uL    Hemoglobin 10.4 (L) 12.0 - 16.0 g/dL    Hematocrit 33.6 (L) 37.0 - 47.0 %    MCV 67.7 (L) 81.4 - 97.8 fL    MCH 21.0 (L) 27.0 - 33.0 pg    MCHC 31.0 (L) 33.6 - 35.0 g/dL    RDW 59.4 (H) 35.9 - 50.0 fL    Platelet Count 352 164 - 446 K/uL    MPV 8.8 (L) 9.0 - 12.9 fL   Basic Metabolic Panel    Collection Time: 21 12:06 AM   Result Value Ref Range    Sodium 138 135 - 145 mmol/L    Potassium 3.4 (L) 3.6 - 5.5 mmol/L    Chloride 101 96 - 112 mmol/L    Co2 27  20 - 33 mmol/L    Glucose 106 (H) 65 - 99 mg/dL    Bun 5 (L) 8 - 22 mg/dL    Creatinine 0.58 0.50 - 1.40 mg/dL    Calcium 9.0 8.5 - 10.5 mg/dL    Anion Gap 10.0 7.0 - 16.0   HEMOGLOBIN A1C    Collection Time: 03/29/21 12:06 AM   Result Value Ref Range    Glycohemoglobin 5.0 4.0 - 5.6 %    Est Avg Glucose 97 mg/dL   ESTIMATED GFR    Collection Time: 03/29/21 12:06 AM   Result Value Ref Range    GFR If African American >60 >60 mL/min/1.73 m 2    GFR If Non African American >60 >60 mL/min/1.73 m 2     Medical Decision Making, by Problem:  Active Hospital Problems    Diagnosis    • Metastatic endometrial cancer (HCC) [C54.1]      Priority: High   • Cystitis [N30.90]      Priority: Medium   • Constipation [K59.00]      Priority: Medium   • Iron deficiency anemia due to chronic blood loss [D50.0]      Priority: Medium   • Hypokalemia [E87.6]      Priority: Low   • Hyponatremia [E87.1]      Priority: Low   • Peripheral edema [R60.9]      Priority: Low   • Stage 3 chronic kidney disease [N18.30]      Priority: Low   • Hyperlipidemia [E78.5]      Priority: Low   • Essential hypertension [I10]      Priority: Low   • Prediabetes [R73.03]      Priority: Low     Plan:  This is a 71 y.o. female admitted with chest pain and shortness of breath, found to have anemia secondary to abnormal uterine bleeding and incidentally noted uterine mass:     Plan:   1.  Endometrial mass:  History of complex hyperplasia, never treated. Differential diagnosis would include uterine carcinoma such as endometrioid, high-grade serous, clear cell, carcinosarcoma, or leiomyosarcoma.  We discussed that secondary to her ongoing bleeding and pain, upfront surgical management is recommended for palliation of her symptoms.  I have recommended that she undergo exploratory laparotomy with total abdominal hysterectomy and bilateral salpingo-oophorectomy, possible pelvic and/or periaortic lymphadenectomy, tumor debulking, omentectomy, and large and/or small  bowel resection depending upon intraoperative findings and frozen section.  We discussed the small risk of ostomy if a bowel resection is indicated. Patient is aware that in the setting of metastatic disease, surgery alone is not curative. Plan to proceed to OR today.  2.  Abnormal uterine bleeding: Secondary to above, surgical management planned  3.  Anemia: Secondary to acute on chronic blood loss, continue to monitor hemoglobin and transfuse as indicated, goal hemoglobin 9 prior to surgery > s/p an additional unit PRBC yesterday w/ Hgb 10

## 2021-03-29 NOTE — CARE PLAN
Problem: Safety  Goal: Will remain free from falls  Outcome: PROGRESSING AS EXPECTED  Intervention: Implement fall precautions  Flowsheets  Taken 3/28/2021 1713 by Mariana Oneill R.N.  Bed Alarm: Yes - Alarm On  Taken 3/28/2021 0915 by Mariana Oneill R.N.  Environmental Precautions:   Treaded Slipper Socks on Patient   Personal Belongings, Wastebasket, Call Bell etc. in Easy Reach   Transferred to Stronger Side   Report Given to Other Health Care Providers Regarding Fall Risk   Bed in Low Position   Communication Sign for Patients & Families   Mobility Assessed & Appropriate Sign Placed  Taken 3/27/2021 2147 by Olga Hanson R.N.  Chair/Bed Strip Alarm: Yes - Alarm On  Note: Patient calls appropriately; hourly rounding and fall precautions in place.      Problem: Pain Management  Goal: Pain level will decrease to patient's comfort goal  Outcome: PROGRESSING AS EXPECTED  Flowsheets  Taken 3/28/2021 1713  Comfort Goal: 5  Taken 3/28/2021 1306  Pain Rating Scale (NPRS): 5  Note: Met patient's comfort goal; use of medical and non-pharmacologic interventions.

## 2021-03-30 ENCOUNTER — APPOINTMENT (OUTPATIENT)
Dept: RADIOLOGY | Facility: MEDICAL CENTER | Age: 71
DRG: 739 | End: 2021-03-30
Attending: PSYCHIATRY & NEUROLOGY
Payer: MEDICARE

## 2021-03-30 PROBLEM — N18.30 STAGE 3 CHRONIC KIDNEY DISEASE: Status: RESOLVED | Noted: 2018-11-26 | Resolved: 2021-03-30

## 2021-03-30 PROBLEM — N30.90 CYSTITIS: Status: RESOLVED | Noted: 2021-03-27 | Resolved: 2021-03-30

## 2021-03-30 PROBLEM — R41.0 CONFUSION: Status: ACTIVE | Noted: 2021-03-30

## 2021-03-30 LAB
ACTION RANGE TRIGGERED IACRT: NO
ACTION RANGE TRIGGERED IACRT: YES
ALBUMIN SERPL BCP-MCNC: 1.9 G/DL (ref 3.2–4.9)
ALBUMIN/GLOB SERPL: 0.6 G/DL
ALP SERPL-CCNC: 86 U/L (ref 30–99)
ALT SERPL-CCNC: 584 U/L (ref 2–50)
ANION GAP SERPL CALC-SCNC: 12 MMOL/L (ref 7–16)
AST SERPL-CCNC: 753 U/L (ref 12–45)
BASE EXCESS BLDA CALC-SCNC: -1 MMOL/L (ref -4–3)
BASE EXCESS BLDA CALC-SCNC: -5 MMOL/L (ref -4–3)
BILIRUB SERPL-MCNC: 0.5 MG/DL (ref 0.1–1.5)
BODY TEMPERATURE: ABNORMAL DEGREES
BODY TEMPERATURE: ABNORMAL DEGREES
BUN SERPL-MCNC: 11 MG/DL (ref 8–22)
CA-I BLD ISE-SCNC: 1.07 MMOL/L (ref 1.1–1.3)
CA-I BLD ISE-SCNC: 1.21 MMOL/L (ref 1.1–1.3)
CALCIUM SERPL-MCNC: 8.6 MG/DL (ref 8.5–10.5)
CHLORIDE SERPL-SCNC: 104 MMOL/L (ref 96–112)
CO2 BLDA-SCNC: 24 MMOL/L (ref 20–33)
CO2 BLDA-SCNC: 26 MMOL/L (ref 20–33)
CO2 SERPL-SCNC: 22 MMOL/L (ref 20–33)
CREAT SERPL-MCNC: 1.17 MG/DL (ref 0.5–1.4)
ERYTHROCYTE [DISTWIDTH] IN BLOOD BY AUTOMATED COUNT: 73.8 FL (ref 35.9–50)
GLOBULIN SER CALC-MCNC: 3.3 G/DL (ref 1.9–3.5)
GLUCOSE SERPL-MCNC: 253 MG/DL (ref 65–99)
HCO3 BLDA-SCNC: 22.2 MMOL/L (ref 17–25)
HCO3 BLDA-SCNC: 24.5 MMOL/L (ref 17–25)
HCT VFR BLD AUTO: 34.6 % (ref 37–47)
HCT VFR BLD CALC: 24 % (ref 37–47)
HCT VFR BLD CALC: 26 % (ref 37–47)
HGB BLD-MCNC: 11.1 G/DL (ref 12–16)
HGB BLD-MCNC: 8.2 G/DL (ref 12–16)
HGB BLD-MCNC: 8.8 G/DL (ref 12–16)
HOROWITZ INDEX BLDA+IHG-RTO: ABNORMAL MM[HG]
INST. QUALIFIED PATIENT IIQPT: YES
INST. QUALIFIED PATIENT IIQPT: YES
MAGNESIUM SERPL-MCNC: 1.7 MG/DL (ref 1.5–2.5)
MCH RBC QN AUTO: 25.2 PG (ref 27–33)
MCHC RBC AUTO-ENTMCNC: 32.1 G/DL (ref 33.6–35)
MCV RBC AUTO: 78.6 FL (ref 81.4–97.8)
O2/TOTAL GAS SETTING VFR VENT: 0.9 %
PATHOLOGY CONSULT NOTE: NORMAL
PCO2 BLDA: 44.9 MMHG (ref 26–37)
PCO2 BLDA: 52.6 MMHG (ref 26–37)
PCO2 TEMP ADJ BLDA: 44.9 MMHG (ref 26–37)
PH BLDA: 7.23 [PH] (ref 7.4–7.5)
PH BLDA: 7.34 [PH] (ref 7.4–7.5)
PH TEMP ADJ BLDA: 7.34 [PH] (ref 7.4–7.5)
PLATELET # BLD AUTO: 267 K/UL (ref 164–446)
PMV BLD AUTO: 9.4 FL (ref 9–12.9)
PO2 BLDA: 231 MMHG (ref 64–87)
PO2 BLDA: 318 MMHG (ref 64–87)
PO2 TEMP ADJ BLDA: 231 MMHG (ref 64–87)
POTASSIUM BLD-SCNC: 4.3 MMOL/L (ref 3.6–5.5)
POTASSIUM BLD-SCNC: 6 MMOL/L (ref 3.6–5.5)
POTASSIUM SERPL-SCNC: 4.8 MMOL/L (ref 3.6–5.5)
PROT SERPL-MCNC: 5.2 G/DL (ref 6–8.2)
RBC # BLD AUTO: 4.4 M/UL (ref 4.2–5.4)
SAO2 % BLDA: 100 % (ref 93–99)
SAO2 % BLDA: 100 % (ref 93–99)
SODIUM BLD-SCNC: 137 MMOL/L (ref 135–145)
SODIUM BLD-SCNC: 140 MMOL/L (ref 135–145)
SODIUM SERPL-SCNC: 138 MMOL/L (ref 135–145)
SPECIMEN DRAWN FROM PATIENT: ABNORMAL
SPECIMEN DRAWN FROM PATIENT: ABNORMAL
WBC # BLD AUTO: 22.4 K/UL (ref 4.8–10.8)

## 2021-03-30 PROCEDURE — 700117 HCHG RX CONTRAST REV CODE 255: Performed by: PSYCHIATRY & NEUROLOGY

## 2021-03-30 PROCEDURE — 99221 1ST HOSP IP/OBS SF/LOW 40: CPT | Performed by: PSYCHIATRY & NEUROLOGY

## 2021-03-30 PROCEDURE — 80053 COMPREHEN METABOLIC PANEL: CPT

## 2021-03-30 PROCEDURE — 700111 HCHG RX REV CODE 636 W/ 250 OVERRIDE (IP): Performed by: OBSTETRICS & GYNECOLOGY

## 2021-03-30 PROCEDURE — 700102 HCHG RX REV CODE 250 W/ 637 OVERRIDE(OP): Performed by: NURSE PRACTITIONER

## 2021-03-30 PROCEDURE — 700105 HCHG RX REV CODE 258: Performed by: ANESTHESIOLOGY

## 2021-03-30 PROCEDURE — 770020 HCHG ROOM/CARE - TELE (206)

## 2021-03-30 PROCEDURE — 70496 CT ANGIOGRAPHY HEAD: CPT | Mod: MG

## 2021-03-30 PROCEDURE — 700102 HCHG RX REV CODE 250 W/ 637 OVERRIDE(OP): Performed by: STUDENT IN AN ORGANIZED HEALTH CARE EDUCATION/TRAINING PROGRAM

## 2021-03-30 PROCEDURE — 700101 HCHG RX REV CODE 250: Performed by: OBSTETRICS & GYNECOLOGY

## 2021-03-30 PROCEDURE — A9270 NON-COVERED ITEM OR SERVICE: HCPCS | Performed by: OBSTETRICS & GYNECOLOGY

## 2021-03-30 PROCEDURE — A9270 NON-COVERED ITEM OR SERVICE: HCPCS | Performed by: STUDENT IN AN ORGANIZED HEALTH CARE EDUCATION/TRAINING PROGRAM

## 2021-03-30 PROCEDURE — A9270 NON-COVERED ITEM OR SERVICE: HCPCS | Performed by: NURSE PRACTITIONER

## 2021-03-30 PROCEDURE — 36415 COLL VENOUS BLD VENIPUNCTURE: CPT

## 2021-03-30 PROCEDURE — 83735 ASSAY OF MAGNESIUM: CPT

## 2021-03-30 PROCEDURE — 0042T CT-CEREBRAL PERFUSION ANALYSIS: CPT

## 2021-03-30 PROCEDURE — 700105 HCHG RX REV CODE 258: Performed by: OBSTETRICS & GYNECOLOGY

## 2021-03-30 PROCEDURE — 700102 HCHG RX REV CODE 250 W/ 637 OVERRIDE(OP): Performed by: OBSTETRICS & GYNECOLOGY

## 2021-03-30 PROCEDURE — 85027 COMPLETE CBC AUTOMATED: CPT

## 2021-03-30 PROCEDURE — 770006 HCHG ROOM/CARE - MED/SURG/GYN SEMI*

## 2021-03-30 PROCEDURE — 99233 SBSQ HOSP IP/OBS HIGH 50: CPT | Performed by: INTERNAL MEDICINE

## 2021-03-30 PROCEDURE — 70498 CT ANGIOGRAPHY NECK: CPT | Mod: MG

## 2021-03-30 RX ORDER — KETOROLAC TROMETHAMINE 30 MG/ML
15 INJECTION, SOLUTION INTRAMUSCULAR; INTRAVENOUS EVERY 6 HOURS PRN
Status: DISCONTINUED | OUTPATIENT
Start: 2021-03-30 | End: 2021-03-31

## 2021-03-30 RX ORDER — ONDANSETRON 2 MG/ML
4 INJECTION INTRAMUSCULAR; INTRAVENOUS EVERY 6 HOURS PRN
Status: DISCONTINUED | OUTPATIENT
Start: 2021-03-30 | End: 2021-04-09 | Stop reason: HOSPADM

## 2021-03-30 RX ORDER — PRAVASTATIN SODIUM 20 MG
40 TABLET ORAL DAILY
Status: DISCONTINUED | OUTPATIENT
Start: 2021-03-31 | End: 2021-03-31

## 2021-03-30 RX ORDER — DEXTROSE MONOHYDRATE, SODIUM CHLORIDE, AND POTASSIUM CHLORIDE 50; 1.49; 4.5 G/1000ML; G/1000ML; G/1000ML
INJECTION, SOLUTION INTRAVENOUS CONTINUOUS
Status: DISCONTINUED | OUTPATIENT
Start: 2021-03-30 | End: 2021-04-01

## 2021-03-30 RX ORDER — PROCHLORPERAZINE EDISYLATE 5 MG/ML
10 INJECTION INTRAMUSCULAR; INTRAVENOUS EVERY 6 HOURS PRN
Status: DISCONTINUED | OUTPATIENT
Start: 2021-03-30 | End: 2021-04-09 | Stop reason: HOSPADM

## 2021-03-30 RX ORDER — SODIUM CHLORIDE 9 MG/ML
500 INJECTION, SOLUTION INTRAVENOUS
Status: DISCONTINUED | OUTPATIENT
Start: 2021-03-30 | End: 2021-04-09 | Stop reason: HOSPADM

## 2021-03-30 RX ORDER — FAMOTIDINE 20 MG/1
20 TABLET, FILM COATED ORAL DAILY
Status: DISCONTINUED | OUTPATIENT
Start: 2021-03-30 | End: 2021-03-31

## 2021-03-30 RX ADMIN — IOHEXOL 40 ML: 350 INJECTION, SOLUTION INTRAVENOUS at 19:52

## 2021-03-30 RX ADMIN — DEXTROSE MONOHYDRATE, SODIUM CHLORIDE, AND POTASSIUM CHLORIDE: 50; 4.5; 1.49 INJECTION, SOLUTION INTRAVENOUS at 22:17

## 2021-03-30 RX ADMIN — FAMOTIDINE 20 MG: 20 TABLET ORAL at 05:06

## 2021-03-30 RX ADMIN — PRAVASTATIN SODIUM 10 MG: 20 TABLET ORAL at 05:06

## 2021-03-30 RX ADMIN — DEXTROSE MONOHYDRATE, SODIUM CHLORIDE, AND POTASSIUM CHLORIDE: 50; 4.5; 1.49 INJECTION, SOLUTION INTRAVENOUS at 05:06

## 2021-03-30 RX ADMIN — MORPHINE SULFATE: 50 INJECTION, SOLUTION, CONCENTRATE INTRAVENOUS at 02:19

## 2021-03-30 RX ADMIN — DEXTROSE MONOHYDRATE, SODIUM CHLORIDE, AND POTASSIUM CHLORIDE: 50; 4.5; 1.49 INJECTION, SOLUTION INTRAVENOUS at 13:35

## 2021-03-30 RX ADMIN — IOHEXOL 75 ML: 350 INJECTION, SOLUTION INTRAVENOUS at 19:49

## 2021-03-30 RX ADMIN — DOCUSATE SODIUM 50 MG AND SENNOSIDES 8.6 MG 2 TABLET: 8.6; 5 TABLET, FILM COATED ORAL at 05:06

## 2021-03-30 RX ADMIN — SODIUM CHLORIDE: 9 INJECTION, SOLUTION INTRAVENOUS at 02:32

## 2021-03-30 ASSESSMENT — ENCOUNTER SYMPTOMS
COUGH: 0
ABDOMINAL PAIN: 0
FEVER: 0
ABDOMINAL PAIN: 1
VOMITING: 0
CHILLS: 0
NAUSEA: 0
SHORTNESS OF BREATH: 0

## 2021-03-30 ASSESSMENT — PAIN DESCRIPTION - PAIN TYPE
TYPE: ACUTE PAIN;SURGICAL PAIN
TYPE: ACUTE PAIN;CHRONIC PAIN;SURGICAL PAIN

## 2021-03-30 ASSESSMENT — PATIENT HEALTH QUESTIONNAIRE - PHQ9
2. FEELING DOWN, DEPRESSED, IRRITABLE, OR HOPELESS: NOT AT ALL
SUM OF ALL RESPONSES TO PHQ9 QUESTIONS 1 AND 2: 0
1. LITTLE INTEREST OR PLEASURE IN DOING THINGS: NOT AT ALL

## 2021-03-30 NOTE — PROGRESS NOTES
[facility] Medicine Daily Progress Note    Date of Service  3/30/2021    Chief Complaint  71 y.o. female admitted 3/25/2021 with complaint of chest pain radiating to her back, progressive weakness and dyspnea on exertion.  She reported unintentional weight loss, vaginal bleeding with intermittent abdominal pain since 1 year.    Hospital Course  On admission she was found to have hemoglobin of 7.1.  She was transfused 1 unit of PRBCs.  EKG showed no signs of ischemia or infarct.  D-dimer was elevated, CTA chest showed partial visualization of a markedly enlarged uterus with hypervascularity concerning for malignancy versus fibroids.  Pelvic ultrasound showed a ~ 13 cm invasive mass.    Interval Problem Update  Patient taken to the OR on 3/29/2021, and underwent total abdominal hysterectomy with salpingo oophorectomy, para-aortic and pelvic lymph node dissection, omentectomy, bilateral ureterolysis, with peritoneal biopsies, cystoscopy repair, and bilateral ureteral stent placement on 3/29/2021.    Surgery was completed last night.  On PCA pump with Morphine.     Consultants/Specialty  Gynecology    Code Status  Full Code    Disposition  TBD    Review of Systems  Review of Systems   Unable to perform ROS: Acuity of condition   Gastrointestinal: Positive for abdominal pain.        Postsurgical discomfort       Review of systems is incomplete, patient appears confused.  Unclear if it is due to postsurgical pain/narcotic pain medications    Physical Exam  Temp:  [35.9 °C (96.7 °F)-36.6 °C (97.9 °F)] 36.1 °C (96.9 °F)  Pulse:  [] 85  Resp:  [13-20] 18  BP: ()/(32-76) 122/72  SpO2:  [93 %-100 %] 93 %    Physical Exam  Constitutional:       Appearance: She is ill-appearing.   HENT:      Head: Normocephalic.      Nose: Nose normal.   Eyes:      Pupils: Pupils are equal, round, and reactive to light.   Cardiovascular:      Rate and Rhythm: Normal rate.   Abdominal:      Comments: Tender from surgery    Musculoskeletal:      Cervical back: Normal range of motion.      Right lower leg: No edema.      Left lower leg: No edema.   Skin:     General: Skin is warm.   Neurological:      General: No focal deficit present.      Mental Status: She is alert.   Psychiatric:      Comments: Calm         Fluids    Intake/Output Summary (Last 24 hours) at 3/30/2021 1440  Last data filed at 3/30/2021 1335  Gross per 24 hour   Intake 6471.5 ml   Output 1805 ml   Net 4666.5 ml       Laboratory  Recent Labs     03/28/21  0008 03/29/21  0006 03/30/21  0005   WBC 12.3* 14.4* 22.4*   RBC 4.45 4.96 4.40   HEMOGLOBIN 8.5* 10.4* 11.1*   HEMATOCRIT 29.6* 33.6* 34.6*   MCV 66.5* 67.7* 78.6*   MCH 19.1* 21.0* 25.2*   MCHC 28.7* 31.0* 32.1*   RDW 52.2* 59.4* 73.8*   PLATELETCT 376 352 267   MPV 9.4 8.8* 9.4     Recent Labs     03/28/21  0008 03/29/21  0006 03/30/21  0005   SODIUM 134* 138 138   POTASSIUM 3.8 3.4* 4.8   CHLORIDE 98 101 104   CO2 23 27 22   GLUCOSE 133* 106* 253*   BUN 11 5* 11   CREATININE 0.76 0.58 1.17   CALCIUM 9.0 9.0 8.6                   Imaging  [id number] VIEW   Final Result         1.  Nonspecific bowel gas pattern.      US-EXTREMITY VENOUS LOWER BILAT   Final Result      MR-PELVIS-WITH & W/O AND SEQUENCES   Final Result         1. Stage IV endometrial cancer. The endometrial mass itself measures 13 cm, invading through the anterior uterine wall and uterine fundus.   2. Metastatic parametrial, external iliac and para-aortic lymphadenopathy.   3. Multiple pelvic osseous metastases.   4. Several uterine fibroids.            US-PELVIC TRANSABDOMINAL ONLY   Final Result      Enlarged, heterogeneous uterus could indicate underlying fibroids though malignancy cannot be excluded.      Endometrial stripe not visualized.      CT-CTA COMPLETE THORACOABDOMINAL AORTA   Final Result      No evidence of aortic aneurysm or dissection.      Partial visualization of a markedly enlarged heterogeneous uterus with hypervascularity  concerning for malignancy versus fibroids.      Diverticulosis without evidence of diverticulitis.      Bilateral hydronephrosis and hydroureter likely related to enlarged uterus.      Nonobstructive left renal stone.      Bilateral pulmonary nodules measuring up to 5 mm.      Nodule recommendations:   Low Risk: No routine follow-up      High Risk: Optional CT at 12 months      Comments: Use most suspicious nodule as guide to management. Follow-up intervals may vary according to size and risk.      Low Risk - Minimal or absent history of smoking and of other known risk factors.      High Risk - History of smoking or of other known risk factors.      Note: These recommendations do not apply to lung cancer screening, patients with immunosuppression, or patients with known primary cancer.      Fleischner Society 2017 Guidelines for Management of Incidentally Detected Pulmonary Nodules in Adults      DX-CHEST-PORTABLE (1 VIEW)   Final Result      No acute cardiac or pulmonary abnormalities are identified.           Assessment/Plan  * Metastatic endometrial cancer (HCC)- (present on admission)  Assessment & Plan  Underwent extensive surgery on 3/29/2021 (total abdominal hysterectomy with salpingo-oophorectomy, periaortic and pelvic lymph node dissection, omentectomy, bilateral ureterolysis, peritoneal biopsies, cystoscopy repair, bilateral ureteral stent placement).  Patient appears to be in considerable pain, she is on PCA pump with morphine.  Further recommendations per gynecology    Constipation- (present on admission)  Assessment & Plan  Resolved after bowel prep pre-operative 3/29    Iron deficiency anemia due to chronic blood loss- (present on admission)  Assessment & Plan  Likely secondary to malignancy.  Transfuse as needed.    Confusion  Assessment & Plan  Patient appears confused with difficulty speaking.  Unclear if this is due to narcotic pain medications.  Neurology consulted for  input    Hypokalemia  Assessment & Plan  Monitor and replace electrolytes as needed    Hyponatremia- (present on admission)  Assessment & Plan  Resolved.  Monitor    Peripheral edema- (present on admission)  Assessment & Plan  Improved.  Monitor    Hyperlipidemia- (present on admission)  Assessment & Plan  Continue pravastatin    Prediabetes- (present on admission)  Assessment & Plan  Hemoglobin A1c was 6.1 in January 2020.  Monitor    Essential hypertension- (present on admission)  Assessment & Plan  Continue amlodipine.  Monitor       VTE prophylaxis: SCDs

## 2021-03-30 NOTE — OR NURSING
Patient arrived to PACU, monitor applied. Dr Delarosa at bedside managing hypotension, providing orders.

## 2021-03-30 NOTE — OR NURSING
Dr Delarosa updated on patient condition, persistent look of confusion and lack of speaking. No new orders received.

## 2021-03-30 NOTE — OP REPORT
Operative Report    Date: 3/29/2021    Surgeon: Shonda Quinteros M.D.     Assistant: Savita Barker CST-KALPANA Carey M.D.    Anesthesiologist: Yony Delarosa M.D.    Pre-operative Diagnosis:   Endometrial mass  Postmenopausal bleeding  Symptomatic anemia  Pelvic pain  Elevated CA-125  Pelvic and paraaortic lymphadenopathy  Vertebral and pelvic osseous lesions     Post-operative Diagnosis:   Endometrial carcinoma  Postmenopausal bleeding  Symptomatic anemia  Pelvic pain  Elevated CA-125  Pelvic and paraaortic lymphadenopathy  Vertebral and pelvic osseous lesions   Retroperitoneal fibrosis    Procedure:   Exploratory laparotomy  Total abdominal hysterectomy with bilateral salpingo-oophorectomy  Pelvic and paraaortic lymph node resection  Infracolic omentectomy  Peritoneal biopsies  Bilateral ureterolysis  Cystotomy repair  Bilateral ureteral stent placement    Indications: This is a 71 y.o. female who presented to the [facility] emergency department on 3/25/2021 with complaints of chest pain radiating to the back, weakness, and dyspnea on exertion.  She was found to be anemic with hemoglobin of 7.1, and upon further questioning is noted to have a longstanding history of abnormal uterine bleeding.  In addition to the above-noted symptoms, patient reported right-sided pelvic pain, unintentional weight loss, and urinary urgency/incontinence.  She was transfused 1 unit of packed red blood cells on admission with improvement of her hemoglobin to 8.7.  An EKG was performed which was negative for acute infarct or ischemia, normal troponin.  Additional review of her laboratory work is unremarkable aside from mild elevation of her alkaline phosphatase to 119.  The patient subsequently underwent a CTA chest and abdomen on 3/25/2021.  The study was negative for aortic aneurysm, and revealed no for chest lymphadenopathy, pleural effusion, or pulmonary consolidation.  Several small pulmonary nodules up to 5 mm were noted in the  left lower lobe and right upper lobe subpleural spaces and along the right major fissure.  The upper abdomen was normal in appearance aside from mild bilateral hydronephrosis with a nonobstructing right-sided renal calculus.  No lymphadenopathy or free fluid appreciated, and partial visualization of a large heterogenous hypervascular uterus.  Secondary to these findings a pelvic ultrasound was performed on 3/26/2021 which revealed enlarged uterus of at least 8 x 11.6 x 11.2 cm with heterogenous appearance, EEC not well visualized, left ovary 3.4 cm, right ovary 1.7 cm, no free fluid.  A follow-up MRI pelvis performed on 3/26/2021 revealed uterus 14 x 8.8 cm with a large mass measuring 13.7 x 7.3 cm, heterogenous appearing invading the full thickness of the myometrium with concern for involvement of the omentum and extending into the endocervical canal.  Several uterine fibroids were present up to 4.2 cm in the left anterior lateral uterus.  A 4.2 cm fibroid was noted over the anterior cervix, with atrophic appearing bilateral ovaries.  Bilateral pelvic lymphadenopathy was noted with external iliac lymphadenopathy up to 1.4 cm on either side, and left periaortic lymphadenopathy up to 1.8 cm with prominent aortocaval lymph node visualized near the bifurcation.  Parametrial lymph nodes up to 1.7 cm were noted in addition to hyperenhancing lesions of the left ilium up to 5.4 x 2 cm at the SI joint, posterior left acetabular lesion measuring 2.3 x 1.5 cm, as well as a hyperenhancing lesion of 1.9 cm in L5.  Secondary to the concern for endometrial malignancy, our service was consulted. Discussed with patient the concern for stage IVB endometrial carcinoma based on her imaging findings. Given her ongoing pain and vaginal bleeding with symptomatic anemia, total abdominal hysterectomy and bilateral salpingo-oophorectomy were recommended with possible pelvic and/or periaortic lymphadenectomy, tumor debulking, omentectomy,  and large and/or small bowel resection depending upon intraoperative findings and frozen section. Patient was counseled regarding all risks, benefits and alternatives including but not limited to infection, bleeding up to and requiring a transfusion, damage to surrounding organs including bowel, bladder, and ureter, pain, scarring, thromboembolism, and risks of anesthesia. Patient voiced understanding and desired to proceed. Informed consent was obtained.      Findings:   1. Upon entry into the abdomen and in the lower pelvis dense adhesive disease was noted between the anterior uterus, omentum, anterior abdominal wall and bladder  2. Bladder extensively thinned and densely adherent to the anterior surface of the uterus extending superiorly to just below the fundus, consistent with prior history of C/S x 3  3. Upper abdomen with normal diaphragmatic surfaces, anterior stomach, spleen and liver capsule  4. Lower omentum densely adherent to the anterior abdominal wall and uterine serosa in the pelvis, upper abdominal infracolic omentum with sparse tumor implants of ~ 1 cm  5. Enlarged uterus of approximately 18 weeks size with tumor nodules eroding through the anterior serosa with extensive neovascularization and inflammatory change in the adjacent tissues, and large anterior cervical fibroid approximating 5 cm  6. Small atrophic bilateral fallopian tubes and ovaries  7. Enlarged left external iliac lymph node approximating 1.5 cm, no grossly enlarged right pelvic lymph nodes appreciated   8. Enlarged right common iliac lymph node approximating 1 cm and upper left paraaortic lymph node approximating 2 cm just below the duodenal recess  9.  Mesenteric, serosal and epiploic nodules over the sigmoid colon up to 0.5 cm  10. In the cul de sac, bilateral peritoneal nodules noted approximating 1 cm each and 1 cm left pelvic sidewall nodule  11. Extensive bilateral retroperitoneal fibrosis resulting need for ureterolysis to  perform the hysterectomy   12. Small bowel normal in appearance with no serosal nodularity and sparse mesenteric nodules present up to 0.5 cm  13. Large posterior cystotomy noted at the completion of the case, in close proximity to the bilateral ureteral orifices, requiring placement of bilateral ureteral stents  14. Negative bladder leak test at completion of the repair    Procedure in detail: The patient was identified in the pre-operative holding area and brought to the operating room. Correct side and site were identified. Pre-operative antibiotics of were administered prior to the procedure. GETA was smoothly induced. She was placed in dolphin fin stirrups in the dorsal lithotomy position. The patient was prepped and draped in the usual sterile fashion. A dunn catheter was placed on the field.     A midline vertical incision was made using the scalpel from the pubic symphysis to 6 cm above the umbilicus. This was carried down through the subcutaneous tissue with electrocautery. The fascia was nicked in the midline, tented superiorly and incision from inferior to superior along the incision.  Secondary to anticipated scarring in the lower pelvis from patient's prior  sections, the abdomen was entered in the superior aspect.  The posterior rectus fascia was incised, followed by the peritoneum using sharp dissection. The peritoneal incision was extended.  Along the inferior aspect, dense adhesive disease was noted between the anterior uterus, omentum, anterior abdominal wall and bladder.  The omentum was gathered just above the site of adhesion and sequentially ligated and transected using the LigaSure device.  The omentum was then mobilized into the upper abdomen.  The rectus muscle and peritoneum were then incised laterally with mobilization off of the anterior uterus and bladder peritoneum. The uterus was noted to be quite enlarged to approximately 18 weeks size with gross tumor nodularity eroding  through the anterior serosa. The upper abdomen was explored with notation of normal-appearing diaphragmatic surfaces, anterior stomach, spleen, and liver capsule.  Palpable left-sided periaortic lymphadenopathy was appreciated just below the duodenal recess. At this point in time, I was able to gain sufficient access into the abdomen and pelvis for placement of the retractor. The Bookwalter retractor was assembled and placed.      Tumor nodules were palpated within the lower omentum, thus a decision was made to perform an infracolic omentectomy. The omentum was then elevated and filmy attachments to the transverse colon transected with electrocautery. Omental pedicles were created starting at the hepatic flexure, and the omentum was then cauterized and transected just inferior to the transverse colonic attachment. This was carried across until the splenic flexure was met at which time the omentum was fully transected and submitted for specimen.  Two isolated upper omental nodules of approximately 1 cm were noted over the infragastric segment, and excised with electrocautery.  Upon further evaluation the appendix was noted to be surgically absent.  The bowel was then run from the ileocecum to the ligament of Treitz with notation of normal serosa, and sparse mesenteric nodules up to 0.5 cm.    I then turned my attention to the pelvis.  The bilateral uterine cornua were grasped with Bosler clamps for retraction.  On the left side, the posterior broad ligament peritoneum lateral to the IP ligament was incised with the Bovie cautery.  The retroperitoneal space was opened, and significant amount of fibrosis and inflammatory change was noted.  Diffuse neovascularization of the pelvic tissues was apparent, resulting in oozing and bleeding of the surrounding tissues even with gentle manipulation.  The psoas muscle and external iliac vessels were identified.  The IP ligament was skeletonized with palpation of the ureter well  below the level of intended transection. The IP ligament was doubly clamped between two curved Zeppelin clamps, transected with the LigaSure, and doubly ligated with an 0-Vicryl suture and free tie.  A similar procedure was performed on the right side to mobilize the posterior aspect of the uterus.  The bilateral ureters were noted to be quite inflamed and adherent along the medial aspect of the posterior broad ligament peritoneum.  Secondary to the extensive amount of retroperitoneal fibrosis present, bilateral ureterolysis was performed to mobilize the ureters laterally with development of the Obayashi space.  The bilateral pelvic sidewall peritoneal edges were grasped with an Allis clamp and a right angle retractor used to undermine the ureter and reflect it laterally as the Bovie cautery was used to excise incised the peritoneal attachments.  The incision was carried down to the level of the uterosacral ligament.    Anteriorly, extensive adhesive disease was noted between the bladder, anterior uterine serosa, and anterior abdominal wall peritoneum in the lower pelvis.  It was clear that the bladder would need to be mobilized inferiorly, given that it extended to just below the level of the fundus, obscuring visualization of the bilateral uterine vasculature.  The Bovie cautery was then used to incise a plane between the bladder and the uterine serosa with notation of dense adhesions along the entire length of the anterior uterus.  Sequential cauterization and inferior reflection of the bladder was performed until the lower uterine segment was identified.  Secondary to diffuse oozing and neovascularization in the tissues adherent to the uterus, the LigaSure was then used to cauterize and transect the bilateral bladder pillars.  The bladder was then continuously mobilized off of the lower uterine segment and cervix using the Bovie cautery, until the uterine vasculature had been isolated.  A large fibroid of  approximately 5 cm was noted over the anterior cervix.  Notation was made during the course of this dissection, that the bladder serosa was quite thinned given her history of multiple prior  sections.  No obvious bladder defect was noted during the course of this dissection.  Once taken down, I was then able to place the bladder blade for the Bookwalter retractor to aid in visualization of the underlying pelvis.    I then continued with the hysterectomy. The bilateral uterine arteries were skeletonized, followed by cauterization and transection using the LigaSure device at the uterine isthmus.  A Jami clamp was used for back clamping at the superior aspect, which was subsequently ligated with 0 Vicryl suture.  The bilateral cardinal ligaments were then serially clamped with LigaSure device, cauterized and transected in a similar manner until the cervicovaginal junction was reached.  The cervix was noted to be quite small.  Two highly curved Zeppelin clamps were placed under the cervix and the specimen was transected using the Lindsey scissors. The bilateral cuff angles and midline vaginal cuff were secured with a central figure of eight suture of 0-Vicryl followed by a running suture.  In the pelvis, multiple nodules were noted over the sigmoid epiploica, mesentery, and serosa.  The superficial mesenteric and sigmoid nodules were cauterized and transected using electrocautery and submitted for permanent specimen.  The sigmoid serosal nodule was excised superficially with the Metzenbaum scissors.  No serosal defect was appreciated following excision.  In the lower pelvis along the left pelvic sidewall peritoneum, an additional 1 cm tumor nodule was noted and resected with the Bovie cautery in a similar manner.  In the posterior cul-de-sac, bilateral nodular implants were noted over the pelvic peritoneum, which were grasped with an Allis clamp and superficially excised with electrocautery.    The bilateral  pelvic lymph nodes were examined and no gross enlargement noted on the right side.  On the left, a 1.5 cm left external iliac pelvic lymph node was appreciated.  The lymph node was grasped with a DeBakey, and the Bovie cautery used to transect it from the underlying lymphatic channels and filmy attachments.  I then turned my attention to evaluation of the paraaortic lymph nodes.  An enlarged right common iliac lymph node of approximately 1 cm was appreciated.The peritoneum overlying the right common iliac artery was opened and carried superomedially over the surface of the lower aorta.  The lymph node was grasped and elevated, using surgical clips and electrocautery to excise it from the underlying filmy attachments and lymphatic channels.  In the left periaortic region just inferior to the duodenal recess, a 2 cm left periaortic lymph node was appreciated.  It was excised in a similar manner.  Venous bleeding ensued during the course of this dissection, which was made hemostatic with application of surgical clips.  Surgicel was placed over the periaortic lymphadenectomy site with excellent hemostasis noted.    At this point in time, no large residual tumor implant was appreciated in the abdomen or pelvis.  As such, decision was made to close.  The Bookwalter tract retractor was taken down and as the bladder blade was removed, a large posterior bladder defect was noted with visualization of the Beverly catheter.  The Beverly catheter was then desufflated and removed.  Examination of the defect revealed close proximity to the bilateral ureteral orifices, however did not extend into the trigone.  As such, decision was made to stent the bilateral ureters to avoid stricture at the time of closure.  The bilateral ureteral orifice ease were extremely small and very difficult to visualize.  As such, indigo carmine was given IV to facilitate visualization.  Secondary to the patient's volume status, no extrusion of the dye was  noted at the bilateral ureteral orifice ease, however none was noted in the pelvic cavity.  The right ureter was then traced to the ureteral orifice.  I was then able to eventually visualize the opening and a 0.38 mm stiff guidewire was placed and fed through the ureter until the renal pelvis was met.  A 6 x 22 double-J ureteral stent was then fed over the wire and advanced until approximately 1 cm remained at the ureteral orifice.  The guidewire was then withdrawn and stent placement was confirmed.  A similar procedure was performed on the left side.  The posterior aspect of the defect was identified at the apex and a 2-0 Vicryl running suture was used to repair the inferior half of the mucosal and muscularis defect.  The superior aspect was repaired in a similar manner and the sutures were tied in the midline.  A second layer of 2-0 Vicryl running suture was placed to reapproximate the overlying bladder serosa and peritoneum along the length of the defect.  The bladder was then backfilled with sterile water and the repair was noted to be watertight.  Secondary to close proximity of the bladder repair to the vaginal mucosa, the superior aspect of the adjacent abdominal peritoneum was mobilized inferiorly, and secured using interrupted 2-0 Vicryl sutures along the interface to prevent fistulization.  A 10 [place] KATIUSKA drain was placed in the pelvis exiting the left lower quadrant in the usual manner.    The abdomen and pelvis were copiously irrigated and excellent hemostasis noted. All laps were removed and the fascial incision was closed with a #2 Vicryl suture in a mass closure running fashion. The subcutaneous tissue was irrigated and overlying skin reapproximated with 3-0 vicryl subcuticular suture. Dressings were applied. Patient tolerated the procedure well.     The patient was awakened from general anesthetic, taken out of dorsal lithotomy position and was taken to the recovery room in stable  condition.    Sponge and needle counts were correct at the end of the case.      Specimen: uterus, cervix, bilateral adnexa, omentum, omental tumor nodules, left pelvic and paraaortic lymph nodes, right common iliac lymph node, sigmoid epiploic/serosal/mensenteric nodules, left pelvic sidewall nodules, posterior cul de sac nodules for permanent specimen    EBL: 1000 cc    Dispo: stable, extubated, to PACU

## 2021-03-30 NOTE — PROGRESS NOTES
GYN/Oncology Progress Note               Author: ZOHAIB Shahid Date & Time created: 3/30/2021  10:46 AM     Interval History:  Alert only to self, able to answer yes/no questions. Awake but stares off. Denies any pain or nausea.    Review of Systems:  Review of Systems   Constitutional: Negative for chills and fever.   Respiratory: Negative for cough and shortness of breath.    Cardiovascular: Negative for chest pain and leg swelling.   Gastrointestinal: Negative for abdominal pain, nausea and vomiting.       Physical Exam:  Physical Exam  Constitutional:       General: She is not in acute distress.  Cardiovascular:      Pulses: Normal pulses.   Pulmonary:      Effort: Pulmonary effort is normal.   Abdominal:      Comments: Midline dressing with old drainage. Left KATIUSKA drain.    Genitourinary:     Comments: Mild hematuria  Skin:     General: Skin is warm and dry.      Capillary Refill: Capillary refill takes 2 to 3 seconds.   Neurological:      Mental Status: She is alert. She is disoriented.      Cranial Nerves: Dysarthria present. No facial asymmetry.      Sensory: No sensory deficit.      Motor: Pronator drift present. No weakness.         Labs:          Recent Labs     03/28/21 0008 03/29/21 0006 03/30/21  0005   SODIUM 134* 138 138   POTASSIUM 3.8 3.4* 4.8   CHLORIDE 98 101 104   CO2 23 27 22   BUN 11 5* 11   CREATININE 0.76 0.58 1.17   MAGNESIUM  --  1.7 1.7   CALCIUM 9.0 9.0 8.6     Recent Labs     03/28/21 0008 03/29/21 0006 03/30/21  0005   ALTSGPT 7  --  584*   ASTSGOT <5*  --  753*   ALKPHOSPHAT 116*  --  86   TBILIRUBIN 0.2  --  0.5   GLUCOSE 133* 106* 253*     Recent Labs     03/28/21 0008 03/29/21 0006 03/30/21  0005   RBC 4.45 4.96 4.40   HEMOGLOBIN 8.5* 10.4* 11.1*   HEMATOCRIT 29.6* 33.6* 34.6*   PLATELETCT 376 352 267     Recent Labs     03/28/21 0008 03/29/21 0006 03/30/21  0005   WBC 12.3* 14.4* 22.4*   NEUTSPOLYS 83.00*  --   --    LYMPHOCYTES 8.00*  --   --    MONOCYTES 7.00   --   --    EOSINOPHILS 1.40  --   --    BASOPHILS 0.20  --   --    ASTSGOT <5*  --  753*   ALTSGPT 7  --  584*   ALKPHOSPHAT 116*  --  86   TBILIRUBIN 0.2  --  0.5     Recent Labs     21  0008 21  0006 21  0005   SODIUM 134* 138 138   POTASSIUM 3.8 3.4* 4.8   CHLORIDE 98 101 104   CO2 23 27 22   GLUCOSE 133* 106* 253*   BUN 11 5* 11   CREATININE 0.76 0.58 1.17   CALCIUM 9.0 9.0 8.6     Hemodynamics:  Temp (24hrs), Av.2 °C (97.1 °F), Min:35.9 °C (96.7 °F), Max:36.6 °C (97.9 °F)  Temperature: 36.1 °C (97 °F)  Pulse  Av.6  Min: 73  Max: 101   Blood Pressure : 143/76, Arterial BP: 106/57     Respiratory:    Respiration: 18, Pulse Oximetry: 94 %        RUL Breath Sounds: Clear, RML Breath Sounds: Clear, RLL Breath Sounds: Diminished, KAREN Breath Sounds: Clear, LLL Breath Sounds: Diminished  Fluids:    Intake/Output Summary (Last 24 hours) at 3/30/2021 1046  Last data filed at 3/30/2021 1019  Gross per 24 hour   Intake 5468.5 ml   Output 1655 ml   Net 3813.5 ml        GI/Nutrition:  Orders Placed This Encounter   Procedures   • Diet Order Diet: Clear Liquid     Standing Status:   Standing     Number of Occurrences:   1     Order Specific Question:   Diet:     Answer:   Clear Liquid [10]     Medical Decision Making, by Problem:  Active Hospital Problems    Diagnosis    • *Metastatic endometrial cancer (HCC) [C54.1]    • Cystitis [N30.90]    • Constipation [K59.00]    • Iron deficiency anemia due to chronic blood loss [D50.0]    • Hypokalemia [E87.6]    • Hyponatremia [E87.1]    • Peripheral edema [R60.9]    • Stage 3 chronic kidney disease [N18.30]    • Hyperlipidemia [E78.5]    • Essential hypertension [I10]    • Prediabetes [R73.03]        Plan:  70 y/o found to have a endometrial mass now s/p MACKENZIE/BSO/PALND/PLND/omentectomy/bilateral uretrolysis/peritoneal biopsies/cystotomy repair/bilateral ureteral stent placement on 3/29/21    1. POD #1: found to have an enlarged 18 week uterus with serosal  tumor involvement and peritoneal disease with densly adherent bladder. clear liquid diet, KATIUSKA drain serosang, encourage ambulation, Needs stents x6 weeks, dunn x3 weeks  2. Expressive aphasia: concern for neurologic issue, PCA decreased to 0.5 ml/hr with no improvement, discussed with Dr. Cleveland, plan for neurology consult, ok to start ASA tomorrow am if hg stable  3. Post op pain: MS PCA keep at 0.5 mg/hr to limit sedation  4. Intraop anemia: s/p 2 units intraop, hg 11 today, CTM  5. Leukocytosis: afebrile, could be reactive, recheck in am  6. Transaminitis: , , recheck in am, will also check an ammonia level, may be post anesthesia, no tylenol    Case discussed with Dr. Carey and Dr. Quinteros    Quality-Core Measures

## 2021-03-30 NOTE — PROGRESS NOTES
A&Ox1; patient only able to articulate her name, required reorientation to place, time and event. Denies pain or nausea, PCA in place. Confusion in creased post op; MD and anesthesiologist aware. PCA rate adjusted. Incision and KATIUSKA dressings in tact, ML dressing with old drainage. Beverly in tact with blood tinged output.   Weakness noted today; fall precautions and hourly rounding in place. Call light and belongings within reach.

## 2021-03-30 NOTE — PROGRESS NOTES
PT A&Ox4. Patient states she had no questions about her procedure today, POC discussed. Denies pain or nausea. Reports swelling in left leg, MD aware. VSS. NPO status for procedure today. Call light and belongings within reach,patient calls appropriately. Fall precautions and hourly rounding in place.

## 2021-03-30 NOTE — ASSESSMENT & PLAN NOTE
Improved  Patient with receptive and expressive aphasia, albeit improving  Confused after her extensive surgeries  Psychiatry consulted for capacity determination: she does not have capacity to make medical decisions  Patient does appear to have capacity to make her significant other Star POA  Notary pending to make Star POA

## 2021-03-30 NOTE — CARE PLAN
Problem: Pain Management  Goal: Pain level will decrease to patient's comfort goal  Outcome: PROGRESSING AS EXPECTED  Intervention: Educate and implement non-pharmacologic comfort measures. Examples: relaxation, distration, play therapy, activity therapy, massage, etc.  Flowsheets (Taken 3/30/2021 1541)  Intervention:   Bolus button   Cold Pack  Note: Morphine PCA in place. Patient reports minimal to no pain.      Problem: Communication  Goal: The ability to communicate needs accurately and effectively will improve  Outcome: PROGRESSING SLOWER THAN EXPECTED  Intervention: Collaborate with speech therapy  Note: Dysarthria post op. Patient can nod and shake head no to simple questions.

## 2021-03-30 NOTE — OR SURGEON
Immediate Post OP Note    PreOp Diagnosis:   Endometrial mass  Postmenopausal bleeding  Symptomatic anemia  Pelvic pain  Elevated CA-125  Pelvic and paraaortic lymphadenopathy  Osseous lesions     PostOp Diagnosis: Endometrial carcinoma  Postmenopausal bleeding  Symptomatic anemia  Pelvic pain  Elevated CA-125  Pelvic and paraaortic lymphadenopathy  Osseous lesions     Procedure(s):  HYSTERECTOMY, TOTAL, ABDOMINAL- - Wound Class: Clean Contaminated  SALPINGO-OOPHORECTOMY - Wound Class: Clean Contaminated  Paraaortic and pelvic Lymph Node dissection - Wound Class: Clean Contaminated  OMENTECTOMY - Wound Class: Clean Contaminated  Bilateral Ureterolysis  Peritoneal biopsies  Cystotomy Repair    Bilateral Ureteral Stent placement - Wound Class: Clean Contaminated    Surgeon(s):  HAIDER Mix M.D.    Anesthesiologist/Type of Anesthesia:  Anesthesiologist: Yony Delarosa M.D./General    Surgical Staff:  Assistant: Margi Barker R.N.  Circulator: Carin Johnson; Heriberto Thomas R.N.  Monitoring Nurse: Johny Tello R.N.  Relief Circulator: Dot Whipple R.N.  Relief Scrub: Emerald Pittman  Scrub Person: Lisa Gonzalez    Specimens removed if any:  ID Type Source Tests Collected by Time Destination   A : Omentum Tissue Abdominal PATHOLOGY SPECIMEN Shonda Quinteros M.D. 3/29/2021  3:57 PM    B : Uterus, bilateral tuves and bilateral ovaries Tissue Uterus PATHOLOGY SPECIMEN Shonda Quinteros M.D. 3/29/2021  4:04 PM    C : Left pelvic lymph node Tissue Lymph Node PATHOLOGY SPECIMEN Shonda Quinteros M.D. 3/29/2021  4:48 PM    D : Epiploica sigmoid nodule Tissue Colon - Sigmoid PATHOLOGY SPECIMEN Shonda Quinteros M.D. 3/29/2021  4:50 PM    E : Sigmoid mesenteric nodule Tissue Colon - Sigmoid PATHOLOGY SPECIMEN Shonda Quinteros M.D. 3/29/2021  4:52 PM    F : sigmoid serosa nodule Tissue Colon - Sigmoid PATHOLOGY SPECIMEN Shonda Quinteros M.D. 3/29/2021  4:53 PM    G : Left pelvic sidewall  nodule Tissue Colon - Sigmoid PATHOLOGY SPECIMEN Shonda Quinteros M.D. 3/29/2021  4:56 PM    H : Right common ilica lymph node Tissue Lymph Node PATHOLOGY SPECIMEN Shonda Quinteros M.D. 3/29/2021  5:00 PM    I : Left periaortic lymph node Tissue Lymph Node PATHOLOGY SPECIMEN Shonda Quinteros M.D. 3/29/2021  5:22 PM    J : Upper omental nodule Tissue Abdominal PATHOLOGY SPECIMEN Shonda Quinteros M.D. 3/29/2021  5:26 PM    K : Left posterior cul-de-sac nodule Tissue Abdominal PATHOLOGY SPECIMEN Shonda Quinteros M.D. 3/29/2021  5:57 PM    L : Right posterior cul-de-sac nodule Tissue Abdominal PATHOLOGY SPECIMEN Shonda Quinteros M.D. 3/29/2021  5:58 PM        Estimated Blood Loss: 1000 cc    Findings:   1. Upon entry into the abdomen and in the lower pelvis dense adhesive disease was noted between the anterior uterus, omentum, anterior abdominal wall and bladder  2. Bladder extensively thinned and densely adherent to the anterior surface of the uterus extending superiorly to just below the fundus, consistent with prior history of C/S x 3  3. Upper abdomen with normal diaphragmatic surfaces, anterior stomach, spleen and liver capsule  4. Lower omentum densely adherent to the anterior abdominal wall and uterine serosa in the pelvis, upper abdominal infracolic omentum with sparse tumor implants of ~ 1 cm  5. Enlarged uterus of approximately 18 weeks size with tumor nodules eroding through the anterior serosa with extensive neovascularization and inflammatory change in the adjacent tissues, and large anterior cervical fibroid approximating 5 cm  6. Small atrophic bilateral fallopian tubes and ovaries  7. Enlarged left external iliac lymph node approximating 1.5 cm, no grossly enlarged right pelvic lymph nodes appreciated   8. Enlarged right common iliac lymph node approximating 1 cm and upper left paraaortic lymph node approximating 2 cm just below the duodenal recess  9.  Mesenteric, serosal and epiploic nodules over  the sigmoid colon up to 0.5 cm  10. In the cul de sac, bilateral peritoneal nodules noted approximating 1 cm each and 1 cm left pelvic sidewall nodule  11. 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 Small bowel normal in appearance with no serosal nodularity and sparse mesenteric nodules present up to 0.5 cm  13. Large posterior cystotomy noted at the completion of the case, in close proximity to the bilateral ureteral orifices, requiring placement of bilateral ureteral stents  14. Negative bladder leak test at completion of the repair    Complications: Cystotomy      3/29/2021 9:18 PM Shonda Quinteros M.D.

## 2021-03-30 NOTE — ANESTHESIA TIME REPORT
Anesthesia Start and Stop Event Times     Date Time Event    3/29/2021 1401 Ready for Procedure     1439 Anesthesia Start     2015 Anesthesia Stop        Responsible Staff  03/29/21    Name Role Begin End    Yony Delarosa M.D. Anesth 1439 2015        Preop Diagnosis (Free Text):  Pre-op Diagnosis     Endometrial mass, abnormal uterine bleeding, anemia secondary to abnormal uterine bleeding        Preop Diagnosis (Codes):    Post op Diagnosis  Uterine cancer (HCC)      Premium Reason  A. 3PM - 7AM    Comments: Emergent; arterial line with US, bilateral TAP blocks.

## 2021-03-30 NOTE — ANESTHESIA POSTPROCEDURE EVALUATION
Patient: Graciela Desai    Procedure Summary     Date: 03/29/21 Room / Location: [facility] OR 08 / SURGERY [facility]    Anesthesia Start: 1439 Anesthesia Stop: 2015    Procedures:       HYSTERECTOMY, TOTAL, ABDOMINAL- (N/A Pelvis)      SALPINGO-OOPHORECTOMY (Bilateral Pelvis)      Paraaortic and pelvic Lymph Node dissection (Bilateral Pelvis)      OMENTECTOMY (N/A Abdomen) Diagnosis: (Endometrial cancer)    Surgeons: Shonda Quinteros M.D. Responsible Provider: Yony Delarosa M.D.    Anesthesia Type: general ASA Status: 3 - Emergent          Final Anesthesia Type: general  Last vitals  BP   Blood Pressure : (!) 90/59, Arterial BP: (!) 98/53    Temp   36.6 °C (97.9 °F)    Pulse   (!) 101   Resp   18    SpO2   100 %      Anesthesia Post Evaluation    Patient location during evaluation: PACU  Patient participation: complete - patient participated  Level of consciousness: sleepy but conscious  Pain score: 0    Airway patency: patent  Anesthetic complications: no  Cardiovascular status: adequate  Respiratory status: acceptable  Hydration status: euvolemic  Comments: Stayed with patient in PACU for prolonged period of time to ensure adequate hemodynamic stability. BP normalized. Moving and responsive (though sleepy). 1 unit pRBC ordered. Advised RN to continue close monitoring as patient had prolonged surgery.     PONV: none          No complications documented.     Nurse Pain Score: 0 (NPRS)

## 2021-03-30 NOTE — CONSULTS
Neurology STROKE CODE H&P  Neurohospitalist Service, [facility] Neurosciences    Referring Physician: Emani Cleveland M.D.    STROKE CODE:   Chief Complaint   Patient presents with   • Chest Pain     mid epigastric   • Back Pain     radiating from right scapula to lower back       To obtain the most accurate data regarding the time called, and time patient seen, refer to the stroke run-sheet and chart.  For time of CT, refer to the radiology report. See A&P below for TPA Decision and door to needle time if and when applicable.    HPI: 71-year-old female who underwent extensive surgery for metastatic endometrial cancer yesterday.  This morning patient was noted to be confused.  No focal weakness or numbness.  She was on a PCA pump for morphine.    Other medical history she has iron deficient anemia  Hyperlipidemia  Hypertension      Patient is unable to give any type of meaningful history.  She does follow.  Can only use yes no questioning.    Appears she noticed the symptoms when she woke up this morning.  Therefore onset was sometime late last night around 10PM        Review of systems: In addition to what is detailed in the HPI above, (and scanned into the chart if and when applicable), all other systems reviewed and are negative.    Past Medical History:    has a past medical history of Hypertension.    FHx:  family history includes Alcohol/Drug in her mother; Heart Disease in her mother; No Known Problems in her father.    SHx:   reports that she quit smoking about 24 years ago. Her smoking use included cigarettes. She started smoking about 25 years ago. She has a 2.00 pack-year smoking history. She has never used smokeless tobacco. She reports that she does not drink alcohol and does not use drugs.    Allergies:  No Known Allergies    Medications:    Current Facility-Administered Medications:   •  Pharmacy Consult Request ...Pain Management Review 1 Each, 1 Each, Other, PHARMACY TO Shonda CHURCHILL  HAIDER Quinteros  •  ondansetron (ZOFRAN) syringe/vial injection 4 mg, 4 mg, Intravenous, Q6HRS PRN, Shonda Quinteros M.D.  •  NS (BOLUS) infusion 500 mL, 500 mL, Intravenous, Once PRN **AND** Notify Provider, , , Once, Shonda Quinteros M.D.  •  dextrose 5 % and 0.45 % NaCl with KCl 20 mEq, , Intravenous, Continuous, Shonda Quinteros M.D., Last Rate: 125 mL/hr at 03/30/21 1335, New Bag at 03/30/21 1335  •  prochlorperazine (COMPAZINE) injection 10 mg, 10 mg, Intravenous, Q6HRS PRN, Shonda Quinteros M.D.  •  famotidine (PEPCID) tablet 20 mg, 20 mg, Oral, DAILY, 20 mg at 03/30/21 0506 **OR** famotidine (PEPCID) injection 20 mg, 20 mg, Intravenous, DAILY, Shonda Quinteros M.D.  •  ketorolac (TORADOL) injection 15 mg, 15 mg, Intravenous, Q6HRS PRN, Shonda Quinteros M.D.  •  morphine 1 mg/mL in 50 mL (PCA), , Intravenous, Continuous, Ciara Holt, A.P.R.N., Rate Change at 03/30/21 1115  •  pravastatin (PRAVACHOL) tablet 10 mg, 10 mg, Oral, DAILY, Yara Ramires, A.P.R.N., 10 mg at 03/30/21 0506  •  senna-docusate (PERICOLACE or SENOKOT S) 8.6-50 MG per tablet 2 tablet, 2 tablet, Oral, BID, 2 tablet at 03/30/21 0506 **AND** polyethylene glycol/lytes (MIRALAX) PACKET 1 Packet, 1 Packet, Oral, QDAY PRN **AND** magnesium hydroxide (MILK OF MAGNESIA) suspension 30 mL, 30 mL, Oral, QDAY PRN **AND** bisacodyl (DULCOLAX) suppository 10 mg, 10 mg, Rectal, QDAY PRN, Jewel Sawyer M.D.  •  acetaminophen (Tylenol) tablet 650 mg, 650 mg, Oral, Q6HRS PRN, Jewel Sawyer M.D., 650 mg at 03/26/21 1324  •  ondansetron (ZOFRAN ODT) dispertab 4 mg, 4 mg, Oral, Q4HRS PRN, Jewel Sawyer M.D.  •  amLODIPine (NORVASC) tablet 5 mg, 5 mg, Oral, Q DAY, Jewel Sawyer M.D., 5 mg at 03/28/21 1734    Physical Examination:    Vitals:    03/30/21 0000 03/30/21 0430 03/30/21 0822 03/30/21 1251   BP: 113/70 116/76 143/76 122/72   Pulse: 97 96 89 85   Resp: 18 18 18 18   Temp: 35.9 °C (96.7 °F) 36.1 °C (97 °F) 36.1 °C (97 °F) 36.1 °C  (96.9 °F)   TempSrc: Temporal Temporal Temporal Temporal   SpO2: 95% 95% 94% 93%   Weight:       Height:           General: Patient is awake and in no acute distress  Eyes: examination of optic disks not indicated at this time  CV: RRR    NEUROLOGICAL EXAM:     Mental status: Awake, alert and fully oriented, follows commands  Speech and language: Speech is garbled.  Cannot make out any words.  However she does follow some commands.  Such as show me 2 fingers on right hand.  When asked her to show me her right thumb she showed me her fist though.  She cannot name objects.  She tries but I cannot make out anything she says.  Cranial nerve exam: Pupils are equal, round and reactive to light bilaterally. Visual fields are full. Extraocular muscles are intact. Sensation in the face is intact to light touch. Face is symmetric. Hearing to finger rub equal. Palate elevates symmetrically. Shoulder shrug is full. Tongue is midline.  Motor exam: Strength is 5/5 in all extremities both distally and proximally. Tone is normal. No abnormal movements were seen on exam.  Sensory exam: No sensory deficits identified   Deep tendon reflexes:  2+ and symmetric. Toes down-going bilaterally.  Coordination: no ataxia   Gait: deferred due to confusion    NIH Stroke Scale:    1a. Level of Consciousness (Alert, drowsy, etc): 0= Alert    1b. LOC Questions (Month, age): 2= Incorrect    1c. LOC Commands (Open/close eyes make fist/let go): 0= Obeys both correctly    2.   Best Gaze (Eyes open - patient follows examiner's finger on face): 0= Normal    3.   Visual Fields (introduce visual stimulus/threat to patient's field quadrants): 0= No visual loss  4.   Facial Paresis (Show teeth, raise eyebrows and squeeze eyes shut): 0= Normal     5a. Motor Arm - Left (Elevate arm to 90 degrees if patient is sitting, 45 degrees if  supine): 0= No drift    5b. Motor Arm - Right (Elevate arm to 90 degrees if patient is sitting, 45 degrees if supine): 0= No  drift    6a. Motor Leg - Left (Elevate leg 30 degrees with patient supine): 0= No drift    6b. Motor Leg - Right  (Elevate leg 30 degrees with patient supine): 0= No drift    7.   Limb Ataxia (Finger-nose, heel down shin): 0= No ataxia    8.   Sensory (Pin prick to face, arm, trunk and leg - compare side to side): 0= Normal    9.  Best Language (Name item, describe a picture and read sentences): 2= Severe aphasia    10. Dysarthria (Evaluate speech clarity by patient repeating listed words): 2= Near to unintelligible or worse    11. Extinction and Inattention (Use information from prior testing to identify neglect or  double simultaneous stimuli testing): 0= No neglect    Total NIH Score: 6    Modified Trego Scale (MRS): 0 = No symptoms      Objective Data:    Labs:  Lab Results   Component Value Date/Time    PROTHROMBTM 14.2 03/27/2021 03:30 AM    INR 1.07 03/27/2021 03:30 AM      Lab Results   Component Value Date/Time    WBC 22.4 (H) 03/30/2021 12:05 AM    RBC 4.40 03/30/2021 12:05 AM    HEMOGLOBIN 11.1 (L) 03/30/2021 12:05 AM    HEMATOCRIT 34.6 (L) 03/30/2021 12:05 AM    MCV 78.6 (L) 03/30/2021 12:05 AM    MCH 25.2 (L) 03/30/2021 12:05 AM    MCHC 32.1 (L) 03/30/2021 12:05 AM    MPV 9.4 03/30/2021 12:05 AM    NEUTSPOLYS 83.00 (H) 03/28/2021 12:08 AM    LYMPHOCYTES 8.00 (L) 03/28/2021 12:08 AM    MONOCYTES 7.00 03/28/2021 12:08 AM    EOSINOPHILS 1.40 03/28/2021 12:08 AM    BASOPHILS 0.20 03/28/2021 12:08 AM    HYPOCHROMIA 1+ 03/25/2021 09:50 PM      Lab Results   Component Value Date/Time    SODIUM 138 03/30/2021 12:05 AM    POTASSIUM 4.8 03/30/2021 12:05 AM    CHLORIDE 104 03/30/2021 12:05 AM    CO2 22 03/30/2021 12:05 AM    GLUCOSE 253 (H) 03/30/2021 12:05 AM    BUN 11 03/30/2021 12:05 AM    CREATININE 1.17 03/30/2021 12:05 AM      Lab Results   Component Value Date/Time    CHOLSTRLTOT 204 (H) 01/30/2020 09:44 AM     (H) 01/30/2020 09:44 AM    HDL 60 01/30/2020 09:44 AM    TRIGLYCERIDE 108 01/30/2020  09:44 AM       Lab Results   Component Value Date/Time    ALKPHOSPHAT 86 03/30/2021 12:05 AM    ASTSGOT 753 (H) 03/30/2021 12:05 AM    ALTSGPT 584 (H) 03/30/2021 12:05 AM    TBILIRUBIN 0.5 03/30/2021 12:05 AM        Imaging/Testing:  TC-SEATURW-2 VIEW   Final Result         1.  Nonspecific bowel gas pattern.      US-EXTREMITY VENOUS LOWER BILAT   Final Result      MR-PELVIS-WITH & W/O AND SEQUENCES   Final Result         1. Stage IV endometrial cancer. The endometrial mass itself measures 13 cm, invading through the anterior uterine wall and uterine fundus.   2. Metastatic parametrial, external iliac and para-aortic lymphadenopathy.   3. Multiple pelvic osseous metastases.   4. Several uterine fibroids.            US-PELVIC TRANSABDOMINAL ONLY   Final Result      Enlarged, heterogeneous uterus could indicate underlying fibroids though malignancy cannot be excluded.      Endometrial stripe not visualized.      CT-CTA COMPLETE THORACOABDOMINAL AORTA   Final Result      No evidence of aortic aneurysm or dissection.      Partial visualization of a markedly enlarged heterogeneous uterus with hypervascularity concerning for malignancy versus fibroids.      Diverticulosis without evidence of diverticulitis.      Bilateral hydronephrosis and hydroureter likely related to enlarged uterus.      Nonobstructive left renal stone.      Bilateral pulmonary nodules measuring up to 5 mm.      Nodule recommendations:   Low Risk: No routine follow-up      High Risk: Optional CT at 12 months      Comments: Use most suspicious nodule as guide to management. Follow-up intervals may vary according to size and risk.      Low Risk - Minimal or absent history of smoking and of other known risk factors.      High Risk - History of smoking or of other known risk factors.      Note: These recommendations do not apply to lung cancer screening, patients with immunosuppression, or patients with known primary cancer.      Fleischner Society 2017  Guidelines for Management of Incidentally Detected Pulmonary Nodules in Adults      DX-CHEST-PORTABLE (1 VIEW)   Final Result      No acute cardiac or pulmonary abnormalities are identified.      CT-CTA NECK WITH & W/O-POST PROCESSING    (Results Pending)   CT-CTA HEAD WITH & W/O-POST PROCESS    (Results Pending)   MR-BRAIN-WITH & W/O    (Results Pending)     Not a TPA candidate due to outside window.  And also recent extensive abdominal surgery yesterday.    Assessment and Plan:    71-year-old female admitted due to extensive metastatic endometrial cancer.  Status post surgery yesterday.  Since this morning patient noticed problems speaking.  In my opinion she has of a severe expressive aphasia.  This most likely a small infarct given that she has no focal weakness.  However will rule out LVO with a stat CT head and neck now.  Start aspirin when cleared by OB and medicine.  She does had history of anemia recently and had extensive surgery yesterday.    Plan:  1.  CTA head and neck stat   2.  MRI brain with and without contrast.  Doing with contrast due to cancer history.  3.  Start 81 mg aspirin when cleared by medicine and OB.  4.  Telemonitoring  5.  PT/OT/speech  6.  Blood pressure is controlled currently 120-140 systolic, okay with permissive hypertension  7.  Glucose per primary team recommendations normoglycemia  8.  Can hold off on getting TTE for now wait for MRI brain.      The evaluation of the patient, and recommended management, was discussed with the resident staff.     This chart was partially generated using voice recognition technology and sound alike word replacement may be present, best efforts were made to make the chart accurate.    Clem Palomo MD  Board Certified Neurology, ABPN  (t) [phone]

## 2021-03-31 ENCOUNTER — APPOINTMENT (OUTPATIENT)
Dept: RADIOLOGY | Facility: MEDICAL CENTER | Age: 71
DRG: 739 | End: 2021-03-31
Attending: PSYCHIATRY & NEUROLOGY
Payer: MEDICARE

## 2021-03-31 ENCOUNTER — APPOINTMENT (OUTPATIENT)
Dept: RADIOLOGY | Facility: MEDICAL CENTER | Age: 71
DRG: 739 | End: 2021-03-31
Attending: STUDENT IN AN ORGANIZED HEALTH CARE EDUCATION/TRAINING PROGRAM
Payer: MEDICARE

## 2021-03-31 LAB
ALBUMIN SERPL BCP-MCNC: 2 G/DL (ref 3.2–4.9)
ALBUMIN/GLOB SERPL: 0.7 G/DL
ALP SERPL-CCNC: 129 U/L (ref 30–99)
ALT SERPL-CCNC: 869 U/L (ref 2–50)
AMMONIA PLAS-SCNC: 13 UMOL/L (ref 11–45)
ANION GAP SERPL CALC-SCNC: 6 MMOL/L (ref 7–16)
ANISOCYTOSIS BLD QL SMEAR: ABNORMAL
AST SERPL-CCNC: 990 U/L (ref 12–45)
BASOPHILS # BLD AUTO: 0.1 % (ref 0–1.8)
BASOPHILS # BLD: 0.02 K/UL (ref 0–0.12)
BILIRUB SERPL-MCNC: 0.2 MG/DL (ref 0.1–1.5)
BUN SERPL-MCNC: 13 MG/DL (ref 8–22)
BURR CELLS BLD QL SMEAR: NORMAL
CALCIUM SERPL-MCNC: 8 MG/DL (ref 8.5–10.5)
CHLORIDE SERPL-SCNC: 107 MMOL/L (ref 96–112)
CO2 SERPL-SCNC: 23 MMOL/L (ref 20–33)
COMMENT 1642: NORMAL
CREAT SERPL-MCNC: 1.01 MG/DL (ref 0.5–1.4)
DACRYOCYTES BLD QL SMEAR: NORMAL
EOSINOPHIL # BLD AUTO: 0.01 K/UL (ref 0–0.51)
EOSINOPHIL NFR BLD: 0.1 % (ref 0–6.9)
GLOBULIN SER CALC-MCNC: 2.7 G/DL (ref 1.9–3.5)
GLUCOSE SERPL-MCNC: 157 MG/DL (ref 65–99)
HCT VFR BLD AUTO: 24.8 % (ref 37–47)
HCT VFR BLD AUTO: 26.9 % (ref 37–47)
HGB BLD-MCNC: 8.1 G/DL (ref 12–16)
HGB BLD-MCNC: 8.4 G/DL (ref 12–16)
IMM GRANULOCYTES # BLD AUTO: 0.08 K/UL (ref 0–0.11)
IMM GRANULOCYTES NFR BLD AUTO: 0.6 % (ref 0–0.9)
LYMPHOCYTES # BLD AUTO: 0.92 K/UL (ref 1–4.8)
LYMPHOCYTES NFR BLD: 6.7 % (ref 22–41)
MCH RBC QN AUTO: 24.6 PG (ref 27–33)
MCHC RBC AUTO-ENTMCNC: 31.2 G/DL (ref 33.6–35)
MCV RBC AUTO: 78.7 FL (ref 81.4–97.8)
MICROCYTES BLD QL SMEAR: ABNORMAL
MONOCYTES # BLD AUTO: 0.94 K/UL (ref 0–0.85)
MONOCYTES NFR BLD AUTO: 6.8 % (ref 0–13.4)
MORPHOLOGY BLD-IMP: NORMAL
NEUTROPHILS # BLD AUTO: 11.83 K/UL (ref 2–7.15)
NEUTROPHILS NFR BLD: 85.7 % (ref 44–72)
NRBC # BLD AUTO: 0 K/UL
NRBC BLD-RTO: 0 /100 WBC
PLATELET # BLD AUTO: 232 K/UL (ref 164–446)
PLATELET BLD QL SMEAR: NORMAL
PMV BLD AUTO: 9.8 FL (ref 9–12.9)
POIKILOCYTOSIS BLD QL SMEAR: NORMAL
POLYCHROMASIA BLD QL SMEAR: NORMAL
POTASSIUM SERPL-SCNC: 4.7 MMOL/L (ref 3.6–5.5)
PROT SERPL-MCNC: 4.7 G/DL (ref 6–8.2)
RBC # BLD AUTO: 3.42 M/UL (ref 4.2–5.4)
RBC BLD AUTO: PRESENT
SODIUM SERPL-SCNC: 136 MMOL/L (ref 135–145)
TARGETS BLD QL SMEAR: NORMAL
WBC # BLD AUTO: 13.8 K/UL (ref 4.8–10.8)

## 2021-03-31 PROCEDURE — 70553 MRI BRAIN STEM W/O & W/DYE: CPT | Mod: MG

## 2021-03-31 PROCEDURE — 85014 HEMATOCRIT: CPT

## 2021-03-31 PROCEDURE — A9576 INJ PROHANCE MULTIPACK: HCPCS | Performed by: PSYCHIATRY & NEUROLOGY

## 2021-03-31 PROCEDURE — A9270 NON-COVERED ITEM OR SERVICE: HCPCS | Performed by: STUDENT IN AN ORGANIZED HEALTH CARE EDUCATION/TRAINING PROGRAM

## 2021-03-31 PROCEDURE — 92610 EVALUATE SWALLOWING FUNCTION: CPT

## 2021-03-31 PROCEDURE — 700111 HCHG RX REV CODE 636 W/ 250 OVERRIDE (IP): Performed by: STUDENT IN AN ORGANIZED HEALTH CARE EDUCATION/TRAINING PROGRAM

## 2021-03-31 PROCEDURE — 770006 HCHG ROOM/CARE - MED/SURG/GYN SEMI*

## 2021-03-31 PROCEDURE — 70450 CT HEAD/BRAIN W/O DYE: CPT | Mod: ME

## 2021-03-31 PROCEDURE — 700102 HCHG RX REV CODE 250 W/ 637 OVERRIDE(OP): Performed by: STUDENT IN AN ORGANIZED HEALTH CARE EDUCATION/TRAINING PROGRAM

## 2021-03-31 PROCEDURE — 80053 COMPREHEN METABOLIC PANEL: CPT

## 2021-03-31 PROCEDURE — 700117 HCHG RX CONTRAST REV CODE 255: Performed by: PSYCHIATRY & NEUROLOGY

## 2021-03-31 PROCEDURE — 85018 HEMOGLOBIN: CPT

## 2021-03-31 PROCEDURE — 770020 HCHG ROOM/CARE - TELE (206)

## 2021-03-31 PROCEDURE — 82140 ASSAY OF AMMONIA: CPT

## 2021-03-31 PROCEDURE — 700102 HCHG RX REV CODE 250 W/ 637 OVERRIDE(OP): Performed by: OBSTETRICS & GYNECOLOGY

## 2021-03-31 PROCEDURE — 36415 COLL VENOUS BLD VENIPUNCTURE: CPT

## 2021-03-31 PROCEDURE — 85025 COMPLETE CBC W/AUTO DIFF WBC: CPT

## 2021-03-31 PROCEDURE — A9270 NON-COVERED ITEM OR SERVICE: HCPCS | Performed by: OBSTETRICS & GYNECOLOGY

## 2021-03-31 PROCEDURE — 700101 HCHG RX REV CODE 250: Performed by: OBSTETRICS & GYNECOLOGY

## 2021-03-31 PROCEDURE — 99233 SBSQ HOSP IP/OBS HIGH 50: CPT | Performed by: PSYCHIATRY & NEUROLOGY

## 2021-03-31 RX ORDER — HALOPERIDOL 5 MG/ML
1 INJECTION INTRAMUSCULAR EVERY 6 HOURS PRN
Status: DISCONTINUED | OUTPATIENT
Start: 2021-03-31 | End: 2021-04-09 | Stop reason: HOSPADM

## 2021-03-31 RX ADMIN — DEXTROSE MONOHYDRATE, SODIUM CHLORIDE, AND POTASSIUM CHLORIDE: 50; 4.5; 1.49 INJECTION, SOLUTION INTRAVENOUS at 22:11

## 2021-03-31 RX ADMIN — FAMOTIDINE 20 MG: 20 TABLET ORAL at 05:32

## 2021-03-31 RX ADMIN — HALOPERIDOL LACTATE 1 MG: 5 INJECTION, SOLUTION INTRAMUSCULAR at 22:06

## 2021-03-31 RX ADMIN — DOCUSATE SODIUM 50 MG AND SENNOSIDES 8.6 MG 2 TABLET: 8.6; 5 TABLET, FILM COATED ORAL at 05:32

## 2021-03-31 RX ADMIN — GADOTERIDOL 12 ML: 279.3 INJECTION, SOLUTION INTRAVENOUS at 10:38

## 2021-03-31 RX ADMIN — PRAVASTATIN SODIUM 40 MG: 20 TABLET ORAL at 05:32

## 2021-03-31 RX ADMIN — DOCUSATE SODIUM 50 MG AND SENNOSIDES 8.6 MG 2 TABLET: 8.6; 5 TABLET, FILM COATED ORAL at 16:46

## 2021-03-31 RX ADMIN — DEXTROSE MONOHYDRATE, SODIUM CHLORIDE, AND POTASSIUM CHLORIDE: 50; 4.5; 1.49 INJECTION, SOLUTION INTRAVENOUS at 06:33

## 2021-03-31 ASSESSMENT — COGNITIVE AND FUNCTIONAL STATUS - GENERAL
SUGGESTED CMS G CODE MODIFIER MOBILITY: CK
MOBILITY SCORE: 19
STANDING UP FROM CHAIR USING ARMS: A LITTLE
HELP NEEDED FOR BATHING: A LITTLE
DAILY ACTIVITIY SCORE: 19
DRESSING REGULAR UPPER BODY CLOTHING: A LITTLE
WALKING IN HOSPITAL ROOM: A LITTLE
SUGGESTED CMS G CODE MODIFIER DAILY ACTIVITY: CK
TOILETING: A LITTLE
MOVING FROM LYING ON BACK TO SITTING ON SIDE OF FLAT BED: A LITTLE
CLIMB 3 TO 5 STEPS WITH RAILING: A LITTLE
DRESSING REGULAR LOWER BODY CLOTHING: A LITTLE
MOVING TO AND FROM BED TO CHAIR: A LITTLE
PERSONAL GROOMING: A LITTLE

## 2021-03-31 ASSESSMENT — FIBROSIS 4 INDEX: FIB4 SCORE: 9.99

## 2021-03-31 ASSESSMENT — PAIN DESCRIPTION - PAIN TYPE: TYPE: ACUTE PAIN;SURGICAL PAIN

## 2021-03-31 NOTE — THERAPY
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  AMS 3/30/21 with new onset expressive aphasia without extremity weakness. MRI 3/31 reveals moderate sized L MCA acute infarct with possible earhly hemorrhagic transformation, scattered punctate and sm acute infarcts bilateral parieto- occ and L cerebellar regions.  3/25 chest :negative for acute process. Currently with clear liquids, +KATIUSKA drain, stents x6 wks.  Pt seen for BSE with spouse in attendance and presents with 'yes' responses for all questions.  Pt has difficulty following oral motor directives, with visual model provided; suspect apraxia.  Pt also displays significant non-fluent aphasia with poor repetition at the multi-syllabic word level.  Regarding swallow, pt with a variable delay in triggering a swallow to palpation, with 3-15 second delay, most significant during moist/minced trials, with bolus loss and mild residue in bilateral lateral sulci.  Vocal quality remains clear, however variable delay in triggering pharyngeal swallow for purees, mildly thick liquids, is concerning for descending aspiration risk. S/s of penetration and/or aspiration were present for 50% of thin liquid swallows as well.  An additional diagnostic is indicated and RN/spouse is aware of recommendations.  Pending FEES, if cleared by GI Md, modified diet is recommended and RN is to contact Md regarding FEES orders.     Plan    Recommend PU4/MT2 once  cleared by Md for advance from 'clear liquid' diet, pending FEES.  Request orders for 1) FEES, and 2) SLP for cognitive-linguistic evaluation/tx due to new onset aphasia, suspected apraxia.    Recommend Speech Therapy 5 times per week until therapy goals are met for the following treatments:  Dysphagia Training and Patient / Family / Caregiver Education.    Discharge Recommendations: (P) Recommend post-acute placement for additional speech therapy services prior to discharge home    Subjective    Unable to formulate due to aphasia.     Objective       03/31/21 1353   Barriers To Oral Feeding   Barriers To Oral Feeding Impaired Cognition / Attention  (suspect apraxia, poor aud. comp, even with visual model)   Pre-Feeding Oral Stimulation Trial Inconsistent Responses  (refer to notes; variable swallow trigger)   Laryngeal Function   Volutional Cough Minimal   Excursion Upon Swallow Weak    Oral Food Presentation   Ice Chips Minimal   Serial Swallow Mildly Thick (2) - (Nectar Thick) Minimal   Serial Swallow Thin (0) Moderate  (min-mod)   Minced & Moist (5) - (Dysphagia II)   (min-mod; delay increased in triggering swallow w/ this textu)   Soft & Bite-Sized (6) - (Dysphagia III) Minimal   Dysphagia Strategies / Recommendations   Strategies / Interventions Recommended (Yes / No) Yes   Compensatory Strategies Strict 1:1 Feeding;Head of Bed 90 Degrees During Eating / Drinking;No Straws;Liquids Via Cup;Liquids Via Spoon;Single Sips / Bites   Diet / Liquid Recommendation Puree (4);Mildly Thick (2) - (Nectar Thick)  (when cleared by GI md)   Medication Administration  Crush all Medications in Puree   Therapy Interventions Dysphagia Therapy By Speech Language Pathologist;FEES Evaluation   Follow Up SLP Evaluation Requesting a FEES diagnostic   Dysphagia Rating   Nutritional Liquid Intake Rating Scale Thickened beverages (mildly thick unless otherwise specified)   Nutritional Food Intake Rating Scale Total oral diet of a single  consistency   Patient / Family Goals   Patient / Family Goal #1 Pt unable to formulate   Short Term Goals   Short Term Goal # 1 Pt will consume a PU4/MT2 diet without s/s of aspiration with 1:1 supervision pending FEES  (Md will need to approve;has clear liquid orders; prakash drain)   Education Group   Additional Comments spouse at bedside and educated.  Pt with frequent 'yes' responses, +aphasia, apraxia, dysphagia   Interdisciplinary Plan of Care Collaboration   IDT Collaboration with  Nursing;Family / Caregiver   Patient Position at End of Therapy Seated;In Bed;Call Light within Reach;Tray Table within Reach;Family / Friend in Room;Phone within Reach   Collaboration Comments PU4/MT2 once cleared by GI Md. Needs FEES diagnostic   Session Information   Date / Session Number 1, 3/31/21 (1/5-4/6)  (Needs a FEES: RN getting order.Complex medical)   Priority 4  (5x.MetEndometrCA.+Acute CVA/apraxic/aphasic.PU4/UY3FCCO!)

## 2021-03-31 NOTE — PROGRESS NOTES
Pt HGB drop from 11.1 to 8.4 this AM. Pt has no signs of active bleeding and is neurologically unchanged. BP stable at 116/84 with slight tachycardia 103. Pt has been receiving 125mL/hr IV fluids. Dr. Sahni notified and no new orders to implement at this time. Repeat H/H will be order later this AM. Will continue to closely monitor for signs and symptoms of active bleed.

## 2021-03-31 NOTE — PROGRESS NOTES
[facility]ESS [facility]     Attending:   Dr. Noble    Resident:   Jany Nuno MD    PATIENT: Graciela Desai; [id number]; 1950    ID: 71 y.o. female admitted for uterine mass, suspected malignancy, now status post total abdominal hysterectomy with salpingo-oophorectomy, periaortic and pelvic lymph node dissection, omentectomy, bilateral ureterolysis with peritoneal biopsies, cystoscopy repair and bilateral ureteral stent placement on 3/29/2021    SUBJECTIVE: Patient transferred to telemetry due to new onset confusion, aphasia, found to be due to new moderate-sized left middle cerebral artery acute infarct.  Patient remains with expressive aphasia today.  Able to speak few words, but with visible difficulty.  Unable to follow all commands, and question whether she is processing commands, though she does seem aware of the situation.  States she is hungry.  Pain well controlled.    OBJECTIVE:     Vitals:    03/31/21 0001 03/31/21 0411 03/31/21 0722 03/31/21 1129   BP: 142/67 116/84 126/75 123/65   Pulse: (!) 102 (!) 103 (!) 108 97   Resp: 18 18 18 16   Temp: 36.6 °C (97.8 °F) 36.4 °C (97.6 °F) 36.4 °C (97.5 °F) 37.4 °C (99.3 °F)   TempSrc: Temporal Temporal Temporal Temporal   SpO2: 96% 96% 94% 99%   Weight:       Height:           Intake/Output Summary (Last 24 hours) at 3/31/2021 1541  Last data filed at 3/31/2021 0700  Gross per 24 hour   Intake 2180.88 ml   Output 2780 ml   Net -599.12 ml       PE:   General: No acute distress, resting comfortably in bed.  HEENT: Normocephalic, atraumatic. EOMI. MMM  Cardiovascular: RRR with no M/R/G.  Respiratory: Symmetrical chest. Clear to auscultation bilaterally with no wheezes, rales or rhonchi  Abdomen: soft, appropriately tender, non-distended, surgical dressings in place  EXT:  Moves all extremities, grossly normal. No edema or cyanosis. 2+ pulses in all extremities  Neuro: Expressive aphasia noted with difficulty word finding.  Patient tries to follow  commands, but is not always following the appropriate command.  Unable to fully assess cranial nerve function secondary to patient unable to follow commands.  Moving all extremities,  strength equal bilaterally, 5 out of 5 strength in all 4 extremities    LABS:  Recent Labs     03/29/21 0006 03/29/21  0006 03/30/21 0005 03/31/21  0022 03/31/21  1138   WBC 14.4*  --  22.4* 13.8*  --    RBC 4.96  --  4.40 3.42*  --    HEMOGLOBIN 10.4*   < > 11.1* 8.4* 8.1*   HEMATOCRIT 33.6*   < > 34.6* 26.9* 24.8*   MCV 67.7*  --  78.6* 78.7*  --    MCH 21.0*  --  25.2* 24.6*  --    RDW 59.4*  --  73.8*  --   --    PLATELETCT 352  --  267 232  --    MPV 8.8*  --  9.4 9.8  --    NEUTSPOLYS  --   --   --  85.70*  --    LYMPHOCYTES  --   --   --  6.70*  --    MONOCYTES  --   --   --  6.80  --    EOSINOPHILS  --   --   --  0.10  --    BASOPHILS  --   --   --  0.10  --    RBCMORPHOLO  --   --   --  Present  --     < > = values in this interval not displayed.     Recent Labs     03/29/21 0006 03/30/21 0005 03/31/21  0022   SODIUM 138 138 136   POTASSIUM 3.4* 4.8 4.7   CHLORIDE 101 104 107   CO2 27 22 23   BUN 5* 11 13   CREATININE 0.58 1.17 1.01   CALCIUM 9.0 8.6 8.0*   MAGNESIUM 1.7 1.7  --    ALBUMIN  --  1.9* 2.0*     Estimated GFR/CRCL = Estimated Creatinine Clearance: 43.4 mL/min (by C-G formula based on SCr of 1.01 mg/dL).  Recent Labs     03/29/21 0006 03/30/21 0005 03/31/21  0022   GLUCOSE 106* 253* 157*     Recent Labs     03/30/21 0005 03/31/21  0022   ASTSGOT 753* 990*   ALTSGPT 584* 869*   TBILIRUBIN 0.5 0.2   ALKPHOSPHAT 86 129*   GLOBULIN 3.3 2.7   AMMONIA  --  13         Recent Labs     03/29/21  1624 03/29/21  1758   ISTATAPH 7.344* 7.233*   ISTATAPCO2 44.9* 52.6*   ISTATAPO2 231* 318*   ISTATATCO2 26 24   [id number] 100* 100*   ISTATARTHCO3 24.5 22.2   ISTATARTBE -1 -5*   ISTATTEMP 37.0 C see below   ISTATFIO2 0.9  --    ISTATSPEC Arterial Arterial   ISTATAPHTC 7.344*  --    [id number] 231*  --      No results  "for input(s): INR, APTT, FIBRINOGEN in the last 72 hours.    Invalid input(s): DIMER    MICROBIOLOGY:   Results     Procedure Component Value Units Date/Time    URINE CULTURE(NEW) [[id number]] Collected: 03/27/21 1329    Order Status: Completed Specimen: Urine, Clean Catch Updated: 03/29/21 0832     Significant Indicator NEG     Source UR     Site URINE, CLEAN CATCH     Culture Result Usual urogenital marisa 10-50,000 cfu/mL    Narrative:      Indication for culture:->Patient WITHOUT an indwelling Beverly  catheter in place with new onset of Dysuria, Frequency,  Urgency, and/or Suprapubic pain  Indication for culture:->Patient WITHOUT an indwelling Beverly    URINALYSIS [[id number]]  (Abnormal) Collected: 03/27/21 1329    Order Status: Completed Specimen: Urine, Clean Catch Updated: 03/27/21 1359     Color Yellow     Character Cloudy     Specific Gravity 1.009     Ph 5.5     Glucose Negative mg/dL      Ketones Negative mg/dL      Protein 100 mg/dL      Bilirubin Negative     Urobilinogen, Urine 0.2     Nitrite Negative     Leukocyte Esterase Moderate     Occult Blood Large     Micro Urine Req Microscopic    Narrative:      Indication for culture:->Patient WITHOUT an indwelling Beverly  catheter in place with new onset of Dysuria, Frequency,  Urgency, and/or Suprapubic pain    SARS-CoV-2 PCR (24 hour In-House): Collect NP swab in [facility] [[id number]] Collected: 03/25/21 2340    Order Status: Completed Specimen: Respirate from Nasopharyngeal Updated: 03/26/21 1035     SARS-CoV-2 Source NP Swab     SARS-CoV-2 by PCR NotDetected     Comment: PATIENTS: Important information regarding your results and instructions can  be found at [email or web address]   \"After your  Covid-19 Test\"  [facility] providers: PLEASE REFER TO DE-ESCALATION AND RETESTING PROTOCOL  on [email or web address]  **The TaqPath COVID-19 SARS-CoV-2 RT-PCR test has been made available for  use under the Emergency Use Authorization (EUA) only.         " Narrative:      Have you been in close contact with a person who is suspected  or known to be positive for COVID-19 within the last 30 days  (e.g. last seen that person < 30 days ago)->Unknown            IMAGING:   MR-BRAIN-WITH & W/O   Final Result      1.  Moderate-sized left middle cerebral artery acute infarct with possible early hemorrhagic transformation.   2.  Scattered punctate and small acute infarcts involving the bilateral parieto-occipital regions and left cerebellar hemisphere.   3.  There is a pattern of mild supratentorial white matter disease with scattered foci of bright T2 and FLAIR signal in the subcortical and deep white matter of both hemispheres consistent with small vessel ischemic change versus demyelination or    gliosis. Mild diffuse cerebral substance loss.   4.  Mild microangiopathic ischemic change versus demyelination or gliosis.      Attempts to convey these findings to Dr. HERNANDEZ PALOMO were initiated on 3/31/2021 10:44 AM. Findings were discussed with Dr. Palomo on 3/31/2021 at 1056 hours.      CT-CEREBRAL PERFUSION ANALYSIS   Final Result      1.  Normal study. Cerebral blood flow less than 30% likely representing completed infarct = 0 mL.      2.  T Max more than 6 seconds likely representing combination of completed infarct and ischemia = 0 mL.      3.  Mismatched volume likely representing ischemic brain/penumbra = None      4.  Please note that the cerebral perfusion was performed on the limited brain tissue around the basal ganglia region. Infarct/ischemia outside the CT perfusion sections can be missed in this study.      CT-CTA NECK WITH & W/O-POST PROCESSING   Final Result      Beaded appearance of the cervical carotid and vertebral arteries most suggestive of fibromuscular dysplasia.      No significant stenosis or dissection.      Lytic lesion in the C7 vertebral body and the left frontal calvarium. Malignancy is concerning and must be excluded.      CT-CTA HEAD  WITH & W/O-POST PROCESS   Final Result      Acute/subacute left MCA infarct.      No large vessel occlusion or hemodynamically significant stenosis.      Small lytic lesion in the left frontal calvaria concerning for malignancy.      [id number] VIEW   Final Result         1.  Nonspecific bowel gas pattern.      US-EXTREMITY VENOUS LOWER BILAT   Final Result      MR-PELVIS-WITH & W/O AND SEQUENCES   Final Result         1. Stage IV endometrial cancer. The endometrial mass itself measures 13 cm, invading through the anterior uterine wall and uterine fundus.   2. Metastatic parametrial, external iliac and para-aortic lymphadenopathy.   3. Multiple pelvic osseous metastases.   4. Several uterine fibroids.            US-PELVIC TRANSABDOMINAL ONLY   Final Result      Enlarged, heterogeneous uterus could indicate underlying fibroids though malignancy cannot be excluded.      Endometrial stripe not visualized.      CT-CTA COMPLETE THORACOABDOMINAL AORTA   Final Result      No evidence of aortic aneurysm or dissection.      Partial visualization of a markedly enlarged heterogeneous uterus with hypervascularity concerning for malignancy versus fibroids.      Diverticulosis without evidence of diverticulitis.      Bilateral hydronephrosis and hydroureter likely related to enlarged uterus.      Nonobstructive left renal stone.      Bilateral pulmonary nodules measuring up to 5 mm.      Nodule recommendations:   Low Risk: No routine follow-up      High Risk: Optional CT at 12 months      Comments: Use most suspicious nodule as guide to management. Follow-up intervals may vary according to size and risk.      Low Risk - Minimal or absent history of smoking and of other known risk factors.      High Risk - History of smoking or of other known risk factors.      Note: These recommendations do not apply to lung cancer screening, patients with immunosuppression, or patients with known primary cancer.      Fleischner Society 2017  Guidelines for Management of Incidentally Detected Pulmonary Nodules in Adults      DX-CHEST-PORTABLE (1 VIEW)   Final Result      No acute cardiac or pulmonary abnormalities are identified.      EC-ECHOCARDIOGRAM COMPLETE W/ CONT    (Results Pending)       MEDS:  Current Facility-Administered Medications   Medication Last Admin   • Pharmacy Consult Request ...Pain Management Review 1 Each     • ondansetron (ZOFRAN) syringe/vial injection 4 mg     • NS (BOLUS) infusion 500 mL     • dextrose 5 % and 0.45 % NaCl with KCl 20 mEq New Bag at 03/31/21 0633   • prochlorperazine (COMPAZINE) injection 10 mg     • ketorolac (TORADOL) injection 15 mg     • morphine 1 mg/mL in 50 mL (PCA) Rate Verify at 03/31/21 0600   • senna-docusate (PERICOLACE or SENOKOT S) 8.6-50 MG per tablet 2 tablet 2 tablet at 03/31/21 0532    And   • polyethylene glycol/lytes (MIRALAX) PACKET 1 Packet      And   • magnesium hydroxide (MILK OF MAGNESIA) suspension 30 mL      And   • bisacodyl (DULCOLAX) suppository 10 mg     • ondansetron (ZOFRAN ODT) dispertab 4 mg     • [Held by provider] amLODIPine (NORVASC) tablet 5 mg Stopped at 03/30/21 1800         ASSESSMENT/PLAN:   71-year-old female with longstanding history of irregular vaginal bleeding and suspected uterine malignancy, admitted for concern for uterine malignancy now status post total abdominal hysterectomy with salpingo-oophorectomy, periaortic and pelvic lymph node dissection, omentectomy, bilateral ureterolysis with peritoneal biopsies, cystoscopy repair and bilateral ureteral stent placement on 3/29/2021 transfer to telemetry on 3/30/2021 following development of confusion and aphasia suspicious for new stroke.    #Metastatic endometrial cancer  Status post stents of surgery 3/29/2021.  Evidence of metastasis to omentum, likely lymph nodes, possible bone as well as on brain imaging lesions seen in C7 vertebral body as well as left frontal calvaria.  -Further recommendations per  Gyn/Onc  -We will discuss palliative consult with patient's partner Star henderson able    #Acute left MCA infarct  Patient with development of expressive aphasia and confusion on 3/30/2021 with subsequent findings of acute infarction.  Per neurology note, pattern of infarct suspicious for cardioembolic phenomenon possibly related to hypercoagulability due to underlying cancer.  -This afternoon, patient developed worsening confusion, aphasia, word repetition, and lack of comprehension  -Repeat CT head without contrast ordered to rule out bleeding into area of infarct  -PT/OT/speech ordered  -As above, overall prognosis is poor and would like to involve palliative care    #Acute transaminitis  Patient with acute rise in transaminases, suspect due to shock liver  Ammonia level normal  -We will hold statin, Tylenol, hold all other hepatotoxins  -Continue to monitor    #Iron deficiency anemia due to chronic blood loss  Continue to monitor, transfuse as indicated for hemoglobin less than 7    #Hypokalemia  Continue to monitor, replace as indicated    #Hyperlipidemia  -Holding statin as above    #Prediabetes  #Essential hypertension  -We will add ISS  -Holding home amlodipine    Core Measures:  DVT PPX: SCDs  ABX: None  Lines: PIV  Fluids: D5 1/2 NS with 20meq KCl at 125ml/hr  PCP: Shan Tuttle MD  CODE STATUS: Full    Dispo: Inpatient

## 2021-03-31 NOTE — PROGRESS NOTES
Pt transported to Telemetry floor with all belongings. Pt placed on telemetry and VS taken, stable at this time. Drips verified and orders released. Skin check performed with UMM Low. Noted midline abdominal surgical incision, and prakash drain to left abdomen. See LDAs for further description. Pt does not report pain at this time. Pt oriented to room and surroundings, call light provided, bed in lowest position with wheels locked.

## 2021-03-31 NOTE — PROGRESS NOTES
Neurology Progress Note  Neurohospitalist Service, [facility] Neurosciences    Referring Physician: Luís Diaz M.D.    Chief Complaint   Patient presents with   • Chest Pain     mid epigastric   • Back Pain     radiating from right scapula to lower back       HPI: Refer to initial documented Neurology H&P, as detailed in the patient's chart.    Interval History March 31, 2021: No new events overnight. Patient remains to be aphasic.    Review of systems: In addition to what is detailed in the HPI and/or updated in the interval history, all other systems reviewed and are negative.    Past Medical History:    has a past medical history of Hypertension.    FHx:  family history includes Alcohol/Drug in her mother; Heart Disease in her mother; No Known Problems in her father.    SHx:   reports that she quit smoking about 24 years ago. Her smoking use included cigarettes. She started smoking about 25 years ago. She has a 2.00 pack-year smoking history. She has never used smokeless tobacco. She reports that she does not drink alcohol and does not use drugs.    Medications:    Current Facility-Administered Medications:   •  Pharmacy Consult Request ...Pain Management Review 1 Each, 1 Each, Other, PHARMACY TO DOSE, Shonda Quinteros M.D.  •  ondansetron (ZOFRAN) syringe/vial injection 4 mg, 4 mg, Intravenous, Q6HRS PRN, Shonda Quinteros M.D.  •  NS (BOLUS) infusion 500 mL, 500 mL, Intravenous, Once PRN **AND** Notify Provider, , , Once, Shonda Quinteros M.D.  •  dextrose 5 % and 0.45 % NaCl with KCl 20 mEq, , Intravenous, Continuous, Shonda Quinteros M.D., Last Rate: 125 mL/hr at 03/31/21 0633, New Bag at 03/31/21 0633  •  prochlorperazine (COMPAZINE) injection 10 mg, 10 mg, Intravenous, Q6HRS PRN, Shonda Quinteros M.D.  •  famotidine (PEPCID) tablet 20 mg, 20 mg, Oral, DAILY, 20 mg at 03/31/21 0532 **OR** famotidine (PEPCID) injection 20 mg, 20 mg, Intravenous, DAILY, Shonda Quinteros M.D.  •  ketorolac  (TORADOL) injection 15 mg, 15 mg, Intravenous, Q6HRS PRN, Shonda Quinteros M.D.  •  morphine 1 mg/mL in 50 mL (PCA), , Intravenous, Continuous, ZOHAIB Shahid, Rate Verify at 03/31/21 0600  •  pravastatin (PRAVACHOL) tablet 40 mg, 40 mg, Oral, DAILY, Hakeem Maddox M.D., 40 mg at 03/31/21 0532  •  senna-docusate (PERICOLACE or SENOKOT S) 8.6-50 MG per tablet 2 tablet, 2 tablet, Oral, BID, 2 tablet at 03/31/21 0532 **AND** polyethylene glycol/lytes (MIRALAX) PACKET 1 Packet, 1 Packet, Oral, QDAY PRN **AND** magnesium hydroxide (MILK OF MAGNESIA) suspension 30 mL, 30 mL, Oral, QDAY PRN **AND** bisacodyl (DULCOLAX) suppository 10 mg, 10 mg, Rectal, QDAY PRN, Jewel Sawyer M.D.  •  acetaminophen (Tylenol) tablet 650 mg, 650 mg, Oral, Q6HRS PRN, Jewel Sawyer M.D., 650 mg at 03/26/21 1324  •  ondansetron (ZOFRAN ODT) dispertab 4 mg, 4 mg, Oral, Q4HRS PRN, Jewel Sawyer M.D.  •  [Held by provider] amLODIPine (NORVASC) tablet 5 mg, 5 mg, Oral, Q DAY, Jewel Sawyer M.D., Stopped at 03/30/21 1800    Physical Examination:     Vitals:    03/30/21 2112 03/31/21 0001 03/31/21 0411 03/31/21 0722   BP: 114/60 142/67 116/84 126/75   Pulse: (!) 106 (!) 102 (!) 103 (!) 108   Resp: 18 18 18 18   Temp: 36.6 °C (97.9 °F) 36.6 °C (97.8 °F) 36.4 °C (97.6 °F) 36.4 °C (97.5 °F)   TempSrc: Temporal Temporal Temporal Temporal   SpO2: 96% 96% 96% 94%   Weight:       Height:           General: Patient is awake and in no acute distress  Eyes: examination of optic disks not indicated at this time  CV: RRR    NEUROLOGICAL EXAM:     Mental status: Awake, alert and fully oriented, follows commands  Speech and language: Speech is very slurred. She does follow commands. She can tell me her name. However she has difficulty naming objects. Cannot repeat. Severe expressive aphasia.  Cranial nerve exam: Pupils are equal, round and reactive to light bilaterally. Visual fields are full. Extraocular muscles are intact.  Sensation in the face is intact to light touch. Face is symmetric. Hearing to finger rub equal. Palate elevates symmetrically. Shoulder shrug is full. Tongue is midline.  Motor exam: Strength is 5/5 in all extremities both distally and proximally. Tone is normal. No abnormal movements were seen on exam.  Sensory exam: No sensory deficits identified   Deep tendon reflexes:  2+ and symmetric. Toes down-going bilaterally.  Coordination: no ataxia       Objective Data:    Labs:  Lab Results   Component Value Date/Time    PROTHROMBTM 14.2 03/27/2021 03:30 AM    INR 1.07 03/27/2021 03:30 AM      Lab Results   Component Value Date/Time    WBC 13.8 (H) 03/31/2021 12:22 AM    RBC 3.42 (L) 03/31/2021 12:22 AM    HEMOGLOBIN 8.4 (L) 03/31/2021 12:22 AM    HEMATOCRIT 26.9 (L) 03/31/2021 12:22 AM    MCV 78.7 (L) 03/31/2021 12:22 AM    MCH 24.6 (L) 03/31/2021 12:22 AM    MCHC 31.2 (L) 03/31/2021 12:22 AM    MPV 9.8 03/31/2021 12:22 AM    NEUTSPOLYS 85.70 (H) 03/31/2021 12:22 AM    LYMPHOCYTES 6.70 (L) 03/31/2021 12:22 AM    MONOCYTES 6.80 03/31/2021 12:22 AM    EOSINOPHILS 0.10 03/31/2021 12:22 AM    BASOPHILS 0.10 03/31/2021 12:22 AM    HYPOCHROMIA 1+ 03/25/2021 09:50 PM    ANISOCYTOSIS 2+ (A) 03/31/2021 12:22 AM      Lab Results   Component Value Date/Time    SODIUM 136 03/31/2021 12:22 AM    POTASSIUM 4.7 03/31/2021 12:22 AM    CHLORIDE 107 03/31/2021 12:22 AM    CO2 23 03/31/2021 12:22 AM    GLUCOSE 157 (H) 03/31/2021 12:22 AM    BUN 13 03/31/2021 12:22 AM    CREATININE 1.01 03/31/2021 12:22 AM      Lab Results   Component Value Date/Time    CHOLSTRLTOT 204 (H) 01/30/2020 09:44 AM     (H) 01/30/2020 09:44 AM    HDL 60 01/30/2020 09:44 AM    TRIGLYCERIDE 108 01/30/2020 09:44 AM       Lab Results   Component Value Date/Time    ALKPHOSPHAT 129 (H) 03/31/2021 12:22 AM    ASTSGOT 990 (H) 03/31/2021 12:22 AM    ALTSGPT 869 (H) 03/31/2021 12:22 AM    TBILIRUBIN 0.2 03/31/2021 12:22 AM        Imaging/Testing:  MR-BRAIN-WITH &  W/O   Final Result      1.  Moderate-sized left middle cerebral artery acute infarct with possible early hemorrhagic transformation.   2.  Scattered punctate and small acute infarcts involving the bilateral parieto-occipital regions and left cerebellar hemisphere.   3.  There is a pattern of mild supratentorial white matter disease with scattered foci of bright T2 and FLAIR signal in the subcortical and deep white matter of both hemispheres consistent with small vessel ischemic change versus demyelination or    gliosis. Mild diffuse cerebral substance loss.   4.  Mild microangiopathic ischemic change versus demyelination or gliosis.      Attempts to convey these findings to Dr. HERNANDEZ PALOMO were initiated on 3/31/2021 10:44 AM. Findings were discussed with Dr. Palomo on 3/31/2021 at 1056 hours.      CT-CEREBRAL PERFUSION ANALYSIS   Final Result      1.  Normal study. Cerebral blood flow less than 30% likely representing completed infarct = 0 mL.      2.  T Max more than 6 seconds likely representing combination of completed infarct and ischemia = 0 mL.      3.  Mismatched volume likely representing ischemic brain/penumbra = None      4.  Please note that the cerebral perfusion was performed on the limited brain tissue around the basal ganglia region. Infarct/ischemia outside the CT perfusion sections can be missed in this study.      CT-CTA NECK WITH & W/O-POST PROCESSING   Final Result      Beaded appearance of the cervical carotid and vertebral arteries most suggestive of fibromuscular dysplasia.      No significant stenosis or dissection.      Lytic lesion in the C7 vertebral body and the left frontal calvarium. Malignancy is concerning and must be excluded.      CT-CTA HEAD WITH & W/O-POST PROCESS   Final Result      Acute/subacute left MCA infarct.      No large vessel occlusion or hemodynamically significant stenosis.      Small lytic lesion in the left frontal calvaria concerning for malignancy.       [id number] VIEW   Final Result         1.  Nonspecific bowel gas pattern.      US-EXTREMITY VENOUS LOWER BILAT   Final Result      MR-PELVIS-WITH & W/O AND SEQUENCES   Final Result         1. Stage IV endometrial cancer. The endometrial mass itself measures 13 cm, invading through the anterior uterine wall and uterine fundus.   2. Metastatic parametrial, external iliac and para-aortic lymphadenopathy.   3. Multiple pelvic osseous metastases.   4. Several uterine fibroids.            US-PELVIC TRANSABDOMINAL ONLY   Final Result      Enlarged, heterogeneous uterus could indicate underlying fibroids though malignancy cannot be excluded.      Endometrial stripe not visualized.      CT-CTA COMPLETE THORACOABDOMINAL AORTA   Final Result      No evidence of aortic aneurysm or dissection.      Partial visualization of a markedly enlarged heterogeneous uterus with hypervascularity concerning for malignancy versus fibroids.      Diverticulosis without evidence of diverticulitis.      Bilateral hydronephrosis and hydroureter likely related to enlarged uterus.      Nonobstructive left renal stone.      Bilateral pulmonary nodules measuring up to 5 mm.      Nodule recommendations:   Low Risk: No routine follow-up      High Risk: Optional CT at 12 months      Comments: Use most suspicious nodule as guide to management. Follow-up intervals may vary according to size and risk.      Low Risk - Minimal or absent history of smoking and of other known risk factors.      High Risk - History of smoking or of other known risk factors.      Note: These recommendations do not apply to lung cancer screening, patients with immunosuppression, or patients with known primary cancer.      Fleischner Society 2017 Guidelines for Management of Incidentally Detected Pulmonary Nodules in Adults      DX-CHEST-PORTABLE (1 VIEW)   Final Result      No acute cardiac or pulmonary abnormalities are identified.      EC-ECHOCARDIOGRAM COMPLETE W/ CONT     (Results Pending)         Assessment and Plan:    71-year-old female who underwent surgery 2 days ago for extensive metastatic endometrial cancer. Yesterday morning she woke up being confused. On examiner she had expressive aphasia. CTA head and neck was obtained which showed a acute to subacute left frontal infarct. CTA were unremarkable. Etiology of the infarct is unknown. However given the cancer history she may be hypercoagulable. There is also signs of metastatic cancer with 2 lytic lesions one skull and one at C7 on the CT scans.    Plan:  1. Start aspirin 81 mg daily if okay per medicine/OB.  2. Waiting on MRI brain with and without contrast.  3. Blood pressure well controlled. No need for permissive hypertension. I do not think the patient is perfusion dependent.  4. Glucose control per primary recommendations normoglycemia  5. PT/OT/speech      Given the cancer diagnosis we may need to consider getting palliative  care consult to get clarification of goals of care.      Update.      MRI brain shows acute infarcts in the left distal MCA territory moderate size.  Some petechia hemorrhagic transformation.  There is also smaller infarcts in bilateral occipital areas.  This pattern is highly suspicious of a cardioembolic phenomenon.  Patient could be hypercoagulable due to the underlying cancer.    The evaluation of the patient, and recommended management, was discussed with the resident staff. I have performed a physical exam and reviewed and updated ROS and Plan today (3/31/2021). In review of yesterday's note (3/30/2021), there are no changes except as documented above.    This chart was partially generated using voice recognition technology and sound alike word replacement may be present, best efforts were made to make the chart accurate.    Clem Palomo MD  Board Certified Neurology, ABPN  (t) [phone]

## 2021-03-31 NOTE — PROGRESS NOTES
Pt. Was cleared by Dr. Gallagher to proceed to MRI. Surgical clips in abdomen seen; pt is safe to proceed to MRI tomorrow.

## 2021-03-31 NOTE — CARE PLAN
Problem: Communication  Goal: The ability to communicate needs accurately and effectively will improve  Outcome: PROGRESSING AS EXPECTED     Problem: Pain Management  Goal: Pain level will decrease to patient's comfort goal  Outcome: PROGRESSING AS EXPECTED     Problem: Infection  Goal: Will remain free from infection  Outcome: PROGRESSING AS EXPECTED     Problem: Venous Thromboembolism (VTW)/Deep Vein Thrombosis (DVT) Prevention:  Goal: Patient will participate in Venous Thrombosis (VTE)/Deep Vein Thrombosis (DVT)Prevention Measures  Outcome: PROGRESSING AS EXPECTED     Problem: Psychosocial Needs:  Goal: Level of anxiety will decrease  Outcome: PROGRESSING AS EXPECTED     Problem: Fluid Volume:  Goal: Will maintain balanced intake and output  Outcome: PROGRESSING AS EXPECTED     Problem: Respiratory:  Goal: Respiratory status will improve  Outcome: PROGRESSING AS EXPECTED     Problem: Bowel/Gastric:  Goal: Normal bowel function is maintained or improved  Outcome: PROGRESSING AS EXPECTED  Goal: Will not experience complications related to bowel motility  Outcome: PROGRESSING AS EXPECTED     Problem: Urinary Elimination:  Goal: Ability to reestablish a normal urinary elimination pattern will improve  Outcome: PROGRESSING AS EXPECTED     Problem: Skin Integrity  Goal: Risk for impaired skin integrity will decrease  Outcome: PROGRESSING AS EXPECTED     Problem: Mobility  Goal: Risk for activity intolerance will decrease  Outcome: PROGRESSING AS EXPECTED     Problem: Medication  Goal: Compliance with prescribed medication will improve  Outcome: PROGRESSING AS EXPECTED     Problem: Knowledge Deficit  Goal: Knowledge of disease process/condition, treatment plan, diagnostic tests, and medications will improve  Outcome: PROGRESSING AS EXPECTED  Goal: Knowledge of the prescribed therapeutic regimen will improve  Outcome: PROGRESSING AS EXPECTED     Problem: Discharge Barriers/Planning  Goal: Patient's continuum of care  needs will be met  Outcome: PROGRESSING AS EXPECTED

## 2021-03-31 NOTE — PROGRESS NOTES
1045: Aphasia and confusion post op. Ciara MORRIS notified; placed order to decrease morphine PCA basal rate.  Around 1500: Confusion and aphasia/dysarthria persistent since PCA decrease. Ciara MORRIS notified; recommended CTA for concern of brain metastases. Orders to follow.  Order received at 1532 for CTA. Received call from CT stating need for new IV and contrast consent. Patient not consentable at this time and no next of kin. Spoke with Ciara MORRIS and charge RN about how to obtain consent. Charge RN spoke with NAM who stated 2 physician consent required. Updated Dr. Cleveland; Dr. Cleveland contacted Dr. Tanner for second consent. Still awaiting USG IV placement. Dr. Tanner came to bedside concerned STAT CT had not been completed, communicated with Dr. Tanner regarding barriers.  1830: USG IV placed and patient placed on transport by CT.   1850: PT down with transport to CT in hospital bed with IV and morphine PCA.

## 2021-03-31 NOTE — PROGRESS NOTES
"Surgical Progress Note    Author: Shonda Quinteros M.D. Date & Time created: 3/31/2021   3:21 PM     Interval Events:  Continues aphasic w/ limited comprehension. Boyfriend Star at  with patient. Denies pain. Tolerating CLD. Voiding to dunn.     Hemodynamics:  Temp (24hrs), Av.6 °C (97.9 °F), Min:36.3 °C (97.3 °F), Max:37.4 °C (99.3 °F)  Temperature: 37.4 °C (99.3 °F)  Pulse  Av.5  Min: 73  Max: 108   Blood Pressure : 123/65     Respiratory:    Respiration: 16, Pulse Oximetry: 99 %        RUL Breath Sounds: Clear, RML Breath Sounds: Clear, RLL Breath Sounds: Diminished, KAREN Breath Sounds: Clear, LLL Breath Sounds: Diminished  Neuro:  GCS Total Stephanie Coma Score: 13     Fluids:    Intake/Output Summary (Last 24 hours) at 3/31/2021 1521  Last data filed at 3/31/2021 0700  Gross per 24 hour   Intake 2180.88 ml   Output 2780 ml   Net -599.12 ml        Current Diet Order   Procedures   • Diet Order Diet: Clear Liquid     Physical Exam   Constitutional: She appears well-developed.   HENT:   Head: Normocephalic.   Cardiovascular: Normal rate.   Pulmonary/Chest: Effort normal.   Abdominal: Soft. She exhibits no distension. There is no abdominal tenderness.   VML incision c/d/i w/ SQ suture, LLQ drain w/ SS output   Genitourinary:    Genitourinary Comments: Dunn draining blood tinged urine     Musculoskeletal:         General: Edema present.      Comments: Moving all four extremities   Neurological: She is alert.   Responds \"yes\" to all questions but cannot carry conversation and understanding appears limited     Labs:  Recent Results (from the past 24 hour(s))   CBC with Differential    Collection Time: 21 12:22 AM   Result Value Ref Range    WBC 13.8 (H) 4.8 - 10.8 K/uL    RBC 3.42 (L) 4.20 - 5.40 M/uL    Hemoglobin 8.4 (L) 12.0 - 16.0 g/dL    Hematocrit 26.9 (L) 37.0 - 47.0 %    MCV 78.7 (L) 81.4 - 97.8 fL    MCH 24.6 (L) 27.0 - 33.0 pg    MCHC 31.2 (L) 33.6 - 35.0 g/dL    Platelet Count 232 164 - " 446 K/uL    MPV 9.8 9.0 - 12.9 fL    Neutrophils-Polys 85.70 (H) 44.00 - 72.00 %    Lymphocytes 6.70 (L) 22.00 - 41.00 %    Monocytes 6.80 0.00 - 13.40 %    Eosinophils 0.10 0.00 - 6.90 %    Basophils 0.10 0.00 - 1.80 %    Immature Granulocytes 0.60 0.00 - 0.90 %    Nucleated RBC 0.00 /100 WBC    Neutrophils (Absolute) 11.83 (H) 2.00 - 7.15 K/uL    Lymphs (Absolute) 0.92 (L) 1.00 - 4.80 K/uL    Monos (Absolute) 0.94 (H) 0.00 - 0.85 K/uL    Eos (Absolute) 0.01 0.00 - 0.51 K/uL    Baso (Absolute) 0.02 0.00 - 0.12 K/uL    Immature Granulocytes (abs) 0.08 0.00 - 0.11 K/uL    NRBC (Absolute) 0.00 K/uL    Anisocytosis 2+ (A)     Microcytosis 2+ (A)    Comp Metabolic Panel    Collection Time: 03/31/21 12:22 AM   Result Value Ref Range    Sodium 136 135 - 145 mmol/L    Potassium 4.7 3.6 - 5.5 mmol/L    Chloride 107 96 - 112 mmol/L    Co2 23 20 - 33 mmol/L    Anion Gap 6.0 (L) 7.0 - 16.0    Glucose 157 (H) 65 - 99 mg/dL    Bun 13 8 - 22 mg/dL    Creatinine 1.01 0.50 - 1.40 mg/dL    Calcium 8.0 (L) 8.5 - 10.5 mg/dL    AST(SGOT) 990 (H) 12 - 45 U/L    ALT(SGPT) 869 (H) 2 - 50 U/L    Alkaline Phosphatase 129 (H) 30 - 99 U/L    Total Bilirubin 0.2 0.1 - 1.5 mg/dL    Albumin 2.0 (L) 3.2 - 4.9 g/dL    Total Protein 4.7 (L) 6.0 - 8.2 g/dL    Globulin 2.7 1.9 - 3.5 g/dL    A-G Ratio 0.7 g/dL   AMMONIA    Collection Time: 03/31/21 12:22 AM   Result Value Ref Range    Ammonia 13 11 - 45 umol/L   ESTIMATED GFR    Collection Time: 03/31/21 12:22 AM   Result Value Ref Range    GFR If African American >60 >60 mL/min/1.73 m 2    GFR If Non African American 54 (A) >60 mL/min/1.73 m 2   PERIPHERAL SMEAR REVIEW    Collection Time: 03/31/21 12:22 AM   Result Value Ref Range    Peripheral Smear Review see below    PLATELET ESTIMATE    Collection Time: 03/31/21 12:22 AM   Result Value Ref Range    Plt Estimation Normal    MORPHOLOGY    Collection Time: 03/31/21 12:22 AM   Result Value Ref Range    RBC Morphology Present     Polychromia 2+      Poikilocytosis 2+     Target Cells 1+     Tear Drop Cells 1+     Echinocytes 2+    DIFFERENTIAL COMMENT    Collection Time: 03/31/21 12:22 AM   Result Value Ref Range    Comments-Diff see below    HEMOGLOBIN AND HEMATOCRIT    Collection Time: 03/31/21 11:38 AM   Result Value Ref Range    Hemoglobin 8.1 (L) 12.0 - 16.0 g/dL    Hematocrit 24.8 (L) 37.0 - 47.0 %     Medical Decision Making, by Problem:  Active Hospital Problems    Diagnosis    • Metastatic endometrial cancer (HCC) [C54.1]      Priority: High   • Constipation [K59.00]      Priority: Medium   • Iron deficiency anemia due to chronic blood loss [D50.0]      Priority: Medium   • Hypokalemia [E87.6]      Priority: Low   • Hyponatremia [E87.1]      Priority: Low   • Peripheral edema [R60.9]      Priority: Low   • Hyperlipidemia [E78.5]      Priority: Low   • Essential hypertension [I10]      Priority: Low   • Prediabetes [R73.03]      Priority: Low   • Confusion [R41.0]      Plan:  70 y/o found to have a endometrial mass now s/p MACKENZIE/BSO/PALND/PLND/omentectomy/bilateral uretrolysis/peritoneal biopsies/cystotomy repair/bilateral ureteral stent placement on 3/29/21     1. POD #2: found to have an enlarged 18 week uterus with serosal tumor involvement and peritoneal disease with densly adherent bladder. clear liquid diet w/ advancement pending bowel fxn and SLP eval, KATIUSKA drain serosang, encourage ambulation, Needs stents x6 weeks, dunn x3 weeks.   2. Expressive aphasia: L MCA infarct, cardiothrombotic event, appreciate neurology assistance, repeat CTH w/ no change today, ok to start ASA tomorrow am if hg stable  3. Post op pain: MS PCA demand only to limit sedation  4. Intraop anemia: s/p 2 units intraop, hg 11 > 8  today, CTM  5. Leukocytosis: afebrile, likely reactive, recheck in am  6. Transaminitis: persistent, NH4 wnl, suspect d/t shock, no tylenol  7. Endometrial mass: surgical findings and details d/w patient and boyfriend in detail today, awaiting final  path however clinical c/w carcinoma introperatively, further treatment will depend upon recovery course  8. Dysphagia: s/p SLP consult, FEES study when able  9. GI/FEN: CLD, monitor lytes and replete PRN  10. Ppx: SCDs, start lovenox ppx in AM if Hgb stable  11. Dispo: continue inpatient management for postop care and stroke eval

## 2021-03-31 NOTE — CARE PLAN
Problem: Pain Management  Goal: Pain level will decrease to patient's comfort goal  Outcome: PROGRESSING AS EXPECTED     Problem: Communication  Goal: The ability to communicate needs accurately and effectively will improve  Outcome: PROGRESSING SLOWER THAN EXPECTED     Problem: Safety  Goal: Will remain free from injury  Outcome: MET  Goal: Will remain free from falls  Outcome: MET

## 2021-04-01 ENCOUNTER — APPOINTMENT (OUTPATIENT)
Dept: CARDIOLOGY | Facility: MEDICAL CENTER | Age: 71
DRG: 739 | End: 2021-04-01
Attending: PSYCHIATRY & NEUROLOGY
Payer: MEDICARE

## 2021-04-01 DIAGNOSIS — I63.9 CEREBROVASCULAR ACCIDENT (CVA), UNSPECIFIED MECHANISM (HCC): ICD-10-CM

## 2021-04-01 LAB
ALBUMIN SERPL BCP-MCNC: 2.1 G/DL (ref 3.2–4.9)
ALBUMIN/GLOB SERPL: 0.7 G/DL
ALP SERPL-CCNC: 157 U/L (ref 30–99)
ALT SERPL-CCNC: 515 U/L (ref 2–50)
ANION GAP SERPL CALC-SCNC: 7 MMOL/L (ref 7–16)
AST SERPL-CCNC: 307 U/L (ref 12–45)
BASOPHILS # BLD AUTO: 0.2 % (ref 0–1.8)
BASOPHILS # BLD: 0.04 K/UL (ref 0–0.12)
BILIRUB SERPL-MCNC: 0.3 MG/DL (ref 0.1–1.5)
BUN SERPL-MCNC: 9 MG/DL (ref 8–22)
CALCIUM SERPL-MCNC: 7.9 MG/DL (ref 8.5–10.5)
CHLORIDE SERPL-SCNC: 104 MMOL/L (ref 96–112)
CO2 SERPL-SCNC: 25 MMOL/L (ref 20–33)
CREAT SERPL-MCNC: 0.69 MG/DL (ref 0.5–1.4)
EOSINOPHIL # BLD AUTO: 0.11 K/UL (ref 0–0.51)
EOSINOPHIL NFR BLD: 0.6 % (ref 0–6.9)
GLOBULIN SER CALC-MCNC: 2.9 G/DL (ref 1.9–3.5)
GLUCOSE SERPL-MCNC: 137 MG/DL (ref 65–99)
HCT VFR BLD AUTO: 22.8 % (ref 37–47)
HCT VFR BLD AUTO: 24.6 % (ref 37–47)
HGB BLD-MCNC: 7.3 G/DL (ref 12–16)
HGB BLD-MCNC: 7.8 G/DL (ref 12–16)
IMM GRANULOCYTES # BLD AUTO: 0.17 K/UL (ref 0–0.11)
IMM GRANULOCYTES NFR BLD AUTO: 0.9 % (ref 0–0.9)
LV EJECT FRACT  99904: 55
LV EJECT FRACT MOD 2C 99903: 60.52
LV EJECT FRACT MOD 4C 99902: 61.1
LV EJECT FRACT MOD BP 99901: 60.86
LYMPHOCYTES # BLD AUTO: 1.07 K/UL (ref 1–4.8)
LYMPHOCYTES NFR BLD: 5.8 % (ref 22–41)
MCH RBC QN AUTO: 25.4 PG (ref 27–33)
MCHC RBC AUTO-ENTMCNC: 32 G/DL (ref 33.6–35)
MCV RBC AUTO: 79.4 FL (ref 81.4–97.8)
MONOCYTES # BLD AUTO: 1.12 K/UL (ref 0–0.85)
MONOCYTES NFR BLD AUTO: 6.1 % (ref 0–13.4)
NEUTROPHILS # BLD AUTO: 15.99 K/UL (ref 2–7.15)
NEUTROPHILS NFR BLD: 86.4 % (ref 44–72)
NRBC # BLD AUTO: 0.02 K/UL
NRBC BLD-RTO: 0.1 /100 WBC
PLATELET # BLD AUTO: 231 K/UL (ref 164–446)
PMV BLD AUTO: 9.5 FL (ref 9–12.9)
POTASSIUM SERPL-SCNC: 4.1 MMOL/L (ref 3.6–5.5)
PROT SERPL-MCNC: 5 G/DL (ref 6–8.2)
RBC # BLD AUTO: 2.87 M/UL (ref 4.2–5.4)
SODIUM SERPL-SCNC: 136 MMOL/L (ref 135–145)
WBC # BLD AUTO: 18.5 K/UL (ref 4.8–10.8)

## 2021-04-01 PROCEDURE — 85014 HEMATOCRIT: CPT

## 2021-04-01 PROCEDURE — 85025 COMPLETE CBC W/AUTO DIFF WBC: CPT

## 2021-04-01 PROCEDURE — 97166 OT EVAL MOD COMPLEX 45 MIN: CPT

## 2021-04-01 PROCEDURE — A9270 NON-COVERED ITEM OR SERVICE: HCPCS | Performed by: NURSE PRACTITIONER

## 2021-04-01 PROCEDURE — 700102 HCHG RX REV CODE 250 W/ 637 OVERRIDE(OP): Performed by: STUDENT IN AN ORGANIZED HEALTH CARE EDUCATION/TRAINING PROGRAM

## 2021-04-01 PROCEDURE — A9270 NON-COVERED ITEM OR SERVICE: HCPCS | Performed by: STUDENT IN AN ORGANIZED HEALTH CARE EDUCATION/TRAINING PROGRAM

## 2021-04-01 PROCEDURE — 97161 PT EVAL LOW COMPLEX 20 MIN: CPT

## 2021-04-01 PROCEDURE — 93306 TTE W/DOPPLER COMPLETE: CPT

## 2021-04-01 PROCEDURE — 700102 HCHG RX REV CODE 250 W/ 637 OVERRIDE(OP): Performed by: NURSE PRACTITIONER

## 2021-04-01 PROCEDURE — 700105 HCHG RX REV CODE 258: Performed by: STUDENT IN AN ORGANIZED HEALTH CARE EDUCATION/TRAINING PROGRAM

## 2021-04-01 PROCEDURE — 770020 HCHG ROOM/CARE - TELE (206)

## 2021-04-01 PROCEDURE — 99232 SBSQ HOSP IP/OBS MODERATE 35: CPT | Performed by: PSYCHIATRY & NEUROLOGY

## 2021-04-01 PROCEDURE — 36415 COLL VENOUS BLD VENIPUNCTURE: CPT

## 2021-04-01 PROCEDURE — 770006 HCHG ROOM/CARE - MED/SURG/GYN SEMI*

## 2021-04-01 PROCEDURE — 85018 HEMOGLOBIN: CPT

## 2021-04-01 PROCEDURE — 80053 COMPREHEN METABOLIC PANEL: CPT

## 2021-04-01 PROCEDURE — 93306 TTE W/DOPPLER COMPLETE: CPT | Mod: 26 | Performed by: INTERNAL MEDICINE

## 2021-04-01 PROCEDURE — 700111 HCHG RX REV CODE 636 W/ 250 OVERRIDE (IP): Performed by: OBSTETRICS & GYNECOLOGY

## 2021-04-01 PROCEDURE — 700117 HCHG RX CONTRAST REV CODE 255: Performed by: PSYCHIATRY & NEUROLOGY

## 2021-04-01 RX ORDER — SODIUM CHLORIDE, SODIUM LACTATE, POTASSIUM CHLORIDE, CALCIUM CHLORIDE 600; 310; 30; 20 MG/100ML; MG/100ML; MG/100ML; MG/100ML
INJECTION, SOLUTION INTRAVENOUS CONTINUOUS
Status: DISCONTINUED | OUTPATIENT
Start: 2021-04-01 | End: 2021-04-02

## 2021-04-01 RX ORDER — INSULIN GLARGINE 100 [IU]/ML
0.2 INJECTION, SOLUTION SUBCUTANEOUS EVERY EVENING
Status: DISCONTINUED | OUTPATIENT
Start: 2021-04-01 | End: 2021-04-01

## 2021-04-01 RX ORDER — AMLODIPINE BESYLATE 5 MG/1
5 TABLET ORAL
Status: DISCONTINUED | OUTPATIENT
Start: 2021-04-01 | End: 2021-04-09 | Stop reason: HOSPADM

## 2021-04-01 RX ORDER — DEXTROSE MONOHYDRATE 25 G/50ML
50 INJECTION, SOLUTION INTRAVENOUS
Status: DISCONTINUED | OUTPATIENT
Start: 2021-04-01 | End: 2021-04-01

## 2021-04-01 RX ORDER — OXYCODONE HYDROCHLORIDE AND ACETAMINOPHEN 5; 325 MG/1; MG/1
1 TABLET ORAL EVERY 6 HOURS PRN
Status: DISCONTINUED | OUTPATIENT
Start: 2021-04-01 | End: 2021-04-05

## 2021-04-01 RX ORDER — ASPIRIN 81 MG/1
81 TABLET, CHEWABLE ORAL DAILY
Status: DISCONTINUED | OUTPATIENT
Start: 2021-04-01 | End: 2021-04-01

## 2021-04-01 RX ADMIN — HUMAN ALBUMIN MICROSPHERES AND PERFLUTREN 3 ML: 10; .22 INJECTION, SOLUTION INTRAVENOUS at 12:09

## 2021-04-01 RX ADMIN — DOCUSATE SODIUM 50 MG AND SENNOSIDES 8.6 MG 2 TABLET: 8.6; 5 TABLET, FILM COATED ORAL at 05:13

## 2021-04-01 RX ADMIN — ONDANSETRON 4 MG: 2 INJECTION INTRAMUSCULAR; INTRAVENOUS at 17:05

## 2021-04-01 RX ADMIN — AMLODIPINE BESYLATE 5 MG: 5 TABLET ORAL at 18:39

## 2021-04-01 RX ADMIN — SODIUM CHLORIDE, POTASSIUM CHLORIDE, SODIUM LACTATE AND CALCIUM CHLORIDE: 600; 310; 30; 20 INJECTION, SOLUTION INTRAVENOUS at 10:05

## 2021-04-01 RX ADMIN — OXYCODONE HYDROCHLORIDE AND ACETAMINOPHEN 1 TABLET: 5; 325 TABLET ORAL at 17:05

## 2021-04-01 ASSESSMENT — COGNITIVE AND FUNCTIONAL STATUS - GENERAL
EATING MEALS: A LITTLE
WALKING IN HOSPITAL ROOM: A LOT
MOVING TO AND FROM BED TO CHAIR: A LITTLE
DRESSING REGULAR UPPER BODY CLOTHING: A LITTLE
SUGGESTED CMS G CODE MODIFIER DAILY ACTIVITY: CK
CLIMB 3 TO 5 STEPS WITH RAILING: A LOT
DRESSING REGULAR LOWER BODY CLOTHING: A LOT
HELP NEEDED FOR BATHING: A LOT
MOVING FROM LYING ON BACK TO SITTING ON SIDE OF FLAT BED: A LITTLE
MOBILITY SCORE: 17
STANDING UP FROM CHAIR USING ARMS: A LITTLE
SUGGESTED CMS G CODE MODIFIER MOBILITY: CK
DAILY ACTIVITIY SCORE: 15
TOILETING: A LOT
PERSONAL GROOMING: A LITTLE

## 2021-04-01 ASSESSMENT — ENCOUNTER SYMPTOMS
CHILLS: 0
COUGH: 0
VOMITING: 0
NAUSEA: 0
ABDOMINAL PAIN: 0
SHORTNESS OF BREATH: 0
FEVER: 0

## 2021-04-01 ASSESSMENT — ACTIVITIES OF DAILY LIVING (ADL): TOILETING: REQUIRES ASSIST

## 2021-04-01 ASSESSMENT — PAIN DESCRIPTION - PAIN TYPE
TYPE: ACUTE PAIN;SURGICAL PAIN

## 2021-04-01 ASSESSMENT — GAIT ASSESSMENTS
ASSISTIVE DEVICE: FRONT WHEEL WALKER
DEVIATION: DECREASED BASE OF SUPPORT;SHUFFLED GAIT;DECREASED HEEL STRIKE;DECREASED TOE OFF;BRADYKINETIC
DISTANCE (FEET): 12
GAIT LEVEL OF ASSIST: MINIMAL ASSIST

## 2021-04-01 ASSESSMENT — FIBROSIS 4 INDEX: FIB4 SCORE: 4.04

## 2021-04-01 ASSESSMENT — PAIN SCALES - WONG BAKER: WONGBAKER_NUMERICALRESPONSE: DOESN'T HURT AT ALL

## 2021-04-01 NOTE — PROGRESS NOTES
Neurology Progress Note  Neurohospitalist Service, [facility] Neurosciences    Referring Physician: Luís Diaz M.D.    Chief Complaint   Patient presents with   • Chest Pain     mid epigastric   • Back Pain     radiating from right scapula to lower back       HPI: Refer to initial documented Neurology H&P, as detailed in the patient's chart.    Interval History April 1 , 2021: Patient was ported to be less responsive yesterday afternoon.  Repeat CT head was done which did not show any acute changes.  This morning she is awake and alert eating on her own in the room.    Review of systems: In addition to what is detailed in the HPI and/or updated in the interval history, all other systems reviewed and are negative.    Past Medical History:    has a past medical history of Hypertension.    FHx:  family history includes Alcohol/Drug in her mother; Heart Disease in her mother; No Known Problems in her father.    SHx:   reports that she quit smoking about 24 years ago. Her smoking use included cigarettes. She started smoking about 25 years ago. She has a 2.00 pack-year smoking history. She has never used smokeless tobacco. She reports that she does not drink alcohol and does not use drugs.    Medications:    Current Facility-Administered Medications:   •  lactated ringers infusion, , Intravenous, Continuous, Jany Nuno M.D., Last Rate: 125 mL/hr at 04/01/21 1005, New Bag at 04/01/21 1005  •  haloperidol lactate (HALDOL) injection 1 mg, 1 mg, Intravenous, Q6HRS PRN, Zulema Vasquez M.D., 1 mg at 03/31/21 2206  •  Pharmacy Consult Request ...Pain Management Review 1 Each, 1 Each, Other, PHARMACY TO DOSE, Shonda Quinteros M.D.  •  ondansetron (ZOFRAN) syringe/vial injection 4 mg, 4 mg, Intravenous, Q6HRS PRN, Shonda Quinteros M.D.  •  NS (BOLUS) infusion 500 mL, 500 mL, Intravenous, Once PRN **AND** Notify Provider, , , Once, Shonda Quinteros M.D.  •  prochlorperazine (COMPAZINE) injection 10 mg,  10 mg, Intravenous, Q6HRS PRN, Shonda Quinteros M.D.  •  morphine 1 mg/mL in 50 mL (PCA), , Intravenous, Continuous, ZOHAIB Shahid, Rate Verify at 03/31/21 1922  •  senna-docusate (PERICOLACE or SENOKOT S) 8.6-50 MG per tablet 2 tablet, 2 tablet, Oral, BID, 2 tablet at 04/01/21 0513 **AND** polyethylene glycol/lytes (MIRALAX) PACKET 1 Packet, 1 Packet, Oral, QDAY PRN **AND** magnesium hydroxide (MILK OF MAGNESIA) suspension 30 mL, 30 mL, Oral, QDAY PRN **AND** bisacodyl (DULCOLAX) suppository 10 mg, 10 mg, Rectal, QDAY PRN, Jewel Sawyer M.D.  •  ondansetron (ZOFRAN ODT) dispertab 4 mg, 4 mg, Oral, Q4HRS PRN, Jewel Sawyer M.D.  •  [Held by provider] amLODIPine (NORVASC) tablet 5 mg, 5 mg, Oral, Q DAY, Jewel Sawyer M.D., Stopped at 03/30/21 1800    Physical Examination:     Vitals:    03/31/21 1953 03/31/21 2357 04/01/21 0437 04/01/21 0701   BP: 133/75 132/80 144/78 139/77   Pulse: (!) 112 (!) 116 (!) 109 (!) 107   Resp: 18 18 18 18   Temp: 36.6 °C (97.8 °F) 36.1 °C (97 °F) 36.4 °C (97.5 °F) 37.1 °C (98.8 °F)   TempSrc: Temporal Temporal Temporal Temporal   SpO2: 98% 95% 95% 99%   Weight: 67.8 kg (149 lb 7.6 oz)      Height:           General: Patient is awake and in no acute distress  Eyes: examination of optic disks not indicated at this time  CV: RRR    NEUROLOGICAL EXAM:     Mental status: Awake, alert and fully oriented, follows commands  Speech and language: Speech is very slurred. She does follow commands. She can tell me her name. However she has difficulty naming objects. Cannot repeat. Severe expressive aphasia.  Cranial nerve exam: Pupils are equal, round and reactive to light bilaterally. Visual fields are full. Extraocular muscles are intact. Sensation in the face is intact to light touch. Face is symmetric. Hearing to finger rub equal. Palate elevates symmetrically. Shoulder shrug is full. Tongue is midline.  Motor exam: Strength is 5/5 in all extremities both distally and  proximally. Tone is normal. No abnormal movements were seen on exam.  Sensory exam: No sensory deficits identified   Deep tendon reflexes:  2+ and symmetric. Toes down-going bilaterally.  Coordination: no ataxia       Objective Data:    Labs:  Lab Results   Component Value Date/Time    PROTHROMBTM 14.2 03/27/2021 03:30 AM    INR 1.07 03/27/2021 03:30 AM      Lab Results   Component Value Date/Time    WBC 18.5 (H) 04/01/2021 05:53 AM    RBC 2.87 (L) 04/01/2021 05:53 AM    HEMOGLOBIN 7.3 (L) 04/01/2021 05:53 AM    HEMATOCRIT 22.8 (L) 04/01/2021 05:53 AM    MCV 79.4 (L) 04/01/2021 05:53 AM    MCH 25.4 (L) 04/01/2021 05:53 AM    MCHC 32.0 (L) 04/01/2021 05:53 AM    MPV 9.5 04/01/2021 05:53 AM    NEUTSPOLYS 86.40 (H) 04/01/2021 05:53 AM    LYMPHOCYTES 5.80 (L) 04/01/2021 05:53 AM    MONOCYTES 6.10 04/01/2021 05:53 AM    EOSINOPHILS 0.60 04/01/2021 05:53 AM    BASOPHILS 0.20 04/01/2021 05:53 AM    HYPOCHROMIA 1+ 03/25/2021 09:50 PM    ANISOCYTOSIS 2+ (A) 03/31/2021 12:22 AM      Lab Results   Component Value Date/Time    SODIUM 136 04/01/2021 05:53 AM    POTASSIUM 4.1 04/01/2021 05:53 AM    CHLORIDE 104 04/01/2021 05:53 AM    CO2 25 04/01/2021 05:53 AM    GLUCOSE 137 (H) 04/01/2021 05:53 AM    BUN 9 04/01/2021 05:53 AM    CREATININE 0.69 04/01/2021 05:53 AM      Lab Results   Component Value Date/Time    CHOLSTRLTOT 204 (H) 01/30/2020 09:44 AM     (H) 01/30/2020 09:44 AM    HDL 60 01/30/2020 09:44 AM    TRIGLYCERIDE 108 01/30/2020 09:44 AM       Lab Results   Component Value Date/Time    ALKPHOSPHAT 157 (H) 04/01/2021 05:53 AM    ASTSGOT 307 (H) 04/01/2021 05:53 AM    ALTSGPT 515 (H) 04/01/2021 05:53 AM    TBILIRUBIN 0.3 04/01/2021 05:53 AM        Imaging/Testing:  CT-HEAD W/O   Final Result      Subacute left MCA infarct, with loss of gray-white matter differentiation in the left frontal lobe. No hemorrhagic transformation.      MR-BRAIN-WITH & W/O   Final Result   Addendum 1 of 1   Also noted in the left frontal  calvarium is an area of enhancement in the    diploic space measuring approximately 11 mm which corresponds to the    location concerning for lytic lesion on the recent CT of the head. Finding    is concerning for metastatic osseous    disease.      Final      1.  Moderate-sized left middle cerebral artery acute infarct with possible early hemorrhagic transformation.   2.  Scattered punctate and small acute infarcts involving the bilateral parieto-occipital regions and left cerebellar hemisphere.   3.  There is a pattern of mild supratentorial white matter disease with scattered foci of bright T2 and FLAIR signal in the subcortical and deep white matter of both hemispheres consistent with small vessel ischemic change versus demyelination or    gliosis. Mild diffuse cerebral substance loss.   4.  Mild microangiopathic ischemic change versus demyelination or gliosis.      Attempts to convey these findings to Dr. HERNANDEZ PALOMO were initiated on 3/31/2021 10:44 AM. Findings were discussed with Dr. Palomo on 3/31/2021 at 1056 hours.      CT-CEREBRAL PERFUSION ANALYSIS   Final Result      1.  Normal study. Cerebral blood flow less than 30% likely representing completed infarct = 0 mL.      2.  T Max more than 6 seconds likely representing combination of completed infarct and ischemia = 0 mL.      3.  Mismatched volume likely representing ischemic brain/penumbra = None      4.  Please note that the cerebral perfusion was performed on the limited brain tissue around the basal ganglia region. Infarct/ischemia outside the CT perfusion sections can be missed in this study.      CT-CTA NECK WITH & W/O-POST PROCESSING   Final Result      Beaded appearance of the cervical carotid and vertebral arteries most suggestive of fibromuscular dysplasia.      No significant stenosis or dissection.      Lytic lesion in the C7 vertebral body and the left frontal calvarium. Malignancy is concerning and must be excluded.      CT-CTA  HEAD WITH & W/O-POST PROCESS   Final Result      Acute/subacute left MCA infarct.      No large vessel occlusion or hemodynamically significant stenosis.      Small lytic lesion in the left frontal calvaria concerning for malignancy.      [id number] VIEW   Final Result         1.  Nonspecific bowel gas pattern.      US-EXTREMITY VENOUS LOWER BILAT   Final Result      MR-PELVIS-WITH & W/O AND SEQUENCES   Final Result         1. Stage IV endometrial cancer. The endometrial mass itself measures 13 cm, invading through the anterior uterine wall and uterine fundus.   2. Metastatic parametrial, external iliac and para-aortic lymphadenopathy.   3. Multiple pelvic osseous metastases.   4. Several uterine fibroids.            US-PELVIC TRANSABDOMINAL ONLY   Final Result      Enlarged, heterogeneous uterus could indicate underlying fibroids though malignancy cannot be excluded.      Endometrial stripe not visualized.      CT-CTA COMPLETE THORACOABDOMINAL AORTA   Final Result      No evidence of aortic aneurysm or dissection.      Partial visualization of a markedly enlarged heterogeneous uterus with hypervascularity concerning for malignancy versus fibroids.      Diverticulosis without evidence of diverticulitis.      Bilateral hydronephrosis and hydroureter likely related to enlarged uterus.      Nonobstructive left renal stone.      Bilateral pulmonary nodules measuring up to 5 mm.      Nodule recommendations:   Low Risk: No routine follow-up      High Risk: Optional CT at 12 months      Comments: Use most suspicious nodule as guide to management. Follow-up intervals may vary according to size and risk.      Low Risk - Minimal or absent history of smoking and of other known risk factors.      High Risk - History of smoking or of other known risk factors.      Note: These recommendations do not apply to lung cancer screening, patients with immunosuppression, or patients with known primary cancer.      Fleischner Society  2017 Guidelines for Management of Incidentally Detected Pulmonary Nodules in Adults      DX-CHEST-PORTABLE (1 VIEW)   Final Result      No acute cardiac or pulmonary abnormalities are identified.      EC-ECHOCARDIOGRAM COMPLETE W/ CONT    (Results Pending)         Assessment and Plan:    71-year-old female who underwent surgery 3 days ago for extensive metastatic endometrial cancer.  The next morning she woke up being confused. On examination she had expressive aphasia. CTA head and neck was obtained which showed a acute to subacute left frontal infarct. CTA were unremarkable. Etiology of the infarct is unknown. However given the cancer history she may be hypercoagulable. There is also signs of metastatic cancer with 2 lytic lesions one skull and one at C7 on the CT scans.    Plan:  1. Start aspirin 81 mg daily   2.  MRI brain showed embolic-looking infarcts, largest in the left frontal parietal lobe.  There are in multiple vascular territories.  Most likely cardioembolic phenomenon.  3. Blood pressure well controlled. No need for permissive hypertension. I do not think the patient is perfusion dependent.  4. Glucose control per primary recommendations normoglycemia  5. PT/OT/speech      Given the cancer diagnosis we may need to consider getting palliative  care consult to get clarification of goals of care.    At this time I do not have any further recommendations.  Continue with aspirin.  I am thinking she is hypercoagulable due to underlying cancer.  Could consider anticoagulation if able.  However she has anemia.  Also need to consider life expectancy given the cancer diagnosis.      I will sign off this time.  Please consult if any have any further questions.          The evaluation of the patient, and recommended management, was discussed with the resident staff. I have performed a physical exam and reviewed and updated ROS and Plan today (4/1/2021). In review of yesterday's note (3/31/2021), there are no  changes except as documented above.    This chart was partially generated using voice recognition technology and sound alike word replacement may be present, best efforts were made to make the chart accurate.    Clem Palomo MD  Board Certified Neurology, ABPN  t) [phone]

## 2021-04-01 NOTE — CARE PLAN
Problem: Pain Management  Goal: Pain level will decrease to patient's comfort goal  Outcome: PROGRESSING AS EXPECTED     Problem: Respiratory:  Goal: Respiratory status will improve  Outcome: PROGRESSING AS EXPECTED     Problem: Urinary Elimination:  Goal: Ability to reestablish a normal urinary elimination pattern will improve  Outcome: PROGRESSING AS EXPECTED     Problem: Skin Integrity  Goal: Risk for impaired skin integrity will decrease  Outcome: PROGRESSING AS EXPECTED

## 2021-04-01 NOTE — THERAPY
"Physical Therapy   Initial Evaluation     Patient Name: Graciela Desai  Age:  71 y.o., Sex:  female  Medical Record #: [id number]  Today's Date: 4/1/2021     Precautions: Fall Risk, Swallow Precautions ( See Comments)(KATIUSKA)    Assessment  Patient is 71 y.o. female with a diagnosis of stage IV endometrial cancer with multiple metastases \"underwent total abdominal hysterectomy with salpingo oophorectomy, para-aortic and pelvic lymph node dissection, omentectomy, bilateral ureterolysis, with peritoneal biopsies, cystoscopy repair, and bilateral ureteral stent placement on 3/29/2021\".   Post op pt presented with onset of confusion, MRI revealed moderate Left MCA infarct. Main deficit upon assessment is expressive aphasia, BUE and BLE motor and sensory intact. Pt is able to follow commands for mobility tasks, as well as ambulate for short distances with FWW.( See  Below for findings and goals).   Pt resides with her spouse in a single level apartment, spouse is concerned about taking pt home prior to some rehabilitation, recommend post acute PT to maximize function and assist with family education.  Will continue inpatient PT services during acute stay.       Plan    Recommend Physical Therapy 4 times per week until therapy goals are met for the following treatments:  Bed Mobility, Equipment, Gait Training, Neuro Re-Education / Balance, Self Care/Home Evaluation, Therapeutic Activities and Therapeutic Exercises    DC Equipment Recommendations: Unable to determine at this time  Discharge Recommendations: Recommend post-acute placement for additional physical therapy services prior to discharge home          Objective       04/01/21 1045   Prior Living Situation   Prior Services None   Housing / Facility 1 Story Apartment / Condo   Steps Into Home 2   Equipment Owned 4-Wheel Walker   Lives with - Patient's Self Care Capacity Spouse   Comments spouse assisted with ADLs as needed.    Prior Level of Functional Mobility   Bed " Mobility Independent   Transfer Status Independent   Ambulation Independent   Distance Ambulation (Feet)   (household w/ 4WW)   Assistive Devices Used 4-Wheel Walker   Stairs Required Assist   Comments spouse provides assist with transportation.    Cognition    Speech/ Communication Expressive Aphasia   Level of Consciousness Alert   Ability To Follow Commands 2 Step   Comments responds 'yes to most questions.   Vision   Vision Comments appears WNLwith activity .    Balance Assessment   Sitting Balance (Static) Fair +   Sitting Balance (Dynamic) Fair   Standing Balance (Static) Fair   Standing Balance (Dynamic) Fair -   Weight Shift Sitting Fair   Weight Shift Standing Fair   Comments w/ FWW   Gait Analysis   Gait Level Of Assist Minimal Assist   Assistive Device Front Wheel Walker   Distance (Feet) 12   # of Times Distance was Traveled 2   Deviation Decreased Base Of Support;Shuffled Gait;Decreased Heel Strike;Decreased Toe Off;Bradykinetic   # of Stairs Climbed 0   Weight Bearing Status no restrictions.    Vision Deficits Impacting Mobility none noted during short distance ambulation.    Bed Mobility    Supine to Sit Supervised   Sit to Supine Supervised   Scooting Supervised   Rolling Supervised   Functional Mobility   Sit to Stand Minimal Assist   Bed, Chair, Wheelchair Transfer Minimal Assist   Toilet Transfers Minimal Assist   Transfer Method Stand Step   Mobility bed to chair to toilet and back to chair.    Comments cues for sequencing    Patient / Family Goals    Patient / Family Goal #1 return home with spouse (per spouse)   Short Term Goals    Short Term Goal # 1 Pt will be transfer with LRAD with SPV from bed to chair by the 6th PT visit   Short Term Goal # 2 Pt will ambulate w/ LRAD for 150 ft with SPV by the 6th tx   Short Term Goal # 3 PT will ascend and descend steps x 2 with SPV by the 6th tx   Short Term Goal # 4 Pt will maintain standing balance w/single UE support for 3 min for ADL's by the 6th tx    Problem List    Problems Impaired Bed Mobility;Impaired Transfers;Impaired Ambulation;Functional Strength Deficit;Impaired Balance;Impaired Coordination;Decreased Activity Tolerance;Motor Planning / Sequencing

## 2021-04-01 NOTE — THERAPY
"Occupational Therapy   Initial Evaluation     Patient Name: Graciela Desai  Age:  71 y.o., Sex:  female  Medical Record #: [id number]  Today's Date: 4/1/2021     Precautions  Precautions: Fall Risk, Swallow Precautions ( See Comments)  Comments: acute CVA    Assessment  Patient is 71 y.o. female with a diagnosis of stage IV endometrial cancer with metastases, underwent \"post total abdominal hysterectomy with salpingo-oophorectomy, periaortic and pelvic lymph node dissection, omentectomy, bilateral ureterolysis with peritoneal biopsies, cystoscopy repair and bilateral ureteral stent placement on 3/29/2021\" post op pt had acute  L MCA infarct, now demonstrating expressive aphasia, appears to have some component of receptive aphasia, hard to tease out motor apraxia versus receptive aphasia likely some component of both, grossly BUE strength and coordination appears intact during functional activities, beside \"yes\" no other vocalization appreciated during session. Pt will benefit from acute skilled OT services while in house and post acute placement recommended at this time with emphasis on family training.     Plan    Recommend Occupational Therapy 4 times per week until therapy goals are met for the following treatments:  Adaptive Equipment, Cognitive Skill Development, Manual Therapy Techniques, Neuro Re-Education / Balance, Self Care/Activities of Daily Living, Therapeutic Activities and Therapeutic Exercises.    DC Equipment Recommendations: Unable to determine at this time  Discharge Recommendations: Recommend post-acute placement for additional occupational therapy services prior to discharge home      Objective       04/01/21 1048   Prior Living Situation   Prior Services Intermittent Physical Support for ADL Per Family   Housing / Facility 1 Story Apartment / Condo   Steps Into Home 2   Steps In Home 0   Equipment Owned 4-Wheel Walker   Lives with - Patient's Self Care Capacity Spouse   Comments D/t expressive " aphasia pt unable to provide PLOF. Per physical therapist, spouse reported he assisted with ADLs as needed, performed IADLs. Spouse is concerned about taking care of pt at home at current functioning level   Prior Level of ADL Function   Self Feeding Independent   Grooming / Hygiene Independent   Bathing Requires Assist   Dressing Requires Assist   Toileting Requires Assist   Comments intermittent assistance by spouse as needed   Prior Level of IADL Function   Medication Management Requires Assist   Laundry Requires Assist   Kitchen Mobility Requires Assist   Finances Requires Assist   Home Management Requires Assist   Shopping Requires Assist   Prior Level Of Mobility Supervision With Device in Home   Driving / Transportation Relatives / Others Provide Transportation   Cognition    Cognition / Consciousness X   Speech/ Communication Delayed Responses;Expressive Aphasia   Level of Consciousness Alert   Ability To Follow Commands 1 Step   Safety Awareness Impaired   Sequencing Impaired   Initiation Impaired   Comments appears to initiate use of ADL objects but demonstrates motor apraxia    Strength Upper Body   Upper Body Strength  X   Comments grossly intact, but unable to assess d/t cognition   Sensation Upper Body   Upper Extremity Sensation  X   Comments unable to assess    Coordination Upper Body   Coordination X   Comments grossly intact but unable to assess it d/t cognition   Balance Assessment   Sitting Balance (Static) Fair +   Sitting Balance (Dynamic) Fair   Standing Balance (Static) Fair   Standing Balance (Dynamic) Fair -   Weight Shift Sitting Fair   Weight Shift Standing Fair   Comments w/FWW, once initiated   ADL Assessment   Eating Minimal Assist   Grooming Minimal Assist;Seated  (facewashing and handwashing)   Bathing   (NT)   Upper Body Dressing Minimal Assist   Lower Body Dressing Moderate Assist   Toileting Moderate Assist  (assists with frontal pericare)   Comments cues for sequencing, appears  to have component of motor apraxia vs receptive aphasia    Functional Mobility   Sit to Stand Minimal Assist   Bed, Chair, Wheelchair Transfer Minimal Assist   Toilet Transfers Minimal Assist   Mobility within room   Comments w/FWW   Short Term Goals   Short Term Goal # 1 Pt will perform functional t/f's with supervision   Short Term Goal # 2 Pt will perform LB dressing with min a   Short Term Goal # 3 Pt will perform toileting task with min a   Anticipated Discharge Equipment and Recommendations   DC Equipment Recommendations Unable to determine at this time

## 2021-04-01 NOTE — DISCHARGE PLANNING
Anticipated Discharge Disposition: TBD    Action: Patient discussed in IDT rounds. She is currently not medically cleared. PT recommending post acute placement.    Barriers to Discharge: medical clearance, may need placement depending on POC    Plan: Case coordination to continue to follow for discharge planning support

## 2021-04-01 NOTE — PROGRESS NOTES
[facility] PROGRESS NOTE     Attending:   Dr. Noble    Resident:   Jany Nuno MD    PATIENT: Graciela Desai; [id number]; 1950    ID: 71 y.o. female admitted for uterine mass, suspected malignancy, now status post total abdominal hysterectomy with salpingo-oophorectomy, periaortic and pelvic lymph node dissection, omentectomy, bilateral ureterolysis with peritoneal biopsies, cystoscopy repair and bilateral ureteral stent placement on 3/29/2021    SUBJECTIVE: No acute events overnight, though yesterday afternoon, there was concern of deteriorating neurologic status and possible hemorrhagic conversion of her acute infarct.  A stat CT head was performed which showed no hemorrhagic conversion of infarct.  This morning, patient nods yes to most all questioning.  When asked if she is in pain, she nods yes, but does not appear to be in significant pain.  This morning, she is following commands appropriately.    OBJECTIVE:     Vitals:    03/31/21 1953 03/31/21 2357 04/01/21 0437 04/01/21 0701   BP: 133/75 132/80 144/78 139/77   Pulse: (!) 112 (!) 116 (!) 109 (!) 107   Resp: 18 18 18 18   Temp: 36.6 °C (97.8 °F) 36.1 °C (97 °F) 36.4 °C (97.5 °F) 37.1 °C (98.8 °F)   TempSrc: Temporal Temporal Temporal Temporal   SpO2: 98% 95% 95% 99%   Weight: 67.8 kg (149 lb 7.6 oz)      Height:           Intake/Output Summary (Last 24 hours) at 4/1/2021 0704  Last data filed at 4/1/2021 0600  Gross per 24 hour   Intake 3036.1 ml   Output 4415 ml   Net -1378.9 ml       PE:   General: No acute distress, resting comfortably in bed.  HEENT: Normocephalic, atraumatic. EOMI. MMM  Cardiovascular: Tachycardic, regular rhythm, with no M/R/G.  Respiratory: Symmetrical chest. Clear to auscultation bilaterally with no wheezes, rales or rhonchi  Abdomen: soft, apparently non-tender, non-distended no masses.  Surgical incision without drainage or erythema.  KATIUSKA drain with serosanguineous output.  Beverly catheter in place.  EXT:  Moves all  extremities, grossly normal.  Trace pedal edema, no cyanosis. 2+ pulses in all extremities  Neuro: Difficult to assess, does not follow all commands appropriately, but does move all extremities.  Nods yes to most questioning.    LABS:  Recent Labs     03/30/21  0005 03/31/21  0022 03/31/21  1138   WBC 22.4* 13.8*  --    RBC 4.40 3.42*  --    HEMOGLOBIN 11.1* 8.4* 8.1*   HEMATOCRIT 34.6* 26.9* 24.8*   MCV 78.6* 78.7*  --    MCH 25.2* 24.6*  --    RDW 73.8*  --   --    PLATELETCT 267 232  --    MPV 9.4 9.8  --    NEUTSPOLYS  --  85.70*  --    LYMPHOCYTES  --  6.70*  --    MONOCYTES  --  6.80  --    EOSINOPHILS  --  0.10  --    BASOPHILS  --  0.10  --    RBCMORPHOLO  --  Present  --      Recent Labs     03/30/21  0005 03/31/21  0022   SODIUM 138 136   POTASSIUM 4.8 4.7   CHLORIDE 104 107   CO2 22 23   BUN 11 13   CREATININE 1.17 1.01   CALCIUM 8.6 8.0*   MAGNESIUM 1.7  --    ALBUMIN 1.9* 2.0*     Estimated GFR/CRCL = Estimated Creatinine Clearance: 45.1 mL/min (by C-G formula based on SCr of 1.01 mg/dL).  Recent Labs     03/30/21  0005 03/31/21  0022   GLUCOSE 253* 157*     Recent Labs     03/30/21  0005 03/31/21  0022   ASTSGOT 753* 990*   ALTSGPT 584* 869*   TBILIRUBIN 0.5 0.2   ALKPHOSPHAT 86 129*   GLOBULIN 3.3 2.7   AMMONIA  --  13         Recent Labs     03/29/21  1624 03/29/21  1758   ISTATAPH 7.344* 7.233*   ISTATAPCO2 44.9* 52.6*   ISTATAPO2 231* 318*   ISTATATCO2 26 24   [id number] 100* 100*   ISTATARTHCO3 24.5 22.2   ISTATARTBE -1 -5*   ISTATTEMP 37.0 C see below   ISTATFIO2 0.9  --    ISTATSPEC Arterial Arterial   ISTATAPHTC 7.344*  --    [id number] 231*  --      No results for input(s): INR, APTT, FIBRINOGEN in the last 72 hours.    Invalid input(s): DIMER    MICROBIOLOGY:   Results     Procedure Component Value Units Date/Time    URINE CULTURE(NEW) [[id number]] Collected: 03/27/21 1329    Order Status: Completed Specimen: Urine, Clean Catch Updated: 03/29/21 0832     Significant Indicator NEG     Source  "UR     Site URINE, CLEAN CATCH     Culture Result Usual urogenital marisa 10-50,000 cfu/mL    Narrative:      Indication for culture:->Patient WITHOUT an indwelling Beverly  catheter in place with new onset of Dysuria, Frequency,  Urgency, and/or Suprapubic pain  Indication for culture:->Patient WITHOUT an indwelling Beverly    URINALYSIS [[id number]]  (Abnormal) Collected: 03/27/21 1329    Order Status: Completed Specimen: Urine, Clean Catch Updated: 03/27/21 1359     Color Yellow     Character Cloudy     Specific Gravity 1.009     Ph 5.5     Glucose Negative mg/dL      Ketones Negative mg/dL      Protein 100 mg/dL      Bilirubin Negative     Urobilinogen, Urine 0.2     Nitrite Negative     Leukocyte Esterase Moderate     Occult Blood Large     Micro Urine Req Microscopic    Narrative:      Indication for culture:->Patient WITHOUT an indwelling Beverly  catheter in place with new onset of Dysuria, Frequency,  Urgency, and/or Suprapubic pain    SARS-CoV-2 PCR (24 hour In-House): Collect NP swab in [facility] [[id number]] Collected: 03/25/21 2340    Order Status: Completed Specimen: Respirate from Nasopharyngeal Updated: 03/26/21 1035     SARS-CoV-2 Source NP Swab     SARS-CoV-2 by PCR NotDetected     Comment: PATIENTS: Important information regarding your results and instructions can  be found at [email or web address]   \"After your  Covid-19 Test\"  [facility] providers: PLEASE REFER TO DE-ESCALATION AND RETESTING PROTOCOL  on [email or web address]  **The TaqPath COVID-19 SARS-CoV-2 RT-PCR test has been made available for  use under the Emergency Use Authorization (EUA) only.         Narrative:      Have you been in close contact with a person who is suspected  or known to be positive for COVID-19 within the last 30 days  (e.g. last seen that person < 30 days ago)->Unknown            IMAGING:   CT-HEAD W/O   Final Result      Subacute left MCA infarct, with loss of gray-white matter differentiation in the left " frontal lobe. No hemorrhagic transformation.      MR-BRAIN-WITH & W/O   Final Result   Addendum 1 of 1   Also noted in the left frontal calvarium is an area of enhancement in the    diploic space measuring approximately 11 mm which corresponds to the    location concerning for lytic lesion on the recent CT of the head. Finding    is concerning for metastatic osseous    disease.      Final      1.  Moderate-sized left middle cerebral artery acute infarct with possible early hemorrhagic transformation.   2.  Scattered punctate and small acute infarcts involving the bilateral parieto-occipital regions and left cerebellar hemisphere.   3.  There is a pattern of mild supratentorial white matter disease with scattered foci of bright T2 and FLAIR signal in the subcortical and deep white matter of both hemispheres consistent with small vessel ischemic change versus demyelination or    gliosis. Mild diffuse cerebral substance loss.   4.  Mild microangiopathic ischemic change versus demyelination or gliosis.      Attempts to convey these findings to Dr. HERNANDEZ PALOMO were initiated on 3/31/2021 10:44 AM. Findings were discussed with Dr. Palomo on 3/31/2021 at 1056 hours.      CT-CEREBRAL PERFUSION ANALYSIS   Final Result      1.  Normal study. Cerebral blood flow less than 30% likely representing completed infarct = 0 mL.      2.  T Max more than 6 seconds likely representing combination of completed infarct and ischemia = 0 mL.      3.  Mismatched volume likely representing ischemic brain/penumbra = None      4.  Please note that the cerebral perfusion was performed on the limited brain tissue around the basal ganglia region. Infarct/ischemia outside the CT perfusion sections can be missed in this study.      CT-CTA NECK WITH & W/O-POST PROCESSING   Final Result      Beaded appearance of the cervical carotid and vertebral arteries most suggestive of fibromuscular dysplasia.      No significant stenosis or dissection.        Lytic lesion in the C7 vertebral body and the left frontal calvarium. Malignancy is concerning and must be excluded.      CT-CTA HEAD WITH & W/O-POST PROCESS   Final Result      Acute/subacute left MCA infarct.      No large vessel occlusion or hemodynamically significant stenosis.      Small lytic lesion in the left frontal calvaria concerning for malignancy.      [id number] VIEW   Final Result         1.  Nonspecific bowel gas pattern.      US-EXTREMITY VENOUS LOWER BILAT   Final Result      MR-PELVIS-WITH & W/O AND SEQUENCES   Final Result         1. Stage IV endometrial cancer. The endometrial mass itself measures 13 cm, invading through the anterior uterine wall and uterine fundus.   2. Metastatic parametrial, external iliac and para-aortic lymphadenopathy.   3. Multiple pelvic osseous metastases.   4. Several uterine fibroids.            US-PELVIC TRANSABDOMINAL ONLY   Final Result      Enlarged, heterogeneous uterus could indicate underlying fibroids though malignancy cannot be excluded.      Endometrial stripe not visualized.      CT-CTA COMPLETE THORACOABDOMINAL AORTA   Final Result      No evidence of aortic aneurysm or dissection.      Partial visualization of a markedly enlarged heterogeneous uterus with hypervascularity concerning for malignancy versus fibroids.      Diverticulosis without evidence of diverticulitis.      Bilateral hydronephrosis and hydroureter likely related to enlarged uterus.      Nonobstructive left renal stone.      Bilateral pulmonary nodules measuring up to 5 mm.      Nodule recommendations:   Low Risk: No routine follow-up      High Risk: Optional CT at 12 months      Comments: Use most suspicious nodule as guide to management. Follow-up intervals may vary according to size and risk.      Low Risk - Minimal or absent history of smoking and of other known risk factors.      High Risk - History of smoking or of other known risk factors.      Note: These recommendations do  not apply to lung cancer screening, patients with immunosuppression, or patients with known primary cancer.      Fleischner Society 2017 Guidelines for Management of Incidentally Detected Pulmonary Nodules in Adults      DX-CHEST-PORTABLE (1 VIEW)   Final Result      No acute cardiac or pulmonary abnormalities are identified.      EC-ECHOCARDIOGRAM COMPLETE W/ CONT    (Results Pending)       MEDS:  Current Facility-Administered Medications   Medication Last Admin   • haloperidol lactate (HALDOL) injection 1 mg 1 mg at 03/31/21 2206   • Pharmacy Consult Request ...Pain Management Review 1 Each     • ondansetron (ZOFRAN) syringe/vial injection 4 mg     • NS (BOLUS) infusion 500 mL     • dextrose 5 % and 0.45 % NaCl with KCl 20 mEq New Bag at 03/31/21 2211   • prochlorperazine (COMPAZINE) injection 10 mg     • morphine 1 mg/mL in 50 mL (PCA) Rate Verify at 03/31/21 1922   • senna-docusate (PERICOLACE or SENOKOT S) 8.6-50 MG per tablet 2 tablet 2 tablet at 04/01/21 0513    And   • polyethylene glycol/lytes (MIRALAX) PACKET 1 Packet      And   • magnesium hydroxide (MILK OF MAGNESIA) suspension 30 mL      And   • bisacodyl (DULCOLAX) suppository 10 mg     • ondansetron (ZOFRAN ODT) dispertab 4 mg     • [Held by provider] amLODIPine (NORVASC) tablet 5 mg Stopped at 03/30/21 1800         ASSESSMENT/PLAN:   71-year-old female with longstanding history of irregular vaginal bleeding and suspected uterine malignancy, admitted for concern for uterine malignancy now status post total abdominal hysterectomy with salpingo-oophorectomy, periaortic and pelvic lymph node dissection, omentectomy, bilateral ureterolysis with peritoneal biopsies, cystoscopy repair and bilateral ureteral stent placement on 3/29/2021 transfer to telemetry on 3/30/2021 following development of confusion and aphasia suspicious for new stroke.     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  Evidence of metastasis to  omentum, likely lymph nodes, possible bone as well as on brain imaging lesions seen in C7 vertebral body as well as left frontal calvaria.  -Probably going on, pathology pending, but suspect carcinoma  -Further recommendations per Gyn/Onc  -Plan to discuss palliative consult with patient and partner Star today     #Acute left MCA infarct  Patient with development of expressive aphasia and confusion on 3/30/2021 with subsequent findings of acute infarction.  Per neurology note, pattern of infarct suspicious for cardioembolic phenomenon possibly related to hypercoagulability due to underlying cancer.  -Repeat CT head without contrast ruled out hemorrhagic conversion  -PT/OT/speech ordered  -FEES pending per speech  -As above, overall prognosis is poor and would like to involve palliative care  -Otherwise, patient will likely require postacute rehab for speech therapy  -Echocardiogram still pending     #Acute transaminitis  Patient with acute rise in transaminases, suspect due to shock liver  Ammonia level normal  -We will hold statin, Tylenol, hold all other hepatotoxins  -Continue to monitor, downtrending today     #Iron deficiency anemia due to chronic blood loss  Hemoglobin continues to downtrend.  7.3/22.8 today.  Will recheck at noon  Continue to monitor, transfuse as indicated for hemoglobin less than 7     #Hypokalemia  Continue to monitor, replace as indicated     #Hyperlipidemia  -Holding statin as above     #Prediabetes  #Essential hypertension  -We will add ISS  -Holding home amlodipine     Core Measures:  DVT PPX: SCDs  ABX: None  Lines: PIV  Fluids: D5 1/2 NS with 20meq KCl at 125ml/hr  PCP: Shan Tuttle MD  CODE STATUS: Full     Dispo: Inpatient

## 2021-04-01 NOTE — PROGRESS NOTES
GYN/Oncology Progress Note               Author: ZOHAIB Shahid Date & Time created: 4/1/2021  10:14 AM     Interval History:  Alert only able to answer yes/no questions. Having pain at this time Denies any nausea, +flatus    Review of Systems:  Review of Systems   Constitutional: Negative for chills and fever.   Respiratory: Negative for cough and shortness of breath.    Cardiovascular: Negative for chest pain and leg swelling.   Gastrointestinal: Negative for abdominal pain, nausea and vomiting.       Physical Exam:  Physical Exam  Constitutional:       General: She is not in acute distress.  Cardiovascular:      Pulses: Normal pulses.   Pulmonary:      Effort: Pulmonary effort is normal.   Abdominal:      Comments: Midline dressing with old drainage. Left KATIUSKA drain.    Genitourinary:     Comments: Mild hematuria  Skin:     General: Skin is warm and dry.      Capillary Refill: Capillary refill takes 2 to 3 seconds.   Neurological:      Mental Status: She is alert. She is disoriented.      Cranial Nerves: Dysarthria present. No facial asymmetry.      Sensory: No sensory deficit.      Motor: Pronator drift present. No weakness.         Labs:  Recent Labs     03/29/21  1624 03/29/21  1758   ISTATAPH 7.344* 7.233*   ISTATAPCO2 44.9* 52.6*   ISTATAPO2 231* 318*   ISTATATCO2 26 24   [id number] 100* 100*   ISTATARTHCO3 24.5 22.2   ISTATARTBE -1 -5*   ISTATTEMP 37.0 C see below   ISTATFIO2 0.9  --    ISTATSPEC Arterial Arterial   ISTATAPHTC 7.344*  --    [id number] 231*  --          Recent Labs     03/30/21  0005 03/31/21  0022 04/01/21  0553   SODIUM 138 136 136   POTASSIUM 4.8 4.7 4.1   CHLORIDE 104 107 104   CO2 22 23 25   BUN 11 13 9   CREATININE 1.17 1.01 0.69   MAGNESIUM 1.7  --   --    CALCIUM 8.6 8.0* 7.9*     Recent Labs     03/30/21  0005 03/31/21  0022 04/01/21  0553   ALTSGPT 584* 869* 515*   ASTSGOT 753* 990* 307*   ALKPHOSPHAT 86 129* 157*   TBILIRUBIN 0.5 0.2 0.3   GLUCOSE 253* 157* 137*      Recent Labs     21  0005 21  0005 21  0022 21  1138 21  0553   RBC 4.40  --  3.42*  --  2.87*   HEMOGLOBIN 11.1*   < > 8.4* 8.1* 7.3*   HEMATOCRIT 34.6*   < > 26.9* 24.8* 22.8*   PLATELETCT 267  --  232  --  231    < > = values in this interval not displayed.     Recent Labs     21  0005 21  0022 21  0553   WBC 22.4* 13.8* 18.5*   NEUTSPOLYS  --  85.70* 86.40*   LYMPHOCYTES  --  6.70* 5.80*   MONOCYTES  --  6.80 6.10   EOSINOPHILS  --  0.10 0.60   BASOPHILS  --  0.10 0.20   ASTSGOT 753* 990* 307*   ALTSGPT 584* 869* 515*   ALKPHOSPHAT 86 129* 157*   TBILIRUBIN 0.5 0.2 0.3     Recent Labs     21  0005 21  0022 21  0553   SODIUM 138 136 136   POTASSIUM 4.8 4.7 4.1   CHLORIDE 104 107 104   CO2 22 23 25   GLUCOSE 253* 157* 137*   BUN 11 13 9   CREATININE 1.17 1.01 0.69   CALCIUM 8.6 8.0* 7.9*     Hemodynamics:  Temp (24hrs), Av.7 °C (98.1 °F), Min:36.1 °C (97 °F), Max:37.4 °C (99.3 °F)  Temperature: 37.1 °C (98.8 °F)  Pulse  Av.2  Min: 73  Max: 116   Blood Pressure : 139/77     Respiratory:    Respiration: 18, Pulse Oximetry: 99 %        RUL Breath Sounds: Clear, RML Breath Sounds: Clear, RLL Breath Sounds: Diminished, KAREN Breath Sounds: Clear, LLL Breath Sounds: Diminished  Fluids:    Intake/Output Summary (Last 24 hours) at 3/30/2021 1046  Last data filed at 3/30/2021 1019  Gross per 24 hour   Intake 5468.5 ml   Output 1655 ml   Net 3813.5 ml     Weight: 67.8 kg (149 lb 7.6 oz)  GI/Nutrition:  Orders Placed This Encounter   Procedures   • Diet Order Diet: Clear Liquid     Standing Status:   Standing     Number of Occurrences:   1     Order Specific Question:   Diet:     Answer:   Clear Liquid [10]     Medical Decision Making, by Problem:  Active Hospital Problems    Diagnosis    • *Metastatic endometrial cancer (HCC) [C54.1]    • Cystitis [N30.90]    • Constipation [K59.00]    • Iron deficiency anemia due to chronic blood loss [D50.0]    •  Hypokalemia [E87.6]    • Hyponatremia [E87.1]    • Peripheral edema [R60.9]    • Stage 3 chronic kidney disease [N18.30]    • Hyperlipidemia [E78.5]    • Essential hypertension [I10]    • Prediabetes [R73.03]        Plan:  72 y/o found to have a endometrial mass now s/p MACKENZIE/BSO/PALND/PLND/omentectomy/bilateral uretrolysis/peritoneal biopsies/cystotomy repair/bilateral ureteral stent placement on 3/29/21     1. POD #3: found to have an enlarged 18 week uterus with serosal tumor involvement and peritoneal disease with densly adherent bladder. clear liquid diet w/ advancement pending bowel fxn and SLP eval, KATIUSKA drain serosang, encourage ambulation, Needs stents x6 weeks, dunn x3 weeks.   2. Expressive aphasia: L MCA infarct, cardiothrombotic event, appreciate neurology assistance, repeat CTH w/ no change today, Hg down trending, will recheck in am and if stable start ASA tomorrow  3. Post op pain: MS PCA demand only to limit sedation  4. Intraop anemia: s/p 2 units intraop, hg 11 > 8> 7.3.  5. Leukocytosis: afebrile, likely reactive, recheck in am  6. Transaminitis: persistent, NH4 wnl, suspect d/t shock, downward trending, no tylenol  7. Endometrial mass: surgical findings and details d/w patient and boyfriend in detail today, awaiting final path however clinical c/w carcinoma introperatively, further treatment will depend upon recovery course  8. Dysphagia: s/p SLP consult, FEES study when able  9. GI/FEN: CLD, monitor lytes and replete PRN  10. Ppx: SCDs, lovenox ppx in AM if Hgb stable  11. Dispo: continue inpatient management for postop care and stroke eval      Quality-Core Measures

## 2021-04-01 NOTE — PROGRESS NOTES
Sinus rhythm, sinus tach     [birth year]     Quality 431: Preventive Care And Screening: Unhealthy Alcohol Use - Screening: Patient screened for unhealthy alcohol use using a single question and scores less than 2 times per year Quality 128: Preventive Care And Screening: Body Mass Index (Bmi) Screening And Follow-Up Plan: BMI is documented within normal parameters and no follow-up plan is required. Detail Level: Detailed Quality 226: Preventive Care And Screening: Tobacco Use: Screening And Cessation Intervention: Patient screened for tobacco and is an ex-smoker Quality 111:Pneumonia Vaccination Status For Older Adults: Pneumococcal Vaccination Previously Received Quality 110: Preventive Care And Screening: Influenza Immunization: Influenza Immunization previously received during influenza season Quality 130: Documentation Of Current Medications In The Medical Record: Current Medications Documented

## 2021-04-02 LAB
ALBUMIN SERPL BCP-MCNC: 2.1 G/DL (ref 3.2–4.9)
ALBUMIN/GLOB SERPL: 0.6 G/DL
ALP SERPL-CCNC: 194 U/L (ref 30–99)
ALT SERPL-CCNC: 374 U/L (ref 2–50)
ANION GAP SERPL CALC-SCNC: 6 MMOL/L (ref 7–16)
ANISOCYTOSIS BLD QL SMEAR: ABNORMAL
AST SERPL-CCNC: 137 U/L (ref 12–45)
BASOPHILS # BLD AUTO: 0 % (ref 0–1.8)
BASOPHILS # BLD: 0 K/UL (ref 0–0.12)
BILIRUB SERPL-MCNC: 0.3 MG/DL (ref 0.1–1.5)
BUN SERPL-MCNC: 9 MG/DL (ref 8–22)
CALCIUM SERPL-MCNC: 8.2 MG/DL (ref 8.5–10.5)
CHLORIDE SERPL-SCNC: 108 MMOL/L (ref 96–112)
CO2 SERPL-SCNC: 27 MMOL/L (ref 20–33)
CREAT SERPL-MCNC: 0.68 MG/DL (ref 0.5–1.4)
EOSINOPHIL # BLD AUTO: 0.49 K/UL (ref 0–0.51)
EOSINOPHIL NFR BLD: 2.5 % (ref 0–6.9)
GLOBULIN SER CALC-MCNC: 3.3 G/DL (ref 1.9–3.5)
GLUCOSE SERPL-MCNC: 113 MG/DL (ref 65–99)
HCT VFR BLD AUTO: 24.7 % (ref 37–47)
HCT VFR BLD AUTO: 25.2 % (ref 37–47)
HGB BLD-MCNC: 8 G/DL (ref 12–16)
HGB BLD-MCNC: 8.1 G/DL (ref 12–16)
LYMPHOCYTES # BLD AUTO: 0.51 K/UL (ref 1–4.8)
LYMPHOCYTES NFR BLD: 2.6 % (ref 22–41)
MANUAL DIFF BLD: NORMAL
MCH RBC QN AUTO: 25.8 PG (ref 27–33)
MCHC RBC AUTO-ENTMCNC: 32.4 G/DL (ref 33.6–35)
MCV RBC AUTO: 79.7 FL (ref 81.4–97.8)
MICROCYTES BLD QL SMEAR: ABNORMAL
MONOCYTES # BLD AUTO: 0.84 K/UL (ref 0–0.85)
MONOCYTES NFR BLD AUTO: 4.3 % (ref 0–13.4)
MORPHOLOGY BLD-IMP: NORMAL
NEUTROPHILS # BLD AUTO: 17.76 K/UL (ref 2–7.15)
NEUTROPHILS NFR BLD: 90.6 % (ref 44–72)
NRBC # BLD AUTO: 0.04 K/UL
NRBC BLD-RTO: 0.2 /100 WBC
PLATELET # BLD AUTO: 243 K/UL (ref 164–446)
PLATELET BLD QL SMEAR: NORMAL
PMV BLD AUTO: 9.4 FL (ref 9–12.9)
POLYCHROMASIA BLD QL SMEAR: NORMAL
POTASSIUM SERPL-SCNC: 4.1 MMOL/L (ref 3.6–5.5)
PROT SERPL-MCNC: 5.4 G/DL (ref 6–8.2)
RBC # BLD AUTO: 3.1 M/UL (ref 4.2–5.4)
RBC BLD AUTO: PRESENT
SODIUM SERPL-SCNC: 141 MMOL/L (ref 135–145)
WBC # BLD AUTO: 19.6 K/UL (ref 4.8–10.8)

## 2021-04-02 PROCEDURE — 700102 HCHG RX REV CODE 250 W/ 637 OVERRIDE(OP): Performed by: STUDENT IN AN ORGANIZED HEALTH CARE EDUCATION/TRAINING PROGRAM

## 2021-04-02 PROCEDURE — A9270 NON-COVERED ITEM OR SERVICE: HCPCS | Performed by: STUDENT IN AN ORGANIZED HEALTH CARE EDUCATION/TRAINING PROGRAM

## 2021-04-02 PROCEDURE — 85007 BL SMEAR W/DIFF WBC COUNT: CPT

## 2021-04-02 PROCEDURE — 85018 HEMOGLOBIN: CPT

## 2021-04-02 PROCEDURE — 770020 HCHG ROOM/CARE - TELE (206)

## 2021-04-02 PROCEDURE — 700111 HCHG RX REV CODE 636 W/ 250 OVERRIDE (IP): Performed by: STUDENT IN AN ORGANIZED HEALTH CARE EDUCATION/TRAINING PROGRAM

## 2021-04-02 PROCEDURE — 80053 COMPREHEN METABOLIC PANEL: CPT

## 2021-04-02 PROCEDURE — 36415 COLL VENOUS BLD VENIPUNCTURE: CPT

## 2021-04-02 PROCEDURE — 85027 COMPLETE CBC AUTOMATED: CPT

## 2021-04-02 PROCEDURE — 85014 HEMATOCRIT: CPT

## 2021-04-02 PROCEDURE — 92612 ENDOSCOPY SWALLOW (FEES) VID: CPT

## 2021-04-02 RX ADMIN — NYSTATIN 500000 UNITS: 100000 SUSPENSION ORAL at 14:05

## 2021-04-02 RX ADMIN — DOCUSATE SODIUM 50 MG AND SENNOSIDES 8.6 MG 2 TABLET: 8.6; 5 TABLET, FILM COATED ORAL at 05:36

## 2021-04-02 RX ADMIN — ENOXAPARIN SODIUM 30 MG: 30 INJECTION SUBCUTANEOUS at 14:06

## 2021-04-02 RX ADMIN — ASPIRIN 81 MG: 81 TABLET, COATED ORAL at 14:05

## 2021-04-02 RX ADMIN — DOCUSATE SODIUM 50 MG AND SENNOSIDES 8.6 MG 2 TABLET: 8.6; 5 TABLET, FILM COATED ORAL at 18:01

## 2021-04-02 RX ADMIN — NYSTATIN 500000 UNITS: 100000 SUSPENSION ORAL at 20:41

## 2021-04-02 RX ADMIN — NYSTATIN 500000 UNITS: 100000 SUSPENSION ORAL at 10:04

## 2021-04-02 RX ADMIN — NYSTATIN 500000 UNITS: 100000 SUSPENSION ORAL at 18:01

## 2021-04-02 RX ADMIN — AMLODIPINE BESYLATE 5 MG: 5 TABLET ORAL at 18:01

## 2021-04-02 ASSESSMENT — FIBROSIS 4 INDEX: FIB4 SCORE: 2.01

## 2021-04-02 ASSESSMENT — PAIN DESCRIPTION - PAIN TYPE: TYPE: ACUTE PAIN

## 2021-04-02 NOTE — CARE PLAN
Problem: Communication  Goal: The ability to communicate needs accurately and effectively will improve  Outcome: PROGRESSING AS EXPECTED     Problem: Pain Management  Goal: Pain level will decrease to patient's comfort goal  Outcome: PROGRESSING AS EXPECTED     Problem: Infection  Goal: Will remain free from infection  Outcome: PROGRESSING AS EXPECTED     Problem: Psychosocial Needs:  Goal: Level of anxiety will decrease  Outcome: PROGRESSING AS EXPECTED     Problem: Fluid Volume:  Goal: Will maintain balanced intake and output  Outcome: PROGRESSING AS EXPECTED

## 2021-04-02 NOTE — CARE PLAN
Problem: Nutritional:  Goal: Achieve adequate nutritional intake  Description: Diet will advance >NPO/clear liquids and then patient will consume >50% of meals.   Outcome: NOT MET   See RD note.

## 2021-04-02 NOTE — PROGRESS NOTES
"  [facility] [place] FAMILY MEDICINE PROGRESS NOTE     Attending:   Dr. Noble    Resident:   Josue White MD    PATIENT:   Graciela Desai; [id number]; 1950    ID: 71 y.o. female admitted for uterine mass, suspected malignancy, now status post total abdominal hysterectomy with salpingo-oophorectomy, periaortic and pelvic lymph node dissection, omentectomy, bilateral ureterolysis with peritoneal biopsies, cystoscopy repair and bilateral ureteral stent placement on 3/29/2021    SUBJECTIVE:   No acute events overnight. The patient maintains her baseline this morning, nodding \"yes\" and \"no\" to questions to communicate. She follows commands. She denies pain or discomfort. She is agreeable to having her case discussed with her boyfriend, Star.    OBJECTIVE:  Vitals:    04/01/21 2028 04/01/21 2344 04/02/21 0430 04/02/21 0833   BP: 146/73 133/75 138/80 122/63   Pulse: (!) 104 100 96 100   Resp: 16 16 16 16   Temp: 37 °C (98.6 °F) 36.4 °C (97.6 °F) 36.6 °C (97.8 °F) 36.6 °C (97.9 °F)   TempSrc: Temporal Temporal Temporal Temporal   SpO2: 100% 100% 95% 96%   Weight: 64.5 kg (142 lb 3.2 oz)      Height:           Intake/Output Summary (Last 24 hours) at 4/2/2021 0921  Last data filed at 4/2/2021 0431  Gross per 24 hour   Intake 310 ml   Output 1500 ml   Net -1190 ml       PHYSICAL EXAM:   General: No acute distress, afebrile, resting comfortably, conversational   HEENT: NC/AT;  EOMI; multiple white patches present on tongue/ buccal mucosa  Cardiovascular: RRR without murmurs, rubs, heaves. Normal capillary refill   Respiratory: CTAB, no tachypnea or retractions   Abdomen: midline vertical surgical incision is closed, well healing without discharge, erythema or edema; normal bowel sounds, soft, nontender, nondistended, no masses, no organomegaly   EXT:  SCOTT, no edema  Skin: No erythema/lesions   Neuro: Non-focal, alert and orientated       LABS:  Recent Labs     03/31/21  0022 03/31/21  1138 04/01/21  0553 04/01/21  1151 04/02/21  0137 "   WBC 13.8*  --  18.5*  --  19.6*   RBC 3.42*  --  2.87*  --  3.10*   HEMOGLOBIN 8.4*   < > 7.3* 7.8* 8.0*   HEMATOCRIT 26.9*   < > 22.8* 24.6* 24.7*   MCV 78.7*  --  79.4*  --  79.7*   MCH 24.6*  --  25.4*  --  25.8*   PLATELETCT 232  --  231  --  243   MPV 9.8  --  9.5  --  9.4   NEUTSPOLYS 85.70*  --  86.40*  --  90.60*   LYMPHOCYTES 6.70*  --  5.80*  --  2.60*   MONOCYTES 6.80  --  6.10  --  4.30   EOSINOPHILS 0.10  --  0.60  --  2.50   BASOPHILS 0.10  --  0.20  --  0.00   RBCMORPHOLO Present  --   --   --  Present    < > = values in this interval not displayed.     Recent Labs     03/31/21 0022 04/01/21 0553 04/02/21 0137   SODIUM 136 136 141   POTASSIUM 4.7 4.1 4.1   CHLORIDE 107 104 108   CO2 23 25 27   BUN 13 9 9   CREATININE 1.01 0.69 0.68   CALCIUM 8.0* 7.9* 8.2*   ALBUMIN 2.0* 2.1* 2.1*     Estimated GFR/CRCL = Estimated Creatinine Clearance: 65.5 mL/min (by C-G formula based on SCr of 0.68 mg/dL).  Recent Labs     03/31/21 0022 04/01/21 0553 04/02/21  0137   GLUCOSE 157* 137* 113*     Recent Labs     03/31/21 0022 04/01/21  0553 04/02/21  0137   ASTSGOT 990* 307* 137*   ALTSGPT 869* 515* 374*   TBILIRUBIN 0.2 0.3 0.3   ALKPHOSPHAT 129* 157* 194*   GLOBULIN 2.7 2.9 3.3   AMMONIA 13  --   --              No results for input(s): INR, APTT, FIBRINOGEN in the last 72 hours.    Invalid input(s): DIMER    MICROBIOLOGY:   No results found for: BLOODCULTU, BLDCULT, BCHOLD     IMAGING:   EC-ECHOCARDIOGRAM COMPLETE W/ CONT   Final Result      CT-HEAD W/O   Final Result      Subacute left MCA infarct, with loss of gray-white matter differentiation in the left frontal lobe. No hemorrhagic transformation.      MR-BRAIN-WITH & W/O   Final Result   Addendum 1 of 1   Also noted in the left frontal calvarium is an area of enhancement in the    diploic space measuring approximately 11 mm which corresponds to the    location concerning for lytic lesion on the recent CT of the head. Finding    is concerning for  metastatic osseous    disease.      Final      1.  Moderate-sized left middle cerebral artery acute infarct with possible early hemorrhagic transformation.   2.  Scattered punctate and small acute infarcts involving the bilateral parieto-occipital regions and left cerebellar hemisphere.   3.  There is a pattern of mild supratentorial white matter disease with scattered foci of bright T2 and FLAIR signal in the subcortical and deep white matter of both hemispheres consistent with small vessel ischemic change versus demyelination or    gliosis. Mild diffuse cerebral substance loss.   4.  Mild microangiopathic ischemic change versus demyelination or gliosis.      Attempts to convey these findings to Dr. HERNANDEZ PALOMO were initiated on 3/31/2021 10:44 AM. Findings were discussed with Dr. Palomo on 3/31/2021 at 1056 hours.      CT-CEREBRAL PERFUSION ANALYSIS   Final Result      1.  Normal study. Cerebral blood flow less than 30% likely representing completed infarct = 0 mL.      2.  T Max more than 6 seconds likely representing combination of completed infarct and ischemia = 0 mL.      3.  Mismatched volume likely representing ischemic brain/penumbra = None      4.  Please note that the cerebral perfusion was performed on the limited brain tissue around the basal ganglia region. Infarct/ischemia outside the CT perfusion sections can be missed in this study.      CT-CTA NECK WITH & W/O-POST PROCESSING   Final Result      Beaded appearance of the cervical carotid and vertebral arteries most suggestive of fibromuscular dysplasia.      No significant stenosis or dissection.      Lytic lesion in the C7 vertebral body and the left frontal calvarium. Malignancy is concerning and must be excluded.      CT-CTA HEAD WITH & W/O-POST PROCESS   Final Result      Acute/subacute left MCA infarct.      No large vessel occlusion or hemodynamically significant stenosis.      Small lytic lesion in the left frontal calvaria  concerning for malignancy.      [id number] VIEW   Final Result         1.  Nonspecific bowel gas pattern.      US-EXTREMITY VENOUS LOWER BILAT   Final Result      MR-PELVIS-WITH & W/O AND SEQUENCES   Final Result         1. Stage IV endometrial cancer. The endometrial mass itself measures 13 cm, invading through the anterior uterine wall and uterine fundus.   2. Metastatic parametrial, external iliac and para-aortic lymphadenopathy.   3. Multiple pelvic osseous metastases.   4. Several uterine fibroids.            US-PELVIC TRANSABDOMINAL ONLY   Final Result      Enlarged, heterogeneous uterus could indicate underlying fibroids though malignancy cannot be excluded.      Endometrial stripe not visualized.      CT-CTA COMPLETE THORACOABDOMINAL AORTA   Final Result      No evidence of aortic aneurysm or dissection.      Partial visualization of a markedly enlarged heterogeneous uterus with hypervascularity concerning for malignancy versus fibroids.      Diverticulosis without evidence of diverticulitis.      Bilateral hydronephrosis and hydroureter likely related to enlarged uterus.      Nonobstructive left renal stone.      Bilateral pulmonary nodules measuring up to 5 mm.      Nodule recommendations:   Low Risk: No routine follow-up      High Risk: Optional CT at 12 months      Comments: Use most suspicious nodule as guide to management. Follow-up intervals may vary according to size and risk.      Low Risk - Minimal or absent history of smoking and of other known risk factors.      High Risk - History of smoking or of other known risk factors.      Note: These recommendations do not apply to lung cancer screening, patients with immunosuppression, or patients with known primary cancer.      Fleischner Society 2017 Guidelines for Management of Incidentally Detected Pulmonary Nodules in Adults      DX-CHEST-PORTABLE (1 VIEW)   Final Result      No acute cardiac or pulmonary abnormalities are identified.           "      CULTURES:   Results     Procedure Component Value Units Date/Time    URINE CULTURE(NEW) [[id number]] Collected: 03/27/21 1329    Order Status: Completed Specimen: Urine, Clean Catch Updated: 03/29/21 0832     Significant Indicator NEG     Source UR     Site URINE, CLEAN CATCH     Culture Result Usual urogenital marisa 10-50,000 cfu/mL    Narrative:      Indication for culture:->Patient WITHOUT an indwelling Beverly  catheter in place with new onset of Dysuria, Frequency,  Urgency, and/or Suprapubic pain  Indication for culture:->Patient WITHOUT an indwelling Beverly    URINALYSIS [[id number]]  (Abnormal) Collected: 03/27/21 1329    Order Status: Completed Specimen: Urine, Clean Catch Updated: 03/27/21 1359     Color Yellow     Character Cloudy     Specific Gravity 1.009     Ph 5.5     Glucose Negative mg/dL      Ketones Negative mg/dL      Protein 100 mg/dL      Bilirubin Negative     Urobilinogen, Urine 0.2     Nitrite Negative     Leukocyte Esterase Moderate     Occult Blood Large     Micro Urine Req Microscopic    Narrative:      Indication for culture:->Patient WITHOUT an indwelling Beverly  catheter in place with new onset of Dysuria, Frequency,  Urgency, and/or Suprapubic pain    SARS-CoV-2 PCR (24 hour In-House): Collect NP swab in [facility] [[id number]] Collected: 03/25/21 2340    Order Status: Completed Specimen: Respirate from Nasopharyngeal Updated: 03/26/21 1035     SARS-CoV-2 Source NP Swab     SARS-CoV-2 by PCR NotDetected     Comment: PATIENTS: Important information regarding your results and instructions can  be found at [email or web address]   \"After your  Covid-19 Test\"  [facility] providers: PLEASE REFER TO DE-ESCALATION AND RETESTING PROTOCOL  on [email or web address]  **The TaqPath COVID-19 SARS-CoV-2 RT-PCR test has been made available for  use under the Emergency Use Authorization (EUA) only.         Narrative:      Have you been in close contact with a person who is suspected  or " known to be positive for COVID-19 within the last 30 days  (e.g. last seen that person < 30 days ago)->Unknown          MEDS:  Current Facility-Administered Medications   Medication Last Admin   • nystatin (MYCOSTATIN) 088027 UNIT/ML suspension 500,000 Units     • lactated ringers infusion Rate Change at 04/01/21 1716   • oxyCODONE-acetaminophen (PERCOCET) 5-325 MG per tablet 1 tablet 1 tablet at 04/01/21 1705   • amLODIPine (NORVASC) tablet 5 mg 5 mg at 04/01/21 1839   • haloperidol lactate (HALDOL) injection 1 mg 1 mg at 03/31/21 2206   • Pharmacy Consult Request ...Pain Management Review 1 Each     • ondansetron (ZOFRAN) syringe/vial injection 4 mg 4 mg at 04/01/21 1705   • NS (BOLUS) infusion 500 mL     • prochlorperazine (COMPAZINE) injection 10 mg     • senna-docusate (PERICOLACE or SENOKOT S) 8.6-50 MG per tablet 2 tablet 2 tablet at 04/02/21 0536    And   • polyethylene glycol/lytes (MIRALAX) PACKET 1 Packet      And   • magnesium hydroxide (MILK OF MAGNESIA) suspension 30 mL      And   • bisacodyl (DULCOLAX) suppository 10 mg     • ondansetron (ZOFRAN ODT) dispertab 4 mg         ASSESSMENT/PLAN:  #Metastatic uterrine cancer, adenocarcinoma  S/p surgery including hysterectomy, oophorectomy, omentecomy 3/29/2021; POD # 4.  Pathology report has now confirmed evidence of metastasis to omentum, lymph nodes, and pelvic wall; there are possible bone mets, as well as brain mets with imaging revealinglesions of the C7 vertebral body as well as left frontal calvaria.  -Further recommendations per Gyn/Onc  -Palliative consult pending     #Acute left MCA infarct  Patient with development of expressive aphasia and confusion on 3/30/2021 with subsequent findings of acute infarction.  Per neurology note, pattern of infarct suspicious for cardioembolic phenomenon possibly related to hypercoagulability due to underlying cancer.  -Repeat CT head without contrast ruled out hemorrhagic conversion  -PT/OT/speech ordered  -FEES  pending per speech  -Echocardiogram ordered for possible cardioembolic origin of MRI brain findings; echo has now returned with EF 60%; mildly dilated LA; RVSP 29 mm Hg   -Overall prognosis is poor and would like to involve palliative care  -Patient will likely require postacute rehab for speech therapy  -Will advance diet today if cleared by speech (and stop IV fluids)  -Will start aspirin 81 mg daily, per neurology, if hemoglobin stays stable    #Acute transaminitis  Patient with acute rise in transaminases, suspected to be due to shock liver  -Ammonia level normal  -Holdin statin, Tylenol; hold all other hepatotoxins  -Continue to monitor; continues to trend downward today, with AST and  and 374, respectively     #Iron deficiency anemia due to chronic blood loss  Hemoglobin stable this AM at 8.0 from 7.8 yesterday at noon  Continue to monitor, transfuse as indicated for hemoglobin less than 7     #Hypokalemia  Continue to monitor, replace as indicated     #Hyperlipidemia  -Holding statin as above     #Essential hypertension  -Holding home amlodipine    #DVT Prophylaxis  -Per gynecology, OK to start lovenox DVT ppx once hemoglobin has stabilized     Core Measures:  DVT PPX: SCDs  ABX: None  Lines: PIV  Fluids: None  PCP: Shan Tuttle MD  CODE STATUS: Full     Dispo: Inpatient    Josue White MD   PGY-1 Family Medicine Resident   [facility]

## 2021-04-02 NOTE — CARE PLAN
Problem: Pain Management  Goal: Pain level will decrease to patient's comfort goal  Outcome: PROGRESSING AS EXPECTED     Problem: Communication  Goal: The ability to communicate needs accurately and effectively will improve  Outcome: PROGRESSING SLOWER THAN EXPECTED

## 2021-04-02 NOTE — PROGRESS NOTES
Oncology/Hematology Progress Note               Author: Diann Justice P.A.-C. Date & Time created: 4/2/2021  4:23 PM     Interval History:  Resting comfortably. Per RN, doing well, pain controlled, no nausea or vomiting. No bowel movement.      Physical Exam:  Physical Exam  Constitutional:       Appearance: Normal appearance.   HENT:      Head: Normocephalic and atraumatic.      Mouth/Throat:      Mouth: Mucous membranes are moist.   Cardiovascular:      Rate and Rhythm: Normal rate.   Pulmonary:      Effort: Pulmonary effort is normal.   Abdominal:      General: Abdomen is flat.   Musculoskeletal:         General: No swelling.   Skin:     General: Skin is warm and dry.         Labs:          Recent Labs     03/31/21 0022 04/01/21 0553 04/02/21 0137   SODIUM 136 136 141   POTASSIUM 4.7 4.1 4.1   CHLORIDE 107 104 108   CO2 23 25 27   BUN 13 9 9   CREATININE 1.01 0.69 0.68   CALCIUM 8.0* 7.9* 8.2*     Recent Labs     03/31/21 0022 04/01/21 0553 04/02/21 0137   ALTSGPT 869* 515* 374*   ASTSGOT 990* 307* 137*   ALKPHOSPHAT 129* 157* 194*   TBILIRUBIN 0.2 0.3 0.3   GLUCOSE 157* 137* 113*     Recent Labs     03/31/21 0022 03/31/21  1138 04/01/21  0553 04/01/21  0553 04/01/21  1151 04/02/21  0137 04/02/21  1405   RBC 3.42*  --  2.87*  --   --  3.10*  --    HEMOGLOBIN 8.4*   < > 7.3*   < > 7.8* 8.0* 8.1*   HEMATOCRIT 26.9*   < > 22.8*   < > 24.6* 24.7* 25.2*   PLATELETCT 232  --  231  --   --  243  --     < > = values in this interval not displayed.     Recent Labs     03/31/21 0022 04/01/21 0553 04/02/21 0137   WBC 13.8* 18.5* 19.6*   NEUTSPOLYS 85.70* 86.40* 90.60*   LYMPHOCYTES 6.70* 5.80* 2.60*   MONOCYTES 6.80 6.10 4.30   EOSINOPHILS 0.10 0.60 2.50   BASOPHILS 0.10 0.20 0.00   ASTSGOT 990* 307* 137*   ALTSGPT 869* 515* 374*   ALKPHOSPHAT 129* 157* 194*   TBILIRUBIN 0.2 0.3 0.3     Recent Labs     03/31/21  0022 04/01/21  0553 04/02/21  0137   SODIUM 136 136 141   POTASSIUM 4.7 4.1 4.1   CHLORIDE 107 104  108   CO2 23 25 27   GLUCOSE 157* 137* 113*   BUN 13 9 9   CREATININE 1.01 0.69 0.68   CALCIUM 8.0* 7.9* 8.2*     Hemodynamics:  Temp (24hrs), Av.7 °C (98 °F), Min:36.4 °C (97.6 °F), Max:37 °C (98.6 °F)  Temperature: 36.7 °C (98.1 °F)  Pulse  Av.5  Min: 73  Max: 120   Blood Pressure : 133/73     Respiratory:    Respiration: 16, Pulse Oximetry: 96 %        RUL Breath Sounds: Diminished, RML Breath Sounds: Diminished, RLL Breath Sounds: Diminished, KAREN Breath Sounds: Diminished, LLL Breath Sounds: Diminished  Fluids:    Intake/Output Summary (Last 24 hours) at 2021 1623  Last data filed at 2021 1526  Gross per 24 hour   Intake --   Output 3200 ml   Net -3200 ml     Weight: 64.5 kg (142 lb 3.2 oz)  GI/Nutrition:  Orders Placed This Encounter   Procedures   • Diet Order Diet: Level 3 - Liquidised; Liquid level: Level 2 - Mildly Thick; Tray Modifications (optional): SLP - OK per SLP, SLP - 1:1 Supervision by Nursing     Standing Status:   Standing     Number of Occurrences:   1     Order Specific Question:   Diet:     Answer:   Level 3 - Liquidised [26]     Order Specific Question:   Liquid level     Answer:   Level 2 - Mildly Thick     Order Specific Question:   Tray Modifications (optional)     Answer:   SLP - OK per SLP     Order Specific Question:   Tray Modifications (optional)     Answer:   SLP - 1:1 Supervision by Nursing     Medical Decision Making, by Problem:  Active Hospital Problems    Diagnosis    • *Metastatic endometrial cancer (HCC) [C54.1]    • Constipation [K59.00]    • Iron deficiency anemia due to chronic blood loss [D50.0]    • Hypokalemia [E87.6]    • Hyponatremia [E87.1]    • Peripheral edema [R60.9]    • Hyperlipidemia [E78.5]    • Essential hypertension [I10]    • Prediabetes [R73.03]    • Confusion [R41.0]        Plan:  72 y/o found to have a endometrial mass now s/p MACKENZIE/BSO/PALND/PLND/omentectomy/bilateral uretrolysis/peritoneal biopsies/cystotomy repair/bilateral ureteral  stent placement on 3/29/21     1. POD #4: found to have an enlarged 18 week uterus with serosal tumor involvement and peritoneal disease with densly adherent bladder, continue clear liquid diet w/advancement pending bowel fxn and SLP eval, KATIUSKA drain serosang, encourage ambulation, Needs stents x6 weeks, dunn x3 weeks.   2. Expressive aphasia: L MCA infarct, cardiothrombotic event, appreciate neurology assistance, repeat CTH w/ no change today, Hg down trending, will recheck in am and if stable start ASA per hospitalist recommendation.   3. Post op pain: MS PCA demand only to limit sedation  4. Intraop anemia: s/p 2 units intraop, hg 11 > 8> 7.3, stable today at 8   5. Leukocytosis: afebrile, likely reactive, recheck in am  6. Transaminitis: persistent, NH4 wnl, suspect d/t shock, downward trending, no tylenol  7. Endometrial mass: surgical findings and details d/w patient and boyfriend in detail, awaiting final path however clinical c/w carcinoma introperatively, further treatment will depend upon recovery course  8. Dysphagia: s/p SLP consult, FEES study recommends 1-1 feeding, continued speech therapy, and thickened liquids.   9. GI/FEN: Liquid diet, monitor lytes and replete PRN  10. Ppx: SCDs, lovenox ppx in AM if Hgb stable  11. Dispo: continue inpatient management for postop care and stroke eval    Case discussed with physician     Quality-Core Measures

## 2021-04-02 NOTE — DIETARY
"Nutrition services: Day 7 of admit.  Graciela Desai is a 71 y.o. female with admitting DX of endometrial cancer.   Consult received for NPO/clear liquid diet x5 days. Additionally RD noted pt reported unintentional wt loss pta.     Per RN pt \"still completely disoriented\" this morning. RD visited pt for visualization of nutrition focused physical exam. Did not attempt interview w/ pt re: wt loss/PO intake pta. Noted admit nutrition screen negative for wt loss or poor PO.     Assessment:  Height: 152.4 cm (5')  Admit Weight: 62.5 kg (137 lb 12.6 oz) via stand up scale on 3/26.   Most recent weight: 64.5 kg via bed scale on 4/1.   Body mass index is 27.77 kg/m²., BMI classification: overweight   Diet/Intake: clear liquids; % x8 clear liquid meals since 3/28.     Evaluation:   1. Per H&P pt presented w/ radiating back pain and chest pain. Found to have uterine mass, positive for malignancy. Also note pt reported unintentional wt loss.   2. Problem list includes: constipation, iron-deficiency anemia, confusion, pre-DM, HLD, peripheral edema, hyponatremia, hypokalemia  3. Pt is s/p total hysterectomy, salpingo-oophorectomy, and omentectomy on 3/29.    4. Pt noted w/ expressive aphasia on 3/30. CT found to have subacute left MCA infarct.   5. Per chart review wt was 165 lb in 5/2019 - this is a 14% wt loss in >1 year. This is notable though not significant for malnutrition.   6. Pt appeared well-nourished upon visual inspection of nutrition focused physical exam. No obvious signs of muscle or fat wasting noted. Upon review of drivers license issued in 2015 appearance does seem to be at baseline.   7. Meds: nystatin, pericolace, zofran prn, percocet prn  8. Labs:  WBC 19.6, hemoglobin 8.0, glucose 113, , , Alkaline phosphatase 194, A1c 5.0%  9. Per SLP on 3/31 pt safe to advance to pureed w/ thick liquids and recommending FEES study, which is pending at this time. RD messaged RN re: advancement of " diet, as pt remains on THIN clear liquids. Per RN will contact SLP regarding plan for FEES study and will contact MD about advancing diet >clears.  10. Per MD prognosis is poor - Palliative consult pending at this time.    Malnutrition Risk: criteria not met. At risk w/ metastatic cancer dx and recent wt loss.     Recommendations/Plan:  1. Advance diet >clear liquids/NPO as medically appropriate.   2. Diet texture per SLP. May consider adding THICK liquids restriction to clear liquid diet as indicated per SLP if unable to advance >clears. RD communicated concern to RN and SLP as well.   3. Encourage intake of meals.   4. Document intake of all meals as % taken in ADL's to provide interdisciplinary communication across all shifts.   5. Monitor weight.  6. Nutrition rep will continue to see patient for ongoing meal and snack preferences.   7. Obtain supplement order per RN as needed.     RD following for diet advancement and subsequent adequate PO intake.

## 2021-04-02 NOTE — PROGRESS NOTES
Bedside report received. POC discussed with pt; all questions answered at this time. Call light within reach, fall precautions in place. Patient observed in bed resting with family member at bedside. Initial shift assessment performed. Patient denied pain or discomfort. Patient with expressive aphasive, able to communicate minimal words. Alert and oriented x1 to self. No s/s apparent distress noted. All questions and concerned addressed.

## 2021-04-02 NOTE — CONSULTS
"Reason for PC Consult: Advance Care Planning    Consulted by: Dr. Nuno- [facility]; currently Dr. White    Assessment:  General: 72 y/o female from home with abdominal pain and ongoing weakness. Pt found to have larger uterine mass concerning for malignancy. Pt is s/p \"total abdominal hysterectomy with salpingo-oophorectomy, periaortic and pelvic lymph node dissection, omentectomy, bilateral ureterolysis with peritoneal biopsies, cystoscopy repair and bilateral ureteral stent placement on 3/29/2021.\" Graciela unfortunately suffered a left MCA stroke following surgery, no aphasic but appears to understand/follow conversations. Pathology consistent with metastatic endometrial adenocarcinoma. PMHx of HTN.    Social: Pt was residing with her SO Star in [place] prior to this admission. She was independent in function. Per Star, she has 3 kids who are estranged. He states they are unaware of there whereabouts.     Consults: gynecological oncology; neurology    Dyspnea: No-    Last BM: 04/01/21-    Pain: No-    Depression: Unable to determine-    Dementia: No;       Spiritual:  Is Gnosticism or spirituality important for coping with this illness? No-    Has a  or spiritual provider visit been requested? No    Palliative Performance Scale: 40%    Advance Directive: None-    DPOA: No-    POLST: No-      Code Status: Full-      Outcome:  Updates from Dr. White and BS RNs prior to meeting with Graciela dayton Star.    PC RN met with Becki at bedside and explained the role of PC to help with discussions about the current medical situation and goals moving forward.. Graciela was sleeping most of the visit but would wake up and attempt to answer questions. At times, she would speak the correct words, and at other times, repeat certain words. PC explained to Star the need to understand what options are available to Graciela from an oncology perspective, but also noting the impact of the stroke on this plan. He spoke of " "several cases of friends/family who \"have had cancer for years and are fine.\" PC explained no 2 cancer diagnoses are the same given how one's functional status can impact her ability to receive/undergo treatment. He expressed understanding. PC discussed meeting on Sunday when hopefully more information will be available. Plan made for this RN to reach out to Star in the AM to discuss a time to meet and he was agreeable.    While talking to Star, PC RN provided therapeutic communication, including open ended questions, reflective listening, and normalization of thought and feelings throughout encounter, as well as reinforced Graciela's goals of care. PC contact information provided to Star and encouraged to call with any questions or concerns.     Updates to MD with plan to revisit Sunday. PC discussed possible cognitive eval with SLP to evaluate pt's decision making abilities given her children are estranged and Star is not a legal spouse.     Recommendations: I do not recommend an ethics or hospice consult at this time because awaiting clarification from oncology re: treatment options to best assist with POC.    Updated: MD/RN    Plan: f/u Sunday with Graciela and Star    Thank you for allowing Palliative Care to support this patient and family. Contact [phone] for additional assistance, change in patient status, or with any questions/concerns.   "

## 2021-04-02 NOTE — DISCHARGE PLANNING
Anticipated Discharge Disposition: TBD    Action: Patient discussed in IDT rounds. Patient is pending consult with onc and palliative to determine POC. PT/OT recommending post acute placement. Will hold off on sending any referrals until POC is determined.    Barriers to Discharge: medical clearance, POC TBD    Plan: Case coordination to continue to follow for discharge planning support

## 2021-04-02 NOTE — THERAPY
Speech Language Pathology   Fiberoptic Endoscopic Evaluation of Swallow     Patient Name: Graciela Desai  AGE:  71 y.o., SEX:  female  Medical Record #: [id number]  Today's Date: 4/2/2021     Precautions  Precautions: (P) Fall Risk, Swallow Precautions ( See Comments)  Comments: (P) acute CVA, L MCA, bilat parieto-occipital, Left cerebellar    Assessment    Patient is 71 y.o. female with a diagnosis of metastatic endometrial cancer and CVA. Per EMR= is a 71 yr old admitted 3/25/21 with epigastric and back pain, with diagnoses of anemia, essential HTN, uterine mass post surgery for metastatic endometrial cancer 3/29/21.  AMS 3/30/21 with new onset expressive aphasia without extremity weakness. MRI 3/31 reveals moderate sized L MCA acute infarct with possible early hemorrhagic transformation, scattered punctate and sm acute infarcts bilateral parieto- occ and L cerebellar regions.  3/25 chest :negative for acute process. Currently with clear liquid, KATIUSKA drain, and uretal stents. See EMR for additional medical information. Pt with complex medical status.    Per discussion with RN, resident medical group approving FEES for today and okay for food per FEES results. Discussed with the patient the risks, benefits, and alternatives of the FEES procedure. Patient acknowledges and agreeable to proceed with the procedure. Pt tolerated the procedure without difficulty. Pt with notched epiglottis with small notch to the left of midline with no s/s of trauma. Pt with probable apraxia and did not follow directives for voicing. Right arytenoid moving more than left. She was able to follow directives for a double or dry swallow. Pt assessed with single ice chips, NTL, thin milk, applesauce, and pudding. Pt has been on thin liquid clear texture. Pt with mild to moderate vallecular and pyriform sinus residue, greater on the left versus the right, with all textures. Fair to good clearance with repeat swallows. Silent deep penetration,  near the airway and at the right aryepiglottic area, with thin milk from the spoon. Pt also with greater and scattered residue with thin milk from the spoon. Trace aspiration with thin milk at the anterior fissure that was silent. Pt with high silent penetration, to the upper 1/3 tip of epiglottis and at the glottal surface, with applesauce. Overall, pt presenting with mild to moderate dysphagia on assessed textures and recommended for LQ3/MT2 with 1-1 feeding and use of posted and recommended swallow strategies. SLP collaborated with pt's two nurses regarding the FEES. One of the RN's was present during the FEES.     Additional factors influencing patient status/progress: pt with complicated medical status and with aphasia.     Plan    Recommend Speech Therapy 5 times per week until therapy goals are met for the following treatments:  Dysphagia Training.    Discharge Recommendations: (P) Recommend post-acute placement for additional speech therapy services prior to discharge home       04/02/21 1201   FEES Evaluation Prior To Procedure   Respiratory Status Room Air   Onset Date Of Dysphagia   (since admit)   Dysphagia Symptoms Warranting Video Swallow Pt with acute CVA s/s and aphasia   Procedure Performed While Patient Sitting In Bed   Seated at (Degrees) 90   A Flexible Endoscope Was Introduced Transnasally In The Left Nare   FEES  Anatomy Assessment   Anatomy For A Functional Swallow: Other (Comments)  (notched epiglottis w/small notch to left of midline at tip)   FEES Laryngeal Adduction Assessment   Breath Holding Other (Comments)  (UTT r/t ahasia and probable apraxia)   Clearing Throat   (UTT)   Cough   (UTT)   Phonation   (UTT)   Onset Of Swallow Adequate   FEES Velopharyngeal Assessment    Velopharyngeal Closure:   (NT)   FEES Voice Assessment    Voice:   (Voice WNL in quality w/low intensity)   FEES Sensation Assessment    Presence of Scope Inadequate   Presence of Residue Adequate   Presence of  Penetration Absent Response   Presence of Aspiration Absent Response   FEES Swallow Function Assessment   Swallow Trigger Level of the Valleculae;Level of the Pyriform Sinuses   Base of Tongue Retraction Impaired   Pharyngeal Constrictor Movement Impaired   White Out Phase Complete White Out   Epiglottic Movement Functional   Laryngeal Elevation   (folds adduct prior to swallow)   Cricopharyngeal Function Functional   Swallow Observations / Symptoms   Swallow Observations / Symptoms Yes   Vallecular Residue Mildly Thick (2) - (Nectar Thick);Thin (0);Liquidised (3);Pureed (4)   Mildly Thick (2) - (Nectar Thick) Teaspoon;Cup   Thin (0) Teaspoon   Pyriform Sinus Residue Mildly Thick (2) - (Nectar Thick);Thin (0);Liquidised (3);Pureed (4)   Mildly Thick (2) - (Nectar Thick) Cup;Teaspoon   Thin (0) Teaspoon   Penetration Before the Swallow Thin (0);Pureed (4)   Thin (0) Teaspoon   Penetration Suspected During the Swallow Thin (0)   Thin (0) Teaspoon   Aspiration Suspected During the Swallow Thin (0)   Compensatory Strategies Attempted   Compensatory Strategies Attempted Yes   Penetration Aspiration Scale   Penetration Aspiration Scale 3 - Material remains above vocal folds, visible stasis remains   [place] Pharyngeal Residue Severity   Vallecular Residue Mild, epiglottic ligament visable   Pyriform Sinus Residue  Moderate, up wall to ½ full   Dysphagia Rating   Nutritional Liquid Intake Rating Scale Thickened beverages (mildly thick unless otherwise specified)   Nutritional Food Intake Rating Scale Total oral diet of a single consistency   Problem List   Problem List Dysphagia   Evaluation Recommendations   Swallow Evaluations Recommendations (Yes / No)   (f/u FEES as needed)   Diet / Liquid Recommendation Liquidised (3) - (Nectar Thick Full Liquid);Mildly Thick (2) - (Nectar Thick)   Medication Administration  Crush all Medications in Puree   Evaluation Recommended Techniques   Swallow Techniques Yes   Techniques Oral  Stage Other (Comment)  (sitting up at 90 degrees, 1-1 feeding, slow rate, 1/2 tspamt)   Techniques Pharyngeal Phase Thicken Liquids For Better Control Of Bolus To Consistency Of Nectar;Repeat Swallow 2-3 Times On Each Bolus To Clear Residue;Pacing:Control Amount Of Presentation;Pacing:Control Rate Of Presentation   Short Term Goals   Short Term Goal # 1 4/2 revised Pt will consume LQ3/MT2 with 1-1 feeding and use of posted and recommended swallow strategies.    Session Information   Priority 3  (5x,MetendometrialCA/acuteCVA,FEES-LQ3/MT2-hi f/u)

## 2021-04-02 NOTE — PROGRESS NOTES
Assumed care of pt. Bedside report received from night RN Jayy. Pt was updated on plan of care. Call light, phone and personal belongings within reach. Bed locked in lowest position, 3 side rails up, bed alarm on and working appropriately. Pt is aphasic, no indications of pain at this time.

## 2021-04-03 LAB
ALBUMIN SERPL BCP-MCNC: 1.8 G/DL (ref 3.2–4.9)
ALBUMIN/GLOB SERPL: 0.5 G/DL
ALP SERPL-CCNC: 243 U/L (ref 30–99)
ALT SERPL-CCNC: 244 U/L (ref 2–50)
ANION GAP SERPL CALC-SCNC: 8 MMOL/L (ref 7–16)
ANISOCYTOSIS BLD QL SMEAR: ABNORMAL
AST SERPL-CCNC: 123 U/L (ref 12–45)
BASOPHILS # BLD AUTO: 0 % (ref 0–1.8)
BASOPHILS # BLD: 0 K/UL (ref 0–0.12)
BILIRUB SERPL-MCNC: 0.2 MG/DL (ref 0.1–1.5)
BUN SERPL-MCNC: 9 MG/DL (ref 8–22)
CALCIUM SERPL-MCNC: 7.9 MG/DL (ref 8.5–10.5)
CHLORIDE SERPL-SCNC: 102 MMOL/L (ref 96–112)
CO2 SERPL-SCNC: 24 MMOL/L (ref 20–33)
CREAT SERPL-MCNC: 0.61 MG/DL (ref 0.5–1.4)
EOSINOPHIL # BLD AUTO: 0 K/UL (ref 0–0.51)
EOSINOPHIL NFR BLD: 0 % (ref 0–6.9)
GLOBULIN SER CALC-MCNC: 3.8 G/DL (ref 1.9–3.5)
GLUCOSE SERPL-MCNC: 126 MG/DL (ref 65–99)
HCT VFR BLD AUTO: 21.8 % (ref 37–47)
HCT VFR BLD AUTO: 24.3 % (ref 37–47)
HGB BLD-MCNC: 7.3 G/DL (ref 12–16)
HGB BLD-MCNC: 7.8 G/DL (ref 12–16)
HYPOCHROMIA BLD QL SMEAR: ABNORMAL
LYMPHOCYTES # BLD AUTO: 1.1 K/UL (ref 1–4.8)
LYMPHOCYTES NFR BLD: 6.1 % (ref 22–41)
MACROCYTES BLD QL SMEAR: ABNORMAL
MANUAL DIFF BLD: NORMAL
MCH RBC QN AUTO: 25.6 PG (ref 27–33)
MCHC RBC AUTO-ENTMCNC: 33.5 G/DL (ref 33.6–35)
MCV RBC AUTO: 76.5 FL (ref 81.4–97.8)
METAMYELOCYTES NFR BLD MANUAL: 0.9 %
MICROCYTES BLD QL SMEAR: ABNORMAL
MONOCYTES # BLD AUTO: 0.63 K/UL (ref 0–0.85)
MONOCYTES NFR BLD AUTO: 3.5 % (ref 0–13.4)
MORPHOLOGY BLD-IMP: NORMAL
NEUTROPHILS # BLD AUTO: 16.2 K/UL (ref 2–7.15)
NEUTROPHILS NFR BLD: 89.5 % (ref 44–72)
NRBC # BLD AUTO: 0.02 K/UL
NRBC BLD-RTO: 0.1 /100 WBC
PLATELET # BLD AUTO: 298 K/UL (ref 164–446)
PLATELET BLD QL SMEAR: NORMAL
PMV BLD AUTO: 8.9 FL (ref 9–12.9)
POIKILOCYTOSIS BLD QL SMEAR: NORMAL
POLYCHROMASIA BLD QL SMEAR: NORMAL
POTASSIUM SERPL-SCNC: 3.9 MMOL/L (ref 3.6–5.5)
PROT SERPL-MCNC: 5.6 G/DL (ref 6–8.2)
RBC # BLD AUTO: 2.85 M/UL (ref 4.2–5.4)
RBC BLD AUTO: PRESENT
SODIUM SERPL-SCNC: 134 MMOL/L (ref 135–145)
TARGETS BLD QL SMEAR: NORMAL
WBC # BLD AUTO: 18.1 K/UL (ref 4.8–10.8)

## 2021-04-03 PROCEDURE — 770020 HCHG ROOM/CARE - TELE (206)

## 2021-04-03 PROCEDURE — 700102 HCHG RX REV CODE 250 W/ 637 OVERRIDE(OP): Performed by: STUDENT IN AN ORGANIZED HEALTH CARE EDUCATION/TRAINING PROGRAM

## 2021-04-03 PROCEDURE — A9270 NON-COVERED ITEM OR SERVICE: HCPCS | Performed by: STUDENT IN AN ORGANIZED HEALTH CARE EDUCATION/TRAINING PROGRAM

## 2021-04-03 PROCEDURE — 36415 COLL VENOUS BLD VENIPUNCTURE: CPT

## 2021-04-03 PROCEDURE — 700102 HCHG RX REV CODE 250 W/ 637 OVERRIDE(OP): Performed by: NURSE PRACTITIONER

## 2021-04-03 PROCEDURE — 80053 COMPREHEN METABOLIC PANEL: CPT

## 2021-04-03 PROCEDURE — 700111 HCHG RX REV CODE 636 W/ 250 OVERRIDE (IP): Performed by: STUDENT IN AN ORGANIZED HEALTH CARE EDUCATION/TRAINING PROGRAM

## 2021-04-03 PROCEDURE — 85027 COMPLETE CBC AUTOMATED: CPT

## 2021-04-03 PROCEDURE — 85007 BL SMEAR W/DIFF WBC COUNT: CPT

## 2021-04-03 PROCEDURE — 85014 HEMATOCRIT: CPT

## 2021-04-03 PROCEDURE — A9270 NON-COVERED ITEM OR SERVICE: HCPCS | Performed by: NURSE PRACTITIONER

## 2021-04-03 PROCEDURE — 85018 HEMOGLOBIN: CPT

## 2021-04-03 RX ADMIN — ASPIRIN 81 MG: 81 TABLET, COATED ORAL at 05:06

## 2021-04-03 RX ADMIN — NYSTATIN 500000 UNITS: 100000 SUSPENSION ORAL at 17:17

## 2021-04-03 RX ADMIN — NYSTATIN 500000 UNITS: 100000 SUSPENSION ORAL at 19:48

## 2021-04-03 RX ADMIN — DOCUSATE SODIUM 50 MG AND SENNOSIDES 8.6 MG 2 TABLET: 8.6; 5 TABLET, FILM COATED ORAL at 17:16

## 2021-04-03 RX ADMIN — NYSTATIN 500000 UNITS: 100000 SUSPENSION ORAL at 09:00

## 2021-04-03 RX ADMIN — OXYCODONE HYDROCHLORIDE AND ACETAMINOPHEN 1 TABLET: 5; 325 TABLET ORAL at 13:58

## 2021-04-03 RX ADMIN — AMLODIPINE BESYLATE 5 MG: 5 TABLET ORAL at 17:17

## 2021-04-03 RX ADMIN — DOCUSATE SODIUM 50 MG AND SENNOSIDES 8.6 MG 2 TABLET: 8.6; 5 TABLET, FILM COATED ORAL at 05:06

## 2021-04-03 RX ADMIN — NYSTATIN 500000 UNITS: 100000 SUSPENSION ORAL at 13:53

## 2021-04-03 RX ADMIN — ENOXAPARIN SODIUM 30 MG: 30 INJECTION SUBCUTANEOUS at 05:06

## 2021-04-03 ASSESSMENT — PAIN DESCRIPTION - PAIN TYPE
TYPE: ACUTE PAIN

## 2021-04-03 ASSESSMENT — FIBROSIS 4 INDEX: FIB4 SCORE: 1.82

## 2021-04-03 NOTE — NON-PROVIDER
[facility]GRESS NOTE     ** THIS IS A MEDICAL STUDENT NOTE - DO NOT USE **    Date of Admission: 3/25/2021  Attending: Autumn Salcedo MD  Senior Resident: Jany Nuno MD (PGY-3)  Matthew Resident: Josue White MD (PGY-1)  Medical Student: Annalise Espinoza (MS3)    Patient: Graciela Desai; [id number]; 1950    ID: 71 y.o. female admitted for uterine mass, suspected malignancy, now s/p total abdominal hysterectomy with salpingo-oophorectomy, periaortic and pelvic lymph node dissection, omentectomy, bilateral ureterolysis with peritoneal biopsies, cystoscopy repair and bilateral ureteral stent placement on 3/29/2021    Chief Complaint:   Chief Complaint   Patient presents with    Chest Pain     mid epigastric    Back Pain     radiating from right scapula to lower back       SUBJECTIVE:  No acute events overnight. Patient and  met with palliative care yesterday. Pt sleeping on exam. No current concerns at this time from nursing staff.    OBJECTIVE:  Temp:  [36.1 °C (97 °F)-36.8 °C (98.2 °F)] 36.5 °C (97.7 °F)  Pulse:  [] 96  Resp:  [16] 16  BP: (122-142)/(63-80) 142/77  SpO2:  [95 %-99 %] 95 %    Intake/Output Summary (Last 24 hours) at 4/3/2021 0720  Last data filed at 4/3/2021 0458  Gross per 24 hour   Intake --   Output 4110 ml   Net -4110 ml       PE:  General: Well appearing female in no acute distress, resting on arrival to room  HEENT: Normocephalic, atraumatic  Cardiovascular: RRR, no murmurs, gallops, or rubs  Pulmonary: CTAB, symmetrical chest expansion, no rales, rhonchi, or wheezes  Abdominal: Normoactive bowel sounds, non-tender to palpation, no guarding, rigidity, or distension  Extremities: No pedal edema  Neurological: Sleeping    Diagnostic Studies and Procedures:    Labs:   Recent Results (from the past 24 hour(s))   HEMOGLOBIN AND HEMATOCRIT    Collection Time: 04/02/21  2:05 PM   Result Value Ref Range    Hemoglobin 8.1 (L) 12.0 - 16.0 g/dL    Hematocrit 25.2 (L) 37.0 -  47.0 %   CBC WITH DIFFERENTIAL    Collection Time: 04/03/21  2:38 AM   Result Value Ref Range    WBC 18.1 (H) 4.8 - 10.8 K/uL    RBC 2.85 (L) 4.20 - 5.40 M/uL    Hemoglobin 7.3 (L) 12.0 - 16.0 g/dL    Hematocrit 21.8 (L) 37.0 - 47.0 %    MCV 76.5 (L) 81.4 - 97.8 fL    MCH 25.6 (L) 27.0 - 33.0 pg    MCHC 33.5 (L) 33.6 - 35.0 g/dL    Platelet Count 298 164 - 446 K/uL    MPV 8.9 (L) 9.0 - 12.9 fL    Neutrophils-Polys 89.50 (H) 44.00 - 72.00 %    Lymphocytes 6.10 (L) 22.00 - 41.00 %    Monocytes 3.50 0.00 - 13.40 %    Eosinophils 0.00 0.00 - 6.90 %    Basophils 0.00 0.00 - 1.80 %    Nucleated RBC 0.10 /100 WBC    Neutrophils (Absolute) 16.20 (H) 2.00 - 7.15 K/uL    Lymphs (Absolute) 1.10 1.00 - 4.80 K/uL    Monos (Absolute) 0.63 0.00 - 0.85 K/uL    Eos (Absolute) 0.00 0.00 - 0.51 K/uL    Baso (Absolute) 0.00 0.00 - 0.12 K/uL    NRBC (Absolute) 0.02 K/uL    Hypochromia 2+ (A)     Anisocytosis 3+ (A)     Macrocytosis 1+     Microcytosis 2+ (A)    Comp Metabolic Panel    Collection Time: 04/03/21  2:38 AM   Result Value Ref Range    Sodium 134 (L) 135 - 145 mmol/L    Potassium 3.9 3.6 - 5.5 mmol/L    Chloride 102 96 - 112 mmol/L    Co2 24 20 - 33 mmol/L    Anion Gap 8.0 7.0 - 16.0    Glucose 126 (H) 65 - 99 mg/dL    Bun 9 8 - 22 mg/dL    Creatinine 0.61 0.50 - 1.40 mg/dL    Calcium 7.9 (L) 8.5 - 10.5 mg/dL    AST(SGOT) 123 (H) 12 - 45 U/L    ALT(SGPT) 244 (H) 2 - 50 U/L    Alkaline Phosphatase 243 (H) 30 - 99 U/L    Total Bilirubin 0.2 0.1 - 1.5 mg/dL    Albumin 1.8 (L) 3.2 - 4.9 g/dL    Total Protein 5.6 (L) 6.0 - 8.2 g/dL    Globulin 3.8 (H) 1.9 - 3.5 g/dL    A-G Ratio 0.5 g/dL   ESTIMATED GFR    Collection Time: 04/03/21  2:38 AM   Result Value Ref Range    GFR If African American >60 >60 mL/min/1.73 m 2    GFR If Non African American >60 >60 mL/min/1.73 m 2   PLATELET ESTIMATE    Collection Time: 04/03/21  2:38 AM   Result Value Ref Range    Plt Estimation Normal    MORPHOLOGY    Collection Time: 04/03/21  2:38 AM      Result Value Ref Range    RBC Morphology Present     Polychromia 3+     Poikilocytosis 1+     Target Cells 1+    PERIPHERAL SMEAR REVIEW    Collection Time: 04/03/21  2:38 AM   Result Value Ref Range    Peripheral Smear Review see below    DIFFERENTIAL MANUAL    Collection Time: 04/03/21  2:38 AM   Result Value Ref Range    Metamyelocytes 0.90 %    Manual Diff Status PERFORMED      Imaging:  EC-ECHOCARDIOGRAM COMPLETE W/ CONT   Final Result      CT-HEAD W/O   Final Result      Subacute left MCA infarct, with loss of gray-white matter differentiation in the left frontal lobe. No hemorrhagic transformation.      MR-BRAIN-WITH & W/O   Final Result   Addendum 1 of 1   Also noted in the left frontal calvarium is an area of enhancement in the    diploic space measuring approximately 11 mm which corresponds to the    location concerning for lytic lesion on the recent CT of the head. Finding    is concerning for metastatic osseous    disease.      Final      1.  Moderate-sized left middle cerebral artery acute infarct with possible early hemorrhagic transformation.   2.  Scattered punctate and small acute infarcts involving the bilateral parieto-occipital regions and left cerebellar hemisphere.   3.  There is a pattern of mild supratentorial white matter disease with scattered foci of bright T2 and FLAIR signal in the subcortical and deep white matter of both hemispheres consistent with small vessel ischemic change versus demyelination or    gliosis. Mild diffuse cerebral substance loss.   4.  Mild microangiopathic ischemic change versus demyelination or gliosis.      Attempts to convey these findings to Dr. HERNANDEZ PALOMO were initiated on 3/31/2021 10:44 AM. Findings were discussed with Dr. Palomo on 3/31/2021 at 1056 hours.      CT-CEREBRAL PERFUSION ANALYSIS   Final Result      1.  Normal study. Cerebral blood flow less than 30% likely representing completed infarct = 0 mL.      2.  T Max more than 6 seconds  likely representing combination of completed infarct and ischemia = 0 mL.      3.  Mismatched volume likely representing ischemic brain/penumbra = None      4.  Please note that the cerebral perfusion was performed on the limited brain tissue around the basal ganglia region. Infarct/ischemia outside the CT perfusion sections can be missed in this study.      CT-CTA NECK WITH & W/O-POST PROCESSING   Final Result      Beaded appearance of the cervical carotid and vertebral arteries most suggestive of fibromuscular dysplasia.      No significant stenosis or dissection.      Lytic lesion in the C7 vertebral body and the left frontal calvarium. Malignancy is concerning and must be excluded.      CT-CTA HEAD WITH & W/O-POST PROCESS   Final Result      Acute/subacute left MCA infarct.      No large vessel occlusion or hemodynamically significant stenosis.      Small lytic lesion in the left frontal calvaria concerning for malignancy.      [id number] VIEW   Final Result         1.  Nonspecific bowel gas pattern.      US-EXTREMITY VENOUS LOWER BILAT   Final Result      MR-PELVIS-WITH & W/O AND SEQUENCES   Final Result         1. Stage IV endometrial cancer. The endometrial mass itself measures 13 cm, invading through the anterior uterine wall and uterine fundus.   2. Metastatic parametrial, external iliac and para-aortic lymphadenopathy.   3. Multiple pelvic osseous metastases.   4. Several uterine fibroids.            US-PELVIC TRANSABDOMINAL ONLY   Final Result      Enlarged, heterogeneous uterus could indicate underlying fibroids though malignancy cannot be excluded.      Endometrial stripe not visualized.      CT-CTA COMPLETE THORACOABDOMINAL AORTA   Final Result      No evidence of aortic aneurysm or dissection.      Partial visualization of a markedly enlarged heterogeneous uterus with hypervascularity concerning for malignancy versus fibroids.      Diverticulosis without evidence of diverticulitis.      Bilateral  hydronephrosis and hydroureter likely related to enlarged uterus.      Nonobstructive left renal stone.      Bilateral pulmonary nodules measuring up to 5 mm.      Nodule recommendations:   Low Risk: No routine follow-up      High Risk: Optional CT at 12 months      Comments: Use most suspicious nodule as guide to management. Follow-up intervals may vary according to size and risk.      Low Risk - Minimal or absent history of smoking and of other known risk factors.      High Risk - History of smoking or of other known risk factors.      Note: These recommendations do not apply to lung cancer screening, patients with immunosuppression, or patients with known primary cancer.      Fleischner Society 2017 Guidelines for Management of Incidentally Detected Pulmonary Nodules in Adults      DX-CHEST-PORTABLE (1 VIEW)   Final Result      No acute cardiac or pulmonary abnormalities are identified.          ** THIS IS A MEDICAL STUDENT NOTE - DO NOT USE **    ASSESSMENT & PLAN:  Graciela Desai is a 70yo female with history of irregular vaginal bleeding and suspected uterine malignancy, admitted 3/25/2021 for concern for uterine malignancy now s/p total abdominal hysterectomy with salpingo-oophorectomy, periaortic and pelvic lymph node dissection, omentectomy, bilateral ureterolysis with peritoneal biopsies, cystoscopy repair and bilateral ureteral stent placement on 3/29/2021. Pt was transfered to telemetry on 3/30/2021 following development of confusion and aphasia suspicious for new stroke.    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  Evidence of metastasis on pathology report to omentum, lymph nodes, possible bone mets and brain mets on imaging- seen in C7 vertebral body and left  frontal calvaria.  - Gyn/onc following. Per recommendations stents needed for 6 weeks and dunn needed for 3 weeks. Awaiting final path results although presentation is consistent with carcinoma.  - Palliative care following. F/u meeting planned for Sunday with Becki    # Acute left MCA infarct  Patient developed expressive aphasia and confusion on 3/30/2021 with subsequent findings of acute MCA infarction. Per neurology, pattern of infarct suspicious for cardioembolic phenomenon possibly related to hypercoagulability due to underlying cancer. Repeat head CT completed 3/31 ruled out hemorrhagic conversion. Echo 4/2 showed no evidence of thrombus with EF 55-60%; mildly dilated LA; RVSP 29 mm Hg.  - PT/OT/speech following  - Palliative care following. Meeting planned for Sunday.     # Acute transaminitis  Improving. Acute rise in transaminases, suspect due to shock liver. Down trending AST/ALT- 123/244 today. Ammonia level wnl.  - Hold hepatotoxins including statin and Tylenol  - CTM     # Iron deficiency anemia due to chronic blood loss  Hemoglobin stable between 7.3-8.1 over the last 48hrs.   - CTM q12hrs  - Transfusion indicated if Hb< 7      # Hypokalemia  Stable. 3.9 today.   - Continue to monitor, replace as indicated     # Hyperlipidemia  -Holding statin due to potential hepatotoxicity.     # Prediabetes  # Essential hypertension  - Continue home amlodipine  - SSI    # Diet / Fluids  - Diet per speech pathology recommendations    # Bowel Regimen  - Senna/docusate, polyethylene glycol, milk of magnesia, bisacodyl PRN     # DVT Prophylaxis  - SCDs    Disposition: Inpatient for continued monitoring and medical management.    Annalise Espinoza, MS3  Medical Student

## 2021-04-03 NOTE — PROGRESS NOTES
"  [facility] [place] [facility] PROGRESS NOTE     Attending:   Dr. Salcedo    Resident:   Josue White MD    PATIENT:   Graciela Desai; [id number]; 1950    SUBJECTIVE:   No acute events overnight. The patient is sleeping this morning and awakens on command. She maintains her baseline mental status and is AO x 1.    OBJECTIVE:  Vitals:    04/02/21 1922 04/02/21 2329 04/03/21 0349 04/03/21 0727   BP: 132/75 140/76 142/77 134/73   Pulse:  (!) 101 96 96   Resp: 16 16 16 16   Temp: 36.1 °C (97 °F) 36.8 °C (98.2 °F) 36.5 °C (97.7 °F) 36.3 °C (97.3 °F)   TempSrc: Temporal Temporal Temporal Temporal   SpO2: 97% 98% 95% 97%   Weight: 64 kg (141 lb 1.5 oz)      Height:           Intake/Output Summary (Last 24 hours) at 4/3/2021 0949  Last data filed at 4/3/2021 0458  Gross per 24 hour   Intake --   Output 4110 ml   Net -4110 ml       PHYSICAL EXAM:   General: No acute distress, afebrile, resting comfortably, nods \"yes\" and \"no\" to questions  HEENT: NC/AT. EOMI; oral thrush remains present  Cardiovascular: RRR without murmurs, rubs, heaves. Normal capillary refill   Respiratory: CTAB, no tachypnea or retractions   Abdomen: normal bowel sounds, midline vertical surgical wound healing well without discharge, erythema, edema; soft, nontender, nondistended, no masses, no organomegaly   EXT:  SCOTT, no edema  Skin: No erythema/lesions   Neuro: Non-focal, alert and orientated x 1      LABS:  Recent Labs     04/01/21  0553 04/01/21  1151 04/02/21  0137 04/02/21  1405 04/03/21  0238   WBC 18.5*  --  19.6*  --  18.1*   RBC 2.87*  --  3.10*  --  2.85*   HEMOGLOBIN 7.3*   < > 8.0* 8.1* 7.3*   HEMATOCRIT 22.8*   < > 24.7* 25.2* 21.8*   MCV 79.4*  --  79.7*  --  76.5*   MCH 25.4*  --  25.8*  --  25.6*   PLATELETCT 231  --  243  --  298   MPV 9.5  --  9.4  --  8.9*   NEUTSPOLYS 86.40*  --  90.60*  --  89.50*   LYMPHOCYTES 5.80*  --  2.60*  --  6.10*   MONOCYTES 6.10  --  4.30  --  3.50   EOSINOPHILS 0.60  --  2.50  --  0.00   BASOPHILS 0.20  --  " 0.00  --  0.00   RBCMORPHOLO  --   --  Present  --  Present    < > = values in this interval not displayed.     Recent Labs     04/01/21  0553 04/02/21 0137 04/03/21  0238   SODIUM 136 141 134*   POTASSIUM 4.1 4.1 3.9   CHLORIDE 104 108 102   CO2 25 27 24   BUN 9 9 9   CREATININE 0.69 0.68 0.61   CALCIUM 7.9* 8.2* 7.9*   ALBUMIN 2.1* 2.1* 1.8*     Estimated GFR/CRCL = Estimated Creatinine Clearance: 72.7 mL/min (by C-G formula based on SCr of 0.61 mg/dL).  Recent Labs     04/01/21 0553 04/02/21 0137 04/03/21  0238   GLUCOSE 137* 113* 126*     Recent Labs     04/01/21 0553 04/02/21 0137 04/03/21  0238   ASTSGOT 307* 137* 123*   ALTSGPT 515* 374* 244*   TBILIRUBIN 0.3 0.3 0.2   ALKPHOSPHAT 157* 194* 243*   GLOBULIN 2.9 3.3 3.8*             No results for input(s): INR, APTT, FIBRINOGEN in the last 72 hours.    Invalid input(s): DIMER    MICROBIOLOGY:  No results found for: BLOODCULTU, BLDCULT, BCHOLD     IMAGING:   EC-ECHOCARDIOGRAM COMPLETE W/ CONT   Final Result      CT-HEAD W/O   Final Result      Subacute left MCA infarct, with loss of gray-white matter differentiation in the left frontal lobe. No hemorrhagic transformation.      MR-BRAIN-WITH & W/O   Final Result   Addendum 1 of 1   Also noted in the left frontal calvarium is an area of enhancement in the    diploic space measuring approximately 11 mm which corresponds to the    location concerning for lytic lesion on the recent CT of the head. Finding    is concerning for metastatic osseous    disease.      Final      1.  Moderate-sized left middle cerebral artery acute infarct with possible early hemorrhagic transformation.   2.  Scattered punctate and small acute infarcts involving the bilateral parieto-occipital regions and left cerebellar hemisphere.   3.  There is a pattern of mild supratentorial white matter disease with scattered foci of bright T2 and FLAIR signal in the subcortical and deep white matter of both hemispheres consistent with small  vessel ischemic change versus demyelination or    gliosis. Mild diffuse cerebral substance loss.   4.  Mild microangiopathic ischemic change versus demyelination or gliosis.      Attempts to convey these findings to Dr. HERNANDEZ PALOMO were initiated on 3/31/2021 10:44 AM. Findings were discussed with Dr. Palomo on 3/31/2021 at 1056 hours.      CT-CEREBRAL PERFUSION ANALYSIS   Final Result      1.  Normal study. Cerebral blood flow less than 30% likely representing completed infarct = 0 mL.      2.  T Max more than 6 seconds likely representing combination of completed infarct and ischemia = 0 mL.      3.  Mismatched volume likely representing ischemic brain/penumbra = None      4.  Please note that the cerebral perfusion was performed on the limited brain tissue around the basal ganglia region. Infarct/ischemia outside the CT perfusion sections can be missed in this study.      CT-CTA NECK WITH & W/O-POST PROCESSING   Final Result      Beaded appearance of the cervical carotid and vertebral arteries most suggestive of fibromuscular dysplasia.      No significant stenosis or dissection.      Lytic lesion in the C7 vertebral body and the left frontal calvarium. Malignancy is concerning and must be excluded.      CT-CTA HEAD WITH & W/O-POST PROCESS   Final Result      Acute/subacute left MCA infarct.      No large vessel occlusion or hemodynamically significant stenosis.      Small lytic lesion in the left frontal calvaria concerning for malignancy.      LD-FCRUKIT-5 VIEW   Final Result         1.  Nonspecific bowel gas pattern.      US-EXTREMITY VENOUS LOWER BILAT   Final Result      MR-PELVIS-WITH & W/O AND SEQUENCES   Final Result         1. Stage IV endometrial cancer. The endometrial mass itself measures 13 cm, invading through the anterior uterine wall and uterine fundus.   2. Metastatic parametrial, external iliac and para-aortic lymphadenopathy.   3. Multiple pelvic osseous metastases.   4. Several  uterine fibroids.            US-PELVIC TRANSABDOMINAL ONLY   Final Result      Enlarged, heterogeneous uterus could indicate underlying fibroids though malignancy cannot be excluded.      Endometrial stripe not visualized.      CT-CTA COMPLETE THORACOABDOMINAL AORTA   Final Result      No evidence of aortic aneurysm or dissection.      Partial visualization of a markedly enlarged heterogeneous uterus with hypervascularity concerning for malignancy versus fibroids.      Diverticulosis without evidence of diverticulitis.      Bilateral hydronephrosis and hydroureter likely related to enlarged uterus.      Nonobstructive left renal stone.      Bilateral pulmonary nodules measuring up to 5 mm.      Nodule recommendations:   Low Risk: No routine follow-up      High Risk: Optional CT at 12 months      Comments: Use most suspicious nodule as guide to management. Follow-up intervals may vary according to size and risk.      Low Risk - Minimal or absent history of smoking and of other known risk factors.      High Risk - History of smoking or of other known risk factors.      Note: These recommendations do not apply to lung cancer screening, patients with immunosuppression, or patients with known primary cancer.      Fleischner Society 2017 Guidelines for Management of Incidentally Detected Pulmonary Nodules in Adults      DX-CHEST-PORTABLE (1 VIEW)   Final Result      No acute cardiac or pulmonary abnormalities are identified.          CULTURES:   Results     Procedure Component Value Units Date/Time    URINE CULTURE(NEW) [[id number]] Collected: 03/27/21 1329    Order Status: Completed Specimen: Urine, Clean Catch Updated: 03/29/21 0832     Significant Indicator NEG     Source UR     Site URINE, CLEAN CATCH     Culture Result Usual urogenital marisa 10-50,000 cfu/mL    Narrative:      Indication for culture:->Patient WITHOUT an indwelling Beverly  catheter in place with new onset of Dysuria, Frequency,  Urgency, and/or  Suprapubic pain  Indication for culture:->Patient WITHOUT an indwelling Beverly    URINALYSIS [[id number]]  (Abnormal) Collected: 03/27/21 1329    Order Status: Completed Specimen: Urine, Clean Catch Updated: 03/27/21 1359     Color Yellow     Character Cloudy     Specific Gravity 1.009     Ph 5.5     Glucose Negative mg/dL      Ketones Negative mg/dL      Protein 100 mg/dL      Bilirubin Negative     Urobilinogen, Urine 0.2     Nitrite Negative     Leukocyte Esterase Moderate     Occult Blood Large     Micro Urine Req Microscopic    Narrative:      Indication for culture:->Patient WITHOUT an indwelling Beverly  catheter in place with new onset of Dysuria, Frequency,  Urgency, and/or Suprapubic pain          MEDS:  Current Facility-Administered Medications   Medication Last Admin   • nystatin (MYCOSTATIN) [id number] UNIT/ML suspension 500,000 Units 500,000 Units at 04/02/21 2041   • enoxaparin (LOVENOX) inj 30 mg 30 mg at 04/03/21 0506   • aspirin EC (ECOTRIN) tablet 81 mg 81 mg at 04/03/21 0506   • oxyCODONE-acetaminophen (PERCOCET) 5-325 MG per tablet 1 tablet 1 tablet at 04/01/21 1705   • amLODIPine (NORVASC) tablet 5 mg 5 mg at 04/02/21 1801   • haloperidol lactate (HALDOL) injection 1 mg 1 mg at 03/31/21 2206   • Pharmacy Consult Request ...Pain Management Review 1 Each     • ondansetron (ZOFRAN) syringe/vial injection 4 mg 4 mg at 04/01/21 1705   • NS (BOLUS) infusion 500 mL     • prochlorperazine (COMPAZINE) injection 10 mg     • senna-docusate (PERICOLACE or SENOKOT S) 8.6-50 MG per tablet 2 tablet 2 tablet at 04/03/21 0506    And   • polyethylene glycol/lytes (MIRALAX) PACKET 1 Packet      And   • magnesium hydroxide (MILK OF MAGNESIA) suspension 30 mL      And   • bisacodyl (DULCOLAX) suppository 10 mg     • ondansetron (ZOFRAN ODT) dispertab 4 mg         ASSESSMENT/PLAN:   71 y.o. female admitted for uterine mass, suspected malignancy, now status post total abdominal hysterectomy with salpingo-oophorectomy,  periaortic and pelvic lymph node dissection, omentectomy, bilateral ureterolysis with peritoneal biopsies, cystoscopy repair and bilateral ureteral stent placement on 3/29/2021    #Metastatic uterine cancer, adenocarcinoma  S/p surgery including hysterectomy, oophorectomy, omentecomy 3/29/2021; POD # 5.  Pathology report has now confirmed evidence of metastasis to omentum, lymph nodes, and pelvic wall; there are possible bone mets, as well as brain mets with imaging revealinglesions of the C7 vertebral body as well as left frontal calvaria.  -Further recommendations per Gyn/Onc  -Palliative consult on 4/2/21 recommended cognitive evaluation with SLP, now ordered; recommendations appreciated  -Awaiting patient visit with oncologist     #Acute left MCA infarct  Patient with development of expressive aphasia and confusion on 3/30/2021 with subsequent findings of acute infarction.  Per neurology note, pattern of infarct suspicious for cardioembolic phenomenon possibly related to hypercoagulability due to underlying cancer.  -Repeat CT head without contrast ruled out hemorrhagic conversion  -PT/OT/speech ordered  -FEES pending per speech  -Echocardiogram ordered for possible cardioembolic origin of MRI brain findings; echo has now returned with EF 60%; mildly dilated LA; RVSP 29 mm Hg   -Overall prognosis is poor and would like to involve palliative care  -Patient will likely require postacute rehab for speech therapy  -Diet advanced 4/2/21 per SLP; recommendations appreciated  -Aspirin started 4/2/21     #Acute transaminitis  Patient with acute rise in transaminases, suspected to be due to shock liver  -Ammonia level normal  -Holdin statin, Tylenol; hold all other hepatotoxins  -Continue to monitor; the downward trend is continuing today, with AST and  and 244, respectively     #Iron deficiency anemia due to chronic blood loss  -Hemoglobin this AM Is 7.3  -Aspirin and lovenox were started yesterday  -Getting  repeat H/H now  -Continue to monitor, transfuse as indicated for hemoglobin less than 7     #Hypokalemia  Continue to monitor, replace as indicated     #Hyperlipidemia  -Holding statin as above     #Essential hypertension  -Holding home amlodipine     #DVT Prophylaxis  -Per gynecology, OK to start lovenox DVT ppx once hemoglobin has stabilized  -Lovenox started 4/2/21     Core Measures:  DVT PPX: Lovenox, SCDs  ABX: None  Lines: PIV  Fluids: None  PCP: Shan Tuttle MD  CODE STATUS: Full     Dispo: Inpatient    Josue White MD   PGY-1 Family Medicine Resident   [facility]

## 2021-04-03 NOTE — PROGRESS NOTES
Bedside report received. POC discussed with pt; all questions answered at this time. Call light within reach, fall precautions in place. Patient alert and oriented x1-2, able to communicate needs. Family member at bedside during shift change. All questions and concerns addressed. Pt. Denied pain or discomfort. No s/s of apparent distress noted.

## 2021-04-03 NOTE — PROGRESS NOTES
Bedside report received. VS'S. IDANIA. NSR on telemetry monitor overnight. Pt has expressive aphasia however is able to follow commands and answer yes or no questions by nodding. Hgb dropped 8.1 to 7.3 overnight after starting Lovenox and asa yesterday afternoon. Per report pt had a large BM overnight, dunn in place with CHAVA. Call light and belongings with in reach. Bed locked and in lowest position, alarm and fall precautions in place.

## 2021-04-03 NOTE — CARE PLAN
Problem: Communication  Goal: The ability to communicate needs accurately and effectively will improve  Outcome: PROGRESSING AS EXPECTED     Problem: Pain Management  Goal: Pain level will decrease to patient's comfort goal  Outcome: PROGRESSING AS EXPECTED     Problem: Infection  Goal: Will remain free from infection  Outcome: PROGRESSING AS EXPECTED     Problem: Venous Thromboembolism (VTW)/Deep Vein Thrombosis (DVT) Prevention:  Goal: Patient will participate in Venous Thrombosis (VTE)/Deep Vein Thrombosis (DVT)Prevention Measures  Outcome: PROGRESSING AS EXPECTED     Problem: Psychosocial Needs:  Goal: Level of anxiety will decrease  Outcome: PROGRESSING AS EXPECTED     Problem: Fluid Volume:  Goal: Will maintain balanced intake and output  Outcome: PROGRESSING AS EXPECTED     Problem: Respiratory:  Goal: Respiratory status will improve  Outcome: PROGRESSING AS EXPECTED

## 2021-04-04 LAB
ALBUMIN SERPL BCP-MCNC: 2.3 G/DL (ref 3.2–4.9)
ALBUMIN/GLOB SERPL: 0.6 G/DL
ALP SERPL-CCNC: 237 U/L (ref 30–99)
ALT SERPL-CCNC: 173 U/L (ref 2–50)
ANION GAP SERPL CALC-SCNC: 8 MMOL/L (ref 7–16)
AST SERPL-CCNC: 69 U/L (ref 12–45)
BASOPHILS # BLD AUTO: 0.2 % (ref 0–1.8)
BASOPHILS # BLD: 0.04 K/UL (ref 0–0.12)
BILIRUB SERPL-MCNC: 0.2 MG/DL (ref 0.1–1.5)
BUN SERPL-MCNC: 9 MG/DL (ref 8–22)
CALCIUM SERPL-MCNC: 8.3 MG/DL (ref 8.5–10.5)
CHLORIDE SERPL-SCNC: 103 MMOL/L (ref 96–112)
CO2 SERPL-SCNC: 26 MMOL/L (ref 20–33)
CREAT SERPL-MCNC: 0.62 MG/DL (ref 0.5–1.4)
EOSINOPHIL # BLD AUTO: 0.41 K/UL (ref 0–0.51)
EOSINOPHIL NFR BLD: 2.4 % (ref 0–6.9)
GLOBULIN SER CALC-MCNC: 3.8 G/DL (ref 1.9–3.5)
GLUCOSE SERPL-MCNC: 121 MG/DL (ref 65–99)
HCT VFR BLD AUTO: 24.6 % (ref 37–47)
HGB BLD-MCNC: 7.7 G/DL (ref 12–16)
IMM GRANULOCYTES # BLD AUTO: 0.34 K/UL (ref 0–0.11)
IMM GRANULOCYTES NFR BLD AUTO: 2 % (ref 0–0.9)
IRON SATN MFR SERPL: 12 % (ref 15–55)
IRON SERPL-MCNC: 21 UG/DL (ref 40–170)
LYMPHOCYTES # BLD AUTO: 1.27 K/UL (ref 1–4.8)
LYMPHOCYTES NFR BLD: 7.6 % (ref 22–41)
MCH RBC QN AUTO: 24.7 PG (ref 27–33)
MCHC RBC AUTO-ENTMCNC: 31.3 G/DL (ref 33.6–35)
MCV RBC AUTO: 78.8 FL (ref 81.4–97.8)
MONOCYTES # BLD AUTO: 1.42 K/UL (ref 0–0.85)
MONOCYTES NFR BLD AUTO: 8.5 % (ref 0–13.4)
NEUTROPHILS # BLD AUTO: 13.27 K/UL (ref 2–7.15)
NEUTROPHILS NFR BLD: 79.3 % (ref 44–72)
NRBC # BLD AUTO: 0.03 K/UL
NRBC BLD-RTO: 0.2 /100 WBC
PLATELET # BLD AUTO: 339 K/UL (ref 164–446)
PMV BLD AUTO: 9.3 FL (ref 9–12.9)
POTASSIUM SERPL-SCNC: 4.1 MMOL/L (ref 3.6–5.5)
PROT SERPL-MCNC: 6.1 G/DL (ref 6–8.2)
RBC # BLD AUTO: 3.12 M/UL (ref 4.2–5.4)
SODIUM SERPL-SCNC: 137 MMOL/L (ref 135–145)
TIBC SERPL-MCNC: 174 UG/DL (ref 250–450)
UIBC SERPL-MCNC: 153 UG/DL (ref 110–370)
WBC # BLD AUTO: 16.8 K/UL (ref 4.8–10.8)

## 2021-04-04 PROCEDURE — 85025 COMPLETE CBC W/AUTO DIFF WBC: CPT

## 2021-04-04 PROCEDURE — 80053 COMPREHEN METABOLIC PANEL: CPT

## 2021-04-04 PROCEDURE — 83550 IRON BINDING TEST: CPT

## 2021-04-04 PROCEDURE — 700102 HCHG RX REV CODE 250 W/ 637 OVERRIDE(OP): Performed by: STUDENT IN AN ORGANIZED HEALTH CARE EDUCATION/TRAINING PROGRAM

## 2021-04-04 PROCEDURE — 700111 HCHG RX REV CODE 636 W/ 250 OVERRIDE (IP): Performed by: STUDENT IN AN ORGANIZED HEALTH CARE EDUCATION/TRAINING PROGRAM

## 2021-04-04 PROCEDURE — 770020 HCHG ROOM/CARE - TELE (206)

## 2021-04-04 PROCEDURE — A9270 NON-COVERED ITEM OR SERVICE: HCPCS | Performed by: STUDENT IN AN ORGANIZED HEALTH CARE EDUCATION/TRAINING PROGRAM

## 2021-04-04 PROCEDURE — 83540 ASSAY OF IRON: CPT

## 2021-04-04 PROCEDURE — 36415 COLL VENOUS BLD VENIPUNCTURE: CPT

## 2021-04-04 PROCEDURE — 700105 HCHG RX REV CODE 258: Performed by: STUDENT IN AN ORGANIZED HEALTH CARE EDUCATION/TRAINING PROGRAM

## 2021-04-04 RX ADMIN — ENOXAPARIN SODIUM 30 MG: 30 INJECTION SUBCUTANEOUS at 05:24

## 2021-04-04 RX ADMIN — AMLODIPINE BESYLATE 5 MG: 5 TABLET ORAL at 17:12

## 2021-04-04 RX ADMIN — SODIUM CHLORIDE 125 MG: 9 INJECTION, SOLUTION INTRAVENOUS at 17:14

## 2021-04-04 RX ADMIN — ASPIRIN 81 MG: 81 TABLET, COATED ORAL at 05:25

## 2021-04-04 ASSESSMENT — FIBROSIS 4 INDEX: FIB4 SCORE: 1.07

## 2021-04-04 ASSESSMENT — PAIN DESCRIPTION - PAIN TYPE: TYPE: ACUTE PAIN

## 2021-04-04 NOTE — CARE PLAN
Problem: Communication  Goal: The ability to communicate needs accurately and effectively will improve  Outcome: PROGRESSING AS EXPECTED     Problem: Pain Management  Goal: Pain level will decrease to patient's comfort goal  Outcome: PROGRESSING AS EXPECTED     Problem: Infection  Goal: Will remain free from infection  Outcome: PROGRESSING AS EXPECTED     Problem: Venous Thromboembolism (VTW)/Deep Vein Thrombosis (DVT) Prevention:  Goal: Patient will participate in Venous Thrombosis (VTE)/Deep Vein Thrombosis (DVT)Prevention Measures  Outcome: PROGRESSING AS EXPECTED     Problem: Psychosocial Needs:  Goal: Level of anxiety will decrease  Outcome: PROGRESSING AS EXPECTED     Problem: Fluid Volume:  Goal: Will maintain balanced intake and output  Outcome: PROGRESSING AS EXPECTED

## 2021-04-04 NOTE — PROGRESS NOTES
Surgical Progress Note    Author: Shonda Quinteros M.D.      Interval Events:  Sitting up in bed, eating w/ assistance of KAREEM Graves. Notes abd pain and is waiting for pain medication to take effect. Remains aphasic and inability to answer more than yes or no questions is limited. Comprehension also appears minimal.     Hemodynamics:  Temp (24hrs), Av.7 °C (98 °F), Min:36.1 °C (97 °F), Max:37.3 °C (99.2 °F)  Temperature: 36.9 °C (98.4 °F)  Pulse  Av.8  Min: 73  Max: 120   Blood Pressure : 125/72     Respiratory:    Respiration: 16, Pulse Oximetry: 95 %        RUL Breath Sounds: Diminished, RML Breath Sounds: Diminished, RLL Breath Sounds: Diminished, KAREN Breath Sounds: Diminished, LLL Breath Sounds: Diminished  Neuro:  GCS Total [place] Coma Score: 13     Fluids:    Intake/Output Summary (Last 24 hours) at 3/31/2021 1521  Last data filed at 3/31/2021 0700  Gross per 24 hour   Intake 2180.88 ml   Output 2780 ml   Net -599.12 ml     Weight: 64 kg (141 lb 1.5 oz)  Current Diet Order   Procedures   • Diet Order Diet: Level 3 - Liquidised; Liquid level: Level 2 - Mildly Thick; Tray Modifications (optional): SLP - OK per SLP, SLP - 1:1 Supervision by Nursing     Physical Exam   HENT:   Head: Normocephalic.   Cardiovascular: Normal rate.   Pulmonary/Chest: Effort normal.   Abdominal: Soft. She exhibits no distension. There is no abdominal tenderness.   VML incision c/d/i w/ SQ suture, LLQ drain w/ minimal SS output   Genitourinary:    Genitourinary Comments: Beverly draining blood tinged urine     Musculoskeletal:         General: Edema present.   Neurological: She is alert.     Labs:  Recent Results (from the past 24 hour(s))   CBC WITH DIFFERENTIAL    Collection Time: 21  2:38 AM   Result Value Ref Range    WBC 18.1 (H) 4.8 - 10.8 K/uL    RBC 2.85 (L) 4.20 - 5.40 M/uL    Hemoglobin 7.3 (L) 12.0 - 16.0 g/dL    Hematocrit 21.8 (L) 37.0 - 47.0 %    MCV 76.5 (L) 81.4 - 97.8 fL    MCH 25.6 (L) 27.0 - 33.0 pg     MCHC 33.5 (L) 33.6 - 35.0 g/dL    Platelet Count 298 164 - 446 K/uL    MPV 8.9 (L) 9.0 - 12.9 fL    Neutrophils-Polys 89.50 (H) 44.00 - 72.00 %    Lymphocytes 6.10 (L) 22.00 - 41.00 %    Monocytes 3.50 0.00 - 13.40 %    Eosinophils 0.00 0.00 - 6.90 %    Basophils 0.00 0.00 - 1.80 %    Nucleated RBC 0.10 /100 WBC    Neutrophils (Absolute) 16.20 (H) 2.00 - 7.15 K/uL    Lymphs (Absolute) 1.10 1.00 - 4.80 K/uL    Monos (Absolute) 0.63 0.00 - 0.85 K/uL    Eos (Absolute) 0.00 0.00 - 0.51 K/uL    Baso (Absolute) 0.00 0.00 - 0.12 K/uL    NRBC (Absolute) 0.02 K/uL    Hypochromia 2+ (A)     Anisocytosis 3+ (A)     Macrocytosis 1+     Microcytosis 2+ (A)    Comp Metabolic Panel    Collection Time: 04/03/21  2:38 AM   Result Value Ref Range    Sodium 134 (L) 135 - 145 mmol/L    Potassium 3.9 3.6 - 5.5 mmol/L    Chloride 102 96 - 112 mmol/L    Co2 24 20 - 33 mmol/L    Anion Gap 8.0 7.0 - 16.0    Glucose 126 (H) 65 - 99 mg/dL    Bun 9 8 - 22 mg/dL    Creatinine 0.61 0.50 - 1.40 mg/dL    Calcium 7.9 (L) 8.5 - 10.5 mg/dL    AST(SGOT) 123 (H) 12 - 45 U/L    ALT(SGPT) 244 (H) 2 - 50 U/L    Alkaline Phosphatase 243 (H) 30 - 99 U/L    Total Bilirubin 0.2 0.1 - 1.5 mg/dL    Albumin 1.8 (L) 3.2 - 4.9 g/dL    Total Protein 5.6 (L) 6.0 - 8.2 g/dL    Globulin 3.8 (H) 1.9 - 3.5 g/dL    A-G Ratio 0.5 g/dL   ESTIMATED GFR    Collection Time: 04/03/21  2:38 AM   Result Value Ref Range    GFR If African American >60 >60 mL/min/1.73 m 2    GFR If Non African American >60 >60 mL/min/1.73 m 2   PLATELET ESTIMATE    Collection Time: 04/03/21  2:38 AM   Result Value Ref Range    Plt Estimation Normal    MORPHOLOGY    Collection Time: 04/03/21  2:38 AM   Result Value Ref Range    RBC Morphology Present     Polychromia 3+     Poikilocytosis 1+     Target Cells 1+    PERIPHERAL SMEAR REVIEW    Collection Time: 04/03/21  2:38 AM   Result Value Ref Range    Peripheral Smear Review see below    DIFFERENTIAL MANUAL    Collection Time: 04/03/21  2:38 AM    Result Value Ref Range    Metamyelocytes 0.90 %    Manual Diff Status PERFORMED    HEMOGLOBIN AND HEMATOCRIT    Collection Time: 04/03/21 10:35 AM   Result Value Ref Range    Hemoglobin 7.8 (L) 12.0 - 16.0 g/dL    Hematocrit 24.3 (L) 37.0 - 47.0 %     Medical Decision Making, by Problem:  Active Hospital Problems    Diagnosis    • Metastatic endometrial cancer (HCC) [C54.1]      Priority: High   • Constipation [K59.00]      Priority: Medium   • Iron deficiency anemia due to chronic blood loss [D50.0]      Priority: Medium   • Hypokalemia [E87.6]      Priority: Low   • Hyponatremia [E87.1]      Priority: Low   • Peripheral edema [R60.9]      Priority: Low   • Hyperlipidemia [E78.5]      Priority: Low   • Essential hypertension [I10]      Priority: Low   • Prediabetes [R73.03]      Priority: Low   • Confusion [R41.0]      Plan:  72 y/o found to have a endometrial mass now s/p MACKENZIE/BSO/PALND/PLND/omentectomy/bilateral uretrolysis/peritoneal biopsies/cystotomy repair/bilateral ureteral stent placement on 3/29/21:      1. POD #5: making slow progress d/t postoperative stroke, thickened liquid diet w/ advancement pending SLP recs, KATIUSKA drain serosang, encourage ambulation, Needs stents x6 weeks, dunn x3 weeks.   2. Expressive aphasia: L MCA infarct, cardiothrombotic event, appreciate neurology assistance, repeat CTH w/ no change 3/31, on ASA   3. Post op pain: percocet prn  4. Anemia: acute on chronic, s/p 2 units intraop, hg 11 >> 7.8 today, CTM and transfuse as indicated  5. Leukocytosis: afebrile, likely reactive  6. Transaminitis: improving, NH4 wnl, suspect d/t shock, no tylenol  7. Stage IVB uterine carcinosarcoma: surgical findings and details d/w patient and boyfriend in detail, final pathology reveals metastatic carcinosarcoma. I had a long discussion with the patient and her partner today regarding diagnosis, prognosis, and potential treatment options. I relayed that this is a very aggressive gynecologic  malignancy and that her disease is advanced. I discussed with them that the prognosis is very poor given her widespread metastases with bone involvement. We reviewed chemotherapy as a potential treatment option and discussed that we typically give carboplatin and paclitaxel w/ adjuvant therapy. We discussed typical treatment course, and anticipated side effects including BMS, n/v, fatigue, renal and hepatic dysfunction, neuropathy, alopecia, allergic reactions, joint pains and myalgias. I also relayed that her ability to tolerate treatment appears limited at this time and that her functional status would need to improve prior to consideration for treatment. Anticipate that she may require extended rehab postoperatively to achieve this. I also discussed with the patient and significant other that unfortunately chemotherapy would be palliative rather than curative in this setting. Given her prognosis and underlying medical conditions, now including CVA, we also discussed palliative care and/or Hospice as an alternative. Patient does not appear to have comprehension of our discussion, however her partner Star voices understanding. Will continue to evaluate and discuss goals of care.   8. Dysphagia: s/p SLP consult, s/p FEES study   9. GI/FEN: thickened liquid diet, monitor lytes and replete PRN  10. Ppx: SCDs, lovenox  11. Dispo: continue inpatient management for postop care

## 2021-04-04 NOTE — CARE PLAN
Problem: Communication  Goal: The ability to communicate needs accurately and effectively will improve  Outcome: PROGRESSING AS EXPECTED     Problem: Pain Management  Goal: Pain level will decrease to patient's comfort goal  Outcome: PROGRESSING AS EXPECTED     Problem: Infection  Goal: Will remain free from infection  Outcome: PROGRESSING AS EXPECTED     Problem: Venous Thromboembolism (VTW)/Deep Vein Thrombosis (DVT) Prevention:  Goal: Patient will participate in Venous Thrombosis (VTE)/Deep Vein Thrombosis (DVT)Prevention Measures  Outcome: PROGRESSING AS EXPECTED     Problem: Psychosocial Needs:  Goal: Level of anxiety will decrease  Outcome: PROGRESSING AS EXPECTED

## 2021-04-04 NOTE — PROGRESS NOTES
"Surgical Progress Note    Author: Shonda Quinteros M.D.      Interval Events:  Tearful this AM and asking to get out of bed. Verbalization has improved significantly; able to express needs and using vocabulary beyond \"yes\" or \"no.\"  Still confused, and worried that her bed is broken but unable to elaborate why. Notes good pain control. Has not been up OOB. Tolerating PO. Voiding to dunn.     Hemodynamics:  Temp (24hrs), Av.7 °C (98 °F), Min:36 °C (96.8 °F), Max:37.3 °C (99.2 °F)  Temperature: 36.6 °C (97.8 °F)  Pulse  Av  Min: 73  Max: 120   Blood Pressure : 127/84     Respiratory:    Respiration: 16, Pulse Oximetry: 97 %        RUL Breath Sounds: Diminished, RML Breath Sounds: Diminished, RLL Breath Sounds: Diminished, KAREN Breath Sounds: Diminished, LLL Breath Sounds: Diminished  Neuro:  GCS Total Welsh Coma Score: 13     Fluids:    Intake/Output Summary (Last 24 hours) at 3/31/2021 1521  Last data filed at 3/31/2021 0700  Gross per 24 hour   Intake 2180.88 ml   Output 2780 ml   Net -599.12 ml     Weight: 63.9 kg (140 lb 14 oz)  Current Diet Order   Procedures   • Diet Order Diet: Level 3 - Liquidised; Liquid level: Level 2 - Mildly Thick; Tray Modifications (optional): SLP - OK per SLP, SLP - 1:1 Supervision by Nursing     Physical Exam   HENT:   Head: Normocephalic.   Cardiovascular: Normal rate.   Pulmonary/Chest: Effort normal.   Abdominal: Soft. She exhibits no distension. There is no abdominal tenderness.   VML incision c/d/i w/ SQ suture, LLQ drain w/ minimal SS output   Genitourinary:    Genitourinary Comments: Dunn draining faintly blood tinged urine     Musculoskeletal:         General: Edema present.   Neurological: She is alert.     Labs:  Recent Results (from the past 24 hour(s))   CBC WITH DIFFERENTIAL    Collection Time: 21  2:57 AM   Result Value Ref Range    WBC 16.8 (H) 4.8 - 10.8 K/uL    RBC 3.12 (L) 4.20 - 5.40 M/uL    Hemoglobin 7.7 (L) 12.0 - 16.0 g/dL    Hematocrit 24.6 " (L) 37.0 - 47.0 %    MCV 78.8 (L) 81.4 - 97.8 fL    MCH 24.7 (L) 27.0 - 33.0 pg    MCHC 31.3 (L) 33.6 - 35.0 g/dL    Platelet Count 339 164 - 446 K/uL    MPV 9.3 9.0 - 12.9 fL    Neutrophils-Polys 79.30 (H) 44.00 - 72.00 %    Lymphocytes 7.60 (L) 22.00 - 41.00 %    Monocytes 8.50 0.00 - 13.40 %    Eosinophils 2.40 0.00 - 6.90 %    Basophils 0.20 0.00 - 1.80 %    Immature Granulocytes 2.00 (H) 0.00 - 0.90 %    Nucleated RBC 0.20 /100 WBC    Neutrophils (Absolute) 13.27 (H) 2.00 - 7.15 K/uL    Lymphs (Absolute) 1.27 1.00 - 4.80 K/uL    Monos (Absolute) 1.42 (H) 0.00 - 0.85 K/uL    Eos (Absolute) 0.41 0.00 - 0.51 K/uL    Baso (Absolute) 0.04 0.00 - 0.12 K/uL    Immature Granulocytes (abs) 0.34 (H) 0.00 - 0.11 K/uL    NRBC (Absolute) 0.03 K/uL   Comp Metabolic Panel    Collection Time: 04/04/21  2:57 AM   Result Value Ref Range    Sodium 137 135 - 145 mmol/L    Potassium 4.1 3.6 - 5.5 mmol/L    Chloride 103 96 - 112 mmol/L    Co2 26 20 - 33 mmol/L    Anion Gap 8.0 7.0 - 16.0    Glucose 121 (H) 65 - 99 mg/dL    Bun 9 8 - 22 mg/dL    Creatinine 0.62 0.50 - 1.40 mg/dL    Calcium 8.3 (L) 8.5 - 10.5 mg/dL    AST(SGOT) 69 (H) 12 - 45 U/L    ALT(SGPT) 173 (H) 2 - 50 U/L    Alkaline Phosphatase 237 (H) 30 - 99 U/L    Total Bilirubin 0.2 0.1 - 1.5 mg/dL    Albumin 2.3 (L) 3.2 - 4.9 g/dL    Total Protein 6.1 6.0 - 8.2 g/dL    Globulin 3.8 (H) 1.9 - 3.5 g/dL    A-G Ratio 0.6 g/dL   ESTIMATED GFR    Collection Time: 04/04/21  2:57 AM   Result Value Ref Range    GFR If African American >60 >60 mL/min/1.73 m 2    GFR If Non African American >60 >60 mL/min/1.73 m 2   IRON/TOTAL IRON BIND    Collection Time: 04/04/21  2:57 AM   Result Value Ref Range    Iron 21 (L) 40 - 170 ug/dL    Total Iron Binding 174 (L) 250 - 450 ug/dL    Unsat Iron Binding 153 110 - 370 ug/dL    % Saturation 12 (L) 15 - 55 %     Medical Decision Making, by Problem:  Active Hospital Problems    Diagnosis    • Metastatic endometrial cancer (HCC) [C54.1]       Priority: High   • Constipation [K59.00]      Priority: Medium   • Iron deficiency anemia due to chronic blood loss [D50.0]      Priority: Medium   • Hypokalemia [E87.6]      Priority: Low   • Hyponatremia [E87.1]      Priority: Low   • Peripheral edema [R60.9]      Priority: Low   • Hyperlipidemia [E78.5]      Priority: Low   • Essential hypertension [I10]      Priority: Low   • Prediabetes [R73.03]      Priority: Low   • Confusion [R41.0]      Plan:  70 y/o found to have a endometrial mass now s/p MACKENZIE/BSO/PALND/PLND/omentectomy/bilateral uretrolysis/peritoneal biopsies/cystotomy repair/bilateral ureteral stent placement on 3/29/21:      1. POD #6: making slow progress d/t postoperative stroke, thickened liquid diet w/ advancement pending SLP recs, KATIUSKA drain serosang, encourage ambulation, Needs stents x6 weeks, dunn x3 weeks.   2. Expressive aphasia: L MCA infarct, cardiothrombotic event, appreciate neurology assistance, repeat CTH w/ no change 3/31, on ASA, verbalization improved significantly today  3. Post op pain: percocet prn  4. Anemia: acute on chronic, s/p 2 units intraop, hg 11 >> 7.7 today, CTM and transfuse as indicated  5. Leukocytosis: downtrending, afebrile, likely reactive  6. Transaminitis: improving, NH4 wnl, suspect d/t shock, no tylenol  7. Stage IVB uterine carcinosarcoma: surgical findings and details d/w patient and boyfriend, final pathology reveals metastatic carcinosarcoma. I had a long discussion with the patient and her partner 4/3/21 regarding diagnosis, prognosis, and potential treatment options. I relayed that this is a very aggressive gynecologic malignancy and that her disease is advanced. I discussed with them that the prognosis is very poor given her widespread metastases with bone involvement. We reviewed chemotherapy as a potential treatment option and discussed that we typically give carboplatin and paclitaxel in the adjuvant setting. We discussed typical treatment course, and  anticipated side effects including BMS, n/v, fatigue, renal and hepatic dysfunction, neuropathy, alopecia, allergic reactions, joint pains and myalgias. I also relayed that her ability to tolerate treatment appears limited at this time and that her functional status would need to improve prior to consideration for treatment. Anticipate that she may require extended rehab postoperatively to achieve this. I also discussed with the patient and significant other that unfortunately chemotherapy would be palliative rather than curative in this setting. Given her prognosis and underlying medical conditions, now including CVA, we also discussed palliative care and/or Hospice as an alternative. Patient did not appear to have comprehension of our discussion, however her partner Star voiced understanding. Will continue to evaluate and discuss goals of care.   8. Dysphagia: s/p SLP consult and FEES study   9. Debilitation: PT ordered to assist in postop mobility, OOB to chair today  10. GI/FEN: thickened liquid diet, monitor lytes and replete PRN  11. Ppx: SCDs, ASA, lovenox  12. Dispo: continue inpatient management for postop care

## 2021-04-04 NOTE — PALLIATIVE CARE
"Palliative Care follow-up  PC RN met with Graciela and Star at . Graciela had paper at  where she had been writing her name. She greeted this RN. Star expressed understanding of the discussion with Dr. Quinteros the day prior and recognizes her cancer is \"difficult to treat.\" PC spoke to the agressiveness of it, but also Graciela's functional status being impacted now because of the CVA. PC asked Graciela where she is and she stated \"hospital.\" She could not tell this RN why she was here but when provided with a response of \"surgery\" she said \"yes.\" PC gave her pen and paper to see if this would be more helpful for her. PC inquired about what year it is and she wrote \"2221.\" She was unable to come up with the president's name. Graciela recalls Dr. Quinteros visiting, buit no recollection of the discussion. PC provided some of the information to Graciela again.    PC discussed with Star the complexity of decision making given she appears to understand some things and not others. PC explained the [place] statute and given they are not legally , the team often has to reach out to children to be involved. Graciela stated clearly \"I don't want them. Just him\" pointing at Star. PC confirmed that she would want Star to help with her medical situation and she said \"Yes. They don't know me at all.\" PC expressed understanding of her wishes and need for clarification on her mental state. PC explained the plan for cognitive evaluation, and possible capacity evaluation pertaining to her ability to appoint a POA if warranted. PC inquired about discussions surrounding Graciela's wishes. He tells PC that \"if she's dying, she wants to be at home.\" He also tells PC that at this time, she would want to remain full code. PC expressed understanding and discussed next steps. PC explained the role of rehab/SNF to help with her functional/speech/cognitive improvement, and this being an option to improve her functional status, which he understands. PC " also explained that if the goal is hospice, Star is unable to At this point, no questions/needs. PC offered to f/u after the cog eval is complete, and if able, complete medical POA paperwork. He was appreciative.       Updated: MD    Plan: following for SLP asher    Thank you for allowing Palliative Care to support this patient and family. Contact [phone] for additional assistance, change in patient status, or with any questions/concerns.

## 2021-04-04 NOTE — PROGRESS NOTES
"  [facility] [place] [facility] [place]     Attending:   Dr. Salcedo    Resident:   Josue White MD    PATIENT:   Graciela Desai; [id number]; 1950    SUBJECTIVE:   No acute events overnight. The patient is tearful this morning. Her speech is slightly improved from yesterday, and she is able to voice that she is uncomfortable in bed. She says \"no\" when asked if she is experiencing any pain. She says \"no\" when asked if she has any concerns at this time.    OBJECTIVE:  Vitals:    04/03/21 1931 04/03/21 2332 04/04/21 0412 04/04/21 0946   BP: 119/73 119/58 127/65 127/84   Pulse: 98 99 96 (!) 105   Resp: 16 16 16 16   Temp: 36.9 °C (98.4 °F) 36.2 °C (97.2 °F) 36 °C (96.8 °F) 36.6 °C (97.8 °F)   TempSrc: Temporal Temporal Temporal Temporal   SpO2: 96% 96% 96% 97%   Weight: 63.9 kg (140 lb 14 oz)      Height:           Intake/Output Summary (Last 24 hours) at 4/4/2021 1121  Last data filed at 4/4/2021 0500  Gross per 24 hour   Intake --   Output 1005 ml   Net -1005 ml       PHYSICAL EXAM:   General: No acute distress, afebrile, resting comfortably, conversational   HEENT: NC/AT. EOMI.   Cardiovascular: RRR without murmurs, rubs, heaves. Normal capillary refill   Respiratory: CTAB, no tachypnea or retractions   Abdomen: midline surgical incision is CDI; normal bowel sounds, soft, nontender, nondistended, no masses, no organomegaly   EXT:  SCOTT, no edema  Skin: No erythema/lesions; PIV in left and right upper extremity; each PIV is free of discharge, no erythema, no edema, to TTP  Neuro: Non-focal, alert and orientated       LABS:  Recent Labs     04/02/21  0137 04/02/21  1405 04/03/21  0238 04/03/21  1035 04/04/21  0257   WBC 19.6*  --  18.1*  --  16.8*   RBC 3.10*  --  2.85*  --  3.12*   HEMOGLOBIN 8.0*   < > 7.3* 7.8* 7.7*   HEMATOCRIT 24.7*   < > 21.8* 24.3* 24.6*   MCV 79.7*  --  76.5*  --  78.8*   MCH 25.8*  --  25.6*  --  24.7*   PLATELETCT 243  --  298  --  339   MPV 9.4  --  8.9*  --  9.3   NEUTSPOLYS 90.60*  --  " 89.50*  --  79.30*   LYMPHOCYTES 2.60*  --  6.10*  --  7.60*   MONOCYTES 4.30  --  3.50  --  8.50   EOSINOPHILS 2.50  --  0.00  --  2.40   BASOPHILS 0.00  --  0.00  --  0.20   RBCMORPHOLO Present  --  Present  --   --     < > = values in this interval not displayed.     Recent Labs     04/02/21 0137 04/03/21 0238 04/04/21  0257   SODIUM 141 134* 137   POTASSIUM 4.1 3.9 4.1   CHLORIDE 108 102 103   CO2 27 24 26   BUN 9 9 9   CREATININE 0.68 0.61 0.62   CALCIUM 8.2* 7.9* 8.3*   ALBUMIN 2.1* 1.8* 2.3*     Estimated GFR/CRCL = Estimated Creatinine Clearance: 71.5 mL/min (by C-G formula based on SCr of 0.62 mg/dL).  Recent Labs     04/02/21 0137 04/03/21 0238 04/04/21 0257   GLUCOSE 113* 126* 121*     Recent Labs     04/02/21 0137 04/03/21 0238 04/04/21  0257   ASTSGOT 137* 123* 69*   ALTSGPT 374* 244* 173*   TBILIRUBIN 0.3 0.2 0.2   ALKPHOSPHAT 194* 243* 237*   GLOBULIN 3.3 3.8* 3.8*             No results for input(s): INR, APTT, FIBRINOGEN in the last 72 hours.    Invalid input(s): DIMER    MICROBIOLOGY:   No results found for: BLOODCULTU, BLDCULT, BCHOLD     IMAGING:   EC-ECHOCARDIOGRAM COMPLETE W/ CONT   Final Result      CT-HEAD W/O   Final Result      Subacute left MCA infarct, with loss of gray-white matter differentiation in the left frontal lobe. No hemorrhagic transformation.      MR-BRAIN-WITH & W/O   Final Result   Addendum 1 of 1   Also noted in the left frontal calvarium is an area of enhancement in the    diploic space measuring approximately 11 mm which corresponds to the    location concerning for lytic lesion on the recent CT of the head. Finding    is concerning for metastatic osseous    disease.      Final      1.  Moderate-sized left middle cerebral artery acute infarct with possible early hemorrhagic transformation.   2.  Scattered punctate and small acute infarcts involving the bilateral parieto-occipital regions and left cerebellar hemisphere.   3.  There is a pattern of mild  supratentorial white matter disease with scattered foci of bright T2 and FLAIR signal in the subcortical and deep white matter of both hemispheres consistent with small vessel ischemic change versus demyelination or    gliosis. Mild diffuse cerebral substance loss.   4.  Mild microangiopathic ischemic change versus demyelination or gliosis.      Attempts to convey these findings to Dr. HERNANDEZ PALOMO were initiated on 3/31/2021 10:44 AM. Findings were discussed with Dr. Palomo on 3/31/2021 at 1056 hours.      CT-CEREBRAL PERFUSION ANALYSIS   Final Result      1.  Normal study. Cerebral blood flow less than 30% likely representing completed infarct = 0 mL.      2.  T Max more than 6 seconds likely representing combination of completed infarct and ischemia = 0 mL.      3.  Mismatched volume likely representing ischemic brain/penumbra = None      4.  Please note that the cerebral perfusion was performed on the limited brain tissue around the basal ganglia region. Infarct/ischemia outside the CT perfusion sections can be missed in this study.      CT-CTA NECK WITH & W/O-POST PROCESSING   Final Result      Beaded appearance of the cervical carotid and vertebral arteries most suggestive of fibromuscular dysplasia.      No significant stenosis or dissection.      Lytic lesion in the C7 vertebral body and the left frontal calvarium. Malignancy is concerning and must be excluded.      CT-CTA HEAD WITH & W/O-POST PROCESS   Final Result      Acute/subacute left MCA infarct.      No large vessel occlusion or hemodynamically significant stenosis.      Small lytic lesion in the left frontal calvaria concerning for malignancy.      TU-FQHAURE-1 VIEW   Final Result         1.  Nonspecific bowel gas pattern.      US-EXTREMITY VENOUS LOWER BILAT   Final Result      MR-PELVIS-WITH & W/O AND SEQUENCES   Final Result         1. Stage IV endometrial cancer. The endometrial mass itself measures 13 cm, invading through the anterior  uterine wall and uterine fundus.   2. Metastatic parametrial, external iliac and para-aortic lymphadenopathy.   3. Multiple pelvic osseous metastases.   4. Several uterine fibroids.            US-PELVIC TRANSABDOMINAL ONLY   Final Result      Enlarged, heterogeneous uterus could indicate underlying fibroids though malignancy cannot be excluded.      Endometrial stripe not visualized.      CT-CTA COMPLETE THORACOABDOMINAL AORTA   Final Result      No evidence of aortic aneurysm or dissection.      Partial visualization of a markedly enlarged heterogeneous uterus with hypervascularity concerning for malignancy versus fibroids.      Diverticulosis without evidence of diverticulitis.      Bilateral hydronephrosis and hydroureter likely related to enlarged uterus.      Nonobstructive left renal stone.      Bilateral pulmonary nodules measuring up to 5 mm.      Nodule recommendations:   Low Risk: No routine follow-up      High Risk: Optional CT at 12 months      Comments: Use most suspicious nodule as guide to management. Follow-up intervals may vary according to size and risk.      Low Risk - Minimal or absent history of smoking and of other known risk factors.      High Risk - History of smoking or of other known risk factors.      Note: These recommendations do not apply to lung cancer screening, patients with immunosuppression, or patients with known primary cancer.      Fleischner Society 2017 Guidelines for Management of Incidentally Detected Pulmonary Nodules in Adults      DX-CHEST-PORTABLE (1 VIEW)   Final Result      No acute cardiac or pulmonary abnormalities are identified.          CULTURES:   Results     Procedure Component Value Units Date/Time    URINE CULTURE(NEW) [[id number]] Collected: 03/27/21 1329    Order Status: Completed Specimen: Urine, Clean Catch Updated: 03/29/21 0832     Significant Indicator NEG     Source UR     Site URINE, CLEAN CATCH     Culture Result Usual urogenital marisa 10-50,000  cfu/mL    Narrative:      Indication for culture:->Patient WITHOUT an indwelling Beverly  catheter in place with new onset of Dysuria, Frequency,  Urgency, and/or Suprapubic pain  Indication for culture:->Patient WITHOUT an indwelling Beverly          MEDS:  Current Facility-Administered Medications   Medication Last Admin   • MD Alert...Total Body Iron Replacement per Pharmacy     • enoxaparin (LOVENOX) inj 30 mg 30 mg at 04/04/21 0524   • aspirin EC (ECOTRIN) tablet 81 mg 81 mg at 04/04/21 0525   • oxyCODONE-acetaminophen (PERCOCET) 5-325 MG per tablet 1 tablet 1 tablet at 04/03/21 1358   • amLODIPine (NORVASC) tablet 5 mg 5 mg at 04/03/21 1717   • haloperidol lactate (HALDOL) injection 1 mg 1 mg at 03/31/21 2206   • Pharmacy Consult Request ...Pain Management Review 1 Each     • ondansetron (ZOFRAN) syringe/vial injection 4 mg 4 mg at 04/01/21 1705   • NS (BOLUS) infusion 500 mL     • prochlorperazine (COMPAZINE) injection 10 mg     • senna-docusate (PERICOLACE or SENOKOT S) 8.6-50 MG per tablet 2 tablet Stopped at 04/04/21 0600    And   • polyethylene glycol/lytes (MIRALAX) PACKET 1 Packet      And   • magnesium hydroxide (MILK OF MAGNESIA) suspension 30 mL      And   • bisacodyl (DULCOLAX) suppository 10 mg     • ondansetron (ZOFRAN ODT) dispertab 4 mg         ASSESSMENT/PLAN:   71 y.o. female admitted for uterine mass, suspected malignancy, now status post total abdominal hysterectomy with salpingo-oophorectomy, periaortic and pelvic lymph node dissection, omentectomy, bilateral ureterolysis with peritoneal biopsies, cystoscopy repair and bilateral ureteral stent placement on 3/29/2021     #Metastatic uterine cancer, adenocarcinoma  S/p surgery including hysterectomy, oophorectomy, omentecomy 3/29/2021; POD # 6.  Pathology report has confirmed metastatic carcinosarcoma  -Further recommendations per Gyn/Onc: Per Dr. Quinteros, patient and SO, Star, seem interested in continued discussions of goals of care  -Palliative  consult on 4/2/21 recommended cognitive evaluation with SLP, now ordered and pending; recommendations appreciated  -Awaiting patient visit with oncologist     #Acute left MCA infarct  Patient with development of expressive aphasia and confusion on 3/30/2021 with subsequent findings of acute infarction.  Per neurology note, pattern of infarct suspicious for cardioembolic phenomenon possibly related to hypercoagulability due to underlying cancer.  -Repeat CT head without contrast ruled out hemorrhagic conversion  -PT/OT/speech ordered  -FEES pending per speech  -Echocardiogram ordered for possible cardioembolic origin of MRI brain findings; echo has now returned with EF 60%; mildly dilated LA; RVSP 29 mm Hg   -Overall prognosis is poor and would like to involve palliative care  -Patient will likely require postacute rehab for speech therapy  -Diet advanced 4/2/21 per SLP; recommendations appreciated  -Aspirin started 4/2/21     #Acute transaminitis  Patient with acute rise in transaminases, suspected to be due to shock liver  -Ammonia level normal  -Holdin statin, Tylenol; hold all other hepatotoxins  -The downward trend is continuing today, with AST and ALT 69 and 173, respectively  -Continue to monitor     #Iron deficiency anemia due to chronic blood loss  -Hemoglobin this AM is 7.7  -Aspirin and lovenox were started 4/2  -Due to low MCV and chronic blood loss, will start IV Iron per pharmacy today  -Continue to monitor, transfuse as indicated for hemoglobin less than 7     #Hypokalemia  Continue to monitor, replace as indicated     #Hyperlipidemia  -Holding statin as above     #Essential hypertension  -Holding home amlodipine     #DVT Prophylaxis  -Per gynecology, OK to start lovenox DVT ppx once hemoglobin has stabilized  -Lovenox started 4/2/21     Core Measures:  DVT PPX: Lovenox, SCDs  ABX: None  Lines: PIV in b/l upper extremities  Fluids: None  PCP: Shan Tuttle MD  CODE  STATUS: Full     Dispo: Inpatient      Josue White MD   PGY-1 Family Medicine Resident   [facility]

## 2021-04-05 LAB
ALBUMIN SERPL BCP-MCNC: 2.5 G/DL (ref 3.2–4.9)
ALBUMIN/GLOB SERPL: 0.6 G/DL
ALP SERPL-CCNC: 263 U/L (ref 30–99)
ALT SERPL-CCNC: 156 U/L (ref 2–50)
ANION GAP SERPL CALC-SCNC: 11 MMOL/L (ref 7–16)
AST SERPL-CCNC: 95 U/L (ref 12–45)
BASOPHILS # BLD AUTO: 0.4 % (ref 0–1.8)
BASOPHILS # BLD: 0.08 K/UL (ref 0–0.12)
BILIRUB SERPL-MCNC: 0.2 MG/DL (ref 0.1–1.5)
BUN SERPL-MCNC: 10 MG/DL (ref 8–22)
CALCIUM SERPL-MCNC: 8.5 MG/DL (ref 8.5–10.5)
CHLORIDE SERPL-SCNC: 100 MMOL/L (ref 96–112)
CO2 SERPL-SCNC: 25 MMOL/L (ref 20–33)
CREAT SERPL-MCNC: 0.65 MG/DL (ref 0.5–1.4)
EOSINOPHIL # BLD AUTO: 0.38 K/UL (ref 0–0.51)
EOSINOPHIL NFR BLD: 2.1 % (ref 0–6.9)
GLOBULIN SER CALC-MCNC: 3.9 G/DL (ref 1.9–3.5)
GLUCOSE SERPL-MCNC: 112 MG/DL (ref 65–99)
HCT VFR BLD AUTO: 25.1 % (ref 37–47)
HGB BLD-MCNC: 8 G/DL (ref 12–16)
IMM GRANULOCYTES # BLD AUTO: 0.57 K/UL (ref 0–0.11)
IMM GRANULOCYTES NFR BLD AUTO: 3.2 % (ref 0–0.9)
LYMPHOCYTES # BLD AUTO: 1.44 K/UL (ref 1–4.8)
LYMPHOCYTES NFR BLD: 8.1 % (ref 22–41)
MCH RBC QN AUTO: 25.4 PG (ref 27–33)
MCHC RBC AUTO-ENTMCNC: 31.9 G/DL (ref 33.6–35)
MCV RBC AUTO: 79.7 FL (ref 81.4–97.8)
MONOCYTES # BLD AUTO: 1.42 K/UL (ref 0–0.85)
MONOCYTES NFR BLD AUTO: 8 % (ref 0–13.4)
NEUTROPHILS # BLD AUTO: 13.89 K/UL (ref 2–7.15)
NEUTROPHILS NFR BLD: 78.2 % (ref 44–72)
NRBC # BLD AUTO: 0.08 K/UL
NRBC BLD-RTO: 0.4 /100 WBC
PLATELET # BLD AUTO: 401 K/UL (ref 164–446)
PMV BLD AUTO: 9 FL (ref 9–12.9)
POTASSIUM SERPL-SCNC: 4 MMOL/L (ref 3.6–5.5)
PROT SERPL-MCNC: 6.4 G/DL (ref 6–8.2)
RBC # BLD AUTO: 3.15 M/UL (ref 4.2–5.4)
SODIUM SERPL-SCNC: 136 MMOL/L (ref 135–145)
WBC # BLD AUTO: 17.8 K/UL (ref 4.8–10.8)

## 2021-04-05 PROCEDURE — 700105 HCHG RX REV CODE 258: Performed by: STUDENT IN AN ORGANIZED HEALTH CARE EDUCATION/TRAINING PROGRAM

## 2021-04-05 PROCEDURE — 92526 ORAL FUNCTION THERAPY: CPT

## 2021-04-05 PROCEDURE — 80053 COMPREHEN METABOLIC PANEL: CPT

## 2021-04-05 PROCEDURE — 700111 HCHG RX REV CODE 636 W/ 250 OVERRIDE (IP): Performed by: STUDENT IN AN ORGANIZED HEALTH CARE EDUCATION/TRAINING PROGRAM

## 2021-04-05 PROCEDURE — 700102 HCHG RX REV CODE 250 W/ 637 OVERRIDE(OP): Performed by: STUDENT IN AN ORGANIZED HEALTH CARE EDUCATION/TRAINING PROGRAM

## 2021-04-05 PROCEDURE — A9270 NON-COVERED ITEM OR SERVICE: HCPCS | Performed by: STUDENT IN AN ORGANIZED HEALTH CARE EDUCATION/TRAINING PROGRAM

## 2021-04-05 PROCEDURE — 770020 HCHG ROOM/CARE - TELE (206)

## 2021-04-05 PROCEDURE — 36415 COLL VENOUS BLD VENIPUNCTURE: CPT

## 2021-04-05 PROCEDURE — 92523 SPEECH SOUND LANG COMPREHEN: CPT

## 2021-04-05 PROCEDURE — 85025 COMPLETE CBC W/AUTO DIFF WBC: CPT

## 2021-04-05 RX ORDER — OXYCODONE HYDROCHLORIDE 5 MG/1
5 TABLET ORAL EVERY 6 HOURS PRN
Status: DISCONTINUED | OUTPATIENT
Start: 2021-04-05 | End: 2021-04-09 | Stop reason: HOSPADM

## 2021-04-05 RX ADMIN — SODIUM CHLORIDE 125 MG: 9 INJECTION, SOLUTION INTRAVENOUS at 17:25

## 2021-04-05 RX ADMIN — ENOXAPARIN SODIUM 30 MG: 30 INJECTION SUBCUTANEOUS at 04:43

## 2021-04-05 RX ADMIN — AMLODIPINE BESYLATE 5 MG: 5 TABLET ORAL at 17:24

## 2021-04-05 RX ADMIN — ASPIRIN 81 MG: 81 TABLET, COATED ORAL at 04:43

## 2021-04-05 ASSESSMENT — FIBROSIS 4 INDEX: FIB4 SCORE: 1.31

## 2021-04-05 ASSESSMENT — PAIN DESCRIPTION - PAIN TYPE: TYPE: ACUTE PAIN

## 2021-04-05 NOTE — PROGRESS NOTES
[facility] [place] FAMILY MEDICINE PROGRESS NOTE     Attending: Dr. Uvaldo Diaz    Resident: Dr. Otilia iHcks    PATIENT: Graciela Desai; [id number]; 1950 Hospital Day: 12    71 y.o. female admitted for anemia and large uterine mass now POD #7 s/p     SUBJECTIVE: No acute events overnight, improved expressive aphasia, now using three word phrases and writing. Normal appetite, normal BM, no pain, fevers, chills, head ache SOB, or other complaints.     OBJECTIVE:     Vitals:    04/05/21 0034 04/05/21 0436 04/05/21 0729 04/05/21 1112   BP: 128/71 136/80 (!) 97/52 120/72   Pulse: (!) 101 (!) 104 (!) 101 (!) 103   Resp: 16 16 16 17   Temp: 36.5 °C (97.7 °F) 36.6 °C (97.8 °F) 37 °C (98.6 °F) 36.4 °C (97.5 °F)   TempSrc: Temporal Temporal Temporal Temporal   SpO2: 96% 97% 98% 99%   Weight:       Height:           Intake/Output Summary (Last 24 hours) at 4/5/2021 1256  Last data filed at 4/5/2021 0900  Gross per 24 hour   Intake 240 ml   Output 2605 ml   Net -2365 ml       PE:  General: No acute distress, resting comfortably in bed.  HEENT: NC/AT. PERRLA. EOMI. MMM  Cardiovascular: RRR with no murmurs, rubs, or gallops  Respiratory: Symmetric expansion. CTAB with no crackles or wheezing  Abdomen: Soft, minimally tender over KATIUSKA drain site, non distended, no masses, bowel sounds present. KATIUSKA drain in place over LLQ  EXT:  Moves all extremities, no cyanosis, clubbing, or edema. Pulses 2+ and present in all 4  Neuro: alert and oriented to person, place, situation, expressive aphasia present. CN II-XII grossly intact. Strength 5/5 throughout-grossly normal  Skin: In tact without lesions, rashes or appreciable jaundice  Psych: Appropriate affect      LABS:  Recent Labs     04/03/21  0238 04/03/21  0238 04/03/21  1035 04/04/21  0257 04/05/21  0053   WBC 18.1*  --   --  16.8* 17.8*   RBC 2.85*  --   --  3.12* 3.15*   HEMOGLOBIN 7.3*   < > 7.8* 7.7* 8.0*   HEMATOCRIT 21.8*   < > 24.3* 24.6* 25.1*   MCV 76.5*  --   --  78.8* 79.7*   MCH 25.6*   --   --  24.7* 25.4*   PLATELETCT 298  --   --  339 401   MPV 8.9*  --   --  9.3 9.0   NEUTSPOLYS 89.50*  --   --  79.30* 78.20*   LYMPHOCYTES 6.10*  --   --  7.60* 8.10*   MONOCYTES 3.50  --   --  8.50 8.00   EOSINOPHILS 0.00  --   --  2.40 2.10   BASOPHILS 0.00  --   --  0.20 0.40   RBCMORPHOLO Present  --   --   --   --     < > = values in this interval not displayed.     Recent Labs     04/03/21 0238 04/04/21  0257 04/05/21  0053   SODIUM 134* 137 136   POTASSIUM 3.9 4.1 4.0   CHLORIDE 102 103 100   CO2 24 26 25   BUN 9 9 10   CREATININE 0.61 0.62 0.65   CALCIUM 7.9* 8.3* 8.5   ALBUMIN 1.8* 2.3* 2.5*     Estimated GFR/CRCL = Estimated Creatinine Clearance: 68.7 mL/min (by C-G formula based on SCr of 0.65 mg/dL).  Recent Labs     04/03/21 0238 04/04/21  0257 04/05/21  0053   GLUCOSE 126* 121* 112*     Recent Labs     04/03/21 0238 04/04/21 0257 04/05/21  0053   ASTSGOT 123* 69* 95*   ALTSGPT 244* 173* 156*   TBILIRUBIN 0.2 0.2 0.2   ALKPHOSPHAT 243* 237* 263*   GLOBULIN 3.8* 3.8* 3.9*             No results for input(s): INR, APTT, FIBRINOGEN in the last 72 hours.    Invalid input(s): DIMER      IMAGING:   EC-ECHOCARDIOGRAM COMPLETE W/ CONT   Final Result      CT-HEAD W/O   Final Result      Subacute left MCA infarct, with loss of gray-white matter differentiation in the left frontal lobe. No hemorrhagic transformation.      MR-BRAIN-WITH & W/O   Final Result   Addendum 1 of 1   Also noted in the left frontal calvarium is an area of enhancement in the    diploic space measuring approximately 11 mm which corresponds to the    location concerning for lytic lesion on the recent CT of the head. Finding    is concerning for metastatic osseous    disease.      Final      1.  Moderate-sized left middle cerebral artery acute infarct with possible early hemorrhagic transformation.   2.  Scattered punctate and small acute infarcts involving the bilateral parieto-occipital regions and left cerebellar hemisphere.   3.   There is a pattern of mild supratentorial white matter disease with scattered foci of bright T2 and FLAIR signal in the subcortical and deep white matter of both hemispheres consistent with small vessel ischemic change versus demyelination or    gliosis. Mild diffuse cerebral substance loss.   4.  Mild microangiopathic ischemic change versus demyelination or gliosis.      Attempts to convey these findings to Dr. HERNANDEZ PALOMO were initiated on 3/31/2021 10:44 AM. Findings were discussed with Dr. Palomo on 3/31/2021 at 1056 hours.      CT-CEREBRAL PERFUSION ANALYSIS   Final Result      1.  Normal study. Cerebral blood flow less than 30% likely representing completed infarct = 0 mL.      2.  T Max more than 6 seconds likely representing combination of completed infarct and ischemia = 0 mL.      3.  Mismatched volume likely representing ischemic brain/penumbra = None      4.  Please note that the cerebral perfusion was performed on the limited brain tissue around the basal ganglia region. Infarct/ischemia outside the CT perfusion sections can be missed in this study.      CT-CTA NECK WITH & W/O-POST PROCESSING   Final Result      Beaded appearance of the cervical carotid and vertebral arteries most suggestive of fibromuscular dysplasia.      No significant stenosis or dissection.      Lytic lesion in the C7 vertebral body and the left frontal calvarium. Malignancy is concerning and must be excluded.      CT-CTA HEAD WITH & W/O-POST PROCESS   Final Result      Acute/subacute left MCA infarct.      No large vessel occlusion or hemodynamically significant stenosis.      Small lytic lesion in the left frontal calvaria concerning for malignancy.      [id number] VIEW   Final Result         1.  Nonspecific bowel gas pattern.      US-EXTREMITY VENOUS LOWER BILAT   Final Result      MR-PELVIS-WITH & W/O AND SEQUENCES   Final Result         1. Stage IV endometrial cancer. The endometrial mass itself measures 13 cm,  invading through the anterior uterine wall and uterine fundus.   2. Metastatic parametrial, external iliac and para-aortic lymphadenopathy.   3. Multiple pelvic osseous metastases.   4. Several uterine fibroids.            US-PELVIC TRANSABDOMINAL ONLY   Final Result      Enlarged, heterogeneous uterus could indicate underlying fibroids though malignancy cannot be excluded.      Endometrial stripe not visualized.      CT-CTA COMPLETE THORACOABDOMINAL AORTA   Final Result      No evidence of aortic aneurysm or dissection.      Partial visualization of a markedly enlarged heterogeneous uterus with hypervascularity concerning for malignancy versus fibroids.      Diverticulosis without evidence of diverticulitis.      Bilateral hydronephrosis and hydroureter likely related to enlarged uterus.      Nonobstructive left renal stone.      Bilateral pulmonary nodules measuring up to 5 mm.      Nodule recommendations:   Low Risk: No routine follow-up      High Risk: Optional CT at 12 months      Comments: Use most suspicious nodule as guide to management. Follow-up intervals may vary according to size and risk.      Low Risk - Minimal or absent history of smoking and of other known risk factors.      High Risk - History of smoking or of other known risk factors.      Note: These recommendations do not apply to lung cancer screening, patients with immunosuppression, or patients with known primary cancer.      Fleischner Society 2017 Guidelines for Management of Incidentally Detected Pulmonary Nodules in Adults      DX-CHEST-PORTABLE (1 VIEW)   Final Result      No acute cardiac or pulmonary abnormalities are identified.            MEDS:  Current Facility-Administered Medications   Medication Last Admin   • ferric gluconate complex (FERRLECIT) 125 mg in  mL IVPB      Followed by   • [START ON 4/6/2021] ferric gluconate complex (FERRLECIT) 250 mg in  mL IVPB     • enoxaparin (LOVENOX) inj 30 mg 30 mg at 04/05/21 0443    • aspirin EC (ECOTRIN) tablet 81 mg 81 mg at 04/05/21 0443   • oxyCODONE-acetaminophen (PERCOCET) 5-325 MG per tablet 1 tablet 1 tablet at 04/03/21 1358   • amLODIPine (NORVASC) tablet 5 mg 5 mg at 04/04/21 1712   • haloperidol lactate (HALDOL) injection 1 mg 1 mg at 03/31/21 2206   • Pharmacy Consult Request ...Pain Management Review 1 Each     • ondansetron (ZOFRAN) syringe/vial injection 4 mg 4 mg at 04/01/21 1705   • NS (BOLUS) infusion 500 mL     • prochlorperazine (COMPAZINE) injection 10 mg     • senna-docusate (PERICOLACE or SENOKOT S) 8.6-50 MG per tablet 2 tablet Stopped at 04/04/21 0600    And   • polyethylene glycol/lytes (MIRALAX) PACKET 1 Packet      And   • magnesium hydroxide (MILK OF MAGNESIA) suspension 30 mL      And   • bisacodyl (DULCOLAX) suppository 10 mg     • ondansetron (ZOFRAN ODT) dispertab 4 mg         ASSESSMENT/PLAN: Ms. 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     Now POD #7 s/p abd hysterectomy, bilat oopherectomy salpingectomy, pelvic and chintan-aortic lymph node dissections, omentectomy, bilateral uretrolysis, peritoneal biopsies, cystotomy repair and bilateral ureteral stent placement on 3/29/21    Course complicated by perioperative L MCA infarct with primary deficit of expressive aphasia.       # Metastatic uterine carcinosarcoma: Stage IVB  Gyn onc Dr. Carey and team following. Appreciate recommendations:    -Ureteral stents x 6 weeks   -Beverly x 3 weeks   -encourage ambulation with assistance, OOB to chair    -PT for post op mobility   - s/p discussion regarding prognosis, treatment options and SE, and expected disease course. Awaiting cognitive evaluation prior to decision making for goals of care discussions with palliative   -Palliative following, appreciate recommendations.     # Perioperative L MCA  Suspected cardioembolic per neurology, c/w CT findings. Repeat CT with no evidence  of hemorrhagic conversion. Echo showing mild dilation of LA and RVSP 29 mm, EF 60%   -Continued PT/OT/SLP   -Continue telemetry monitoring   -FEES pending   -Continue ASA   -Anticipate Postacute rehab for Speech therapy   -f/u palliative recommendations    # Transaminitis   Likely secondary to shock. Downtrending from 800-900's to 95 AST and 156 ALT today.   Daily CMP   Avoid hepatotoxic medications: tylenol, statin  Change percocet to oxycodone 5. Has not needed since 4/3    # Anemia, microcytic  Multifactorial, primarily blood loss and ACD with SARAH, hgb 8 today  IV iron   Transfuse for Hgb <7  Daily CBC    # Leukocytosis  Concerning for infection given uptrending WBC with uptrending bandemia and neutrophilic predominance. CTM. If fevers, low threshold for initiating empiric ABX and pan culture    # HLD  # HTN  Holding statin  Continue amlodipine        Lines: PIV, Beverly, KATIUSKA drain  IVF: none  Abx: None  GI PPx:Multimodal  DVT PPx: SCD's, lovenox  Code: Full  Diet: thick liquids     Dispo:    Otilia Hicks MD  PGY-1 Family Medicine Resident  [facility]

## 2021-04-05 NOTE — CARE PLAN
Problem: Communication  Goal: The ability to communicate needs accurately and effectively will improve  Outcome: PROGRESSING AS EXPECTED     Problem: Pain Management  Goal: Pain level will decrease to patient's comfort goal  Outcome: PROGRESSING AS EXPECTED     Problem: Infection  Goal: Will remain free from infection  Outcome: PROGRESSING AS EXPECTED

## 2021-04-05 NOTE — PROGRESS NOTES
Bedside report received. POC discussed with pt; all questions answered at this time. Call light within reach, fall precautions in place. Patient observed in bed resting with family member at bedside. Patient denied pain or discomfort. Initial shift assessment performed. No s/s of apparent distress noted.

## 2021-04-05 NOTE — NON-PROVIDER
"  [facility] [place] FAMILY MEDICINE PROGRESS NOTE      Attending:   Dr. Diaz      Resident:   Mir Thakur MD    Medical Student:  Tania Olguin, MS3     PATIENT:   Graciela Desai; [id number]; 1950     SUBJECTIVE:   Patient reports sleeping well overnight and denies any pain this morning. Most of the interview conducted using \"yes\" or \"no\" questions as patient continues to have expressive aphasia. Patient is able to make 3 word sentences but takes some time to do so. Reports having a normal bowel movement yesterday and says her appetite is good. Denies fever or chills, abdominal pain, chest pain, SOB, or nausea/vomiting.      OBJECTIVE:  Vitals:    04/04/21 2040 04/05/21 0034 04/05/21 0436 04/05/21 0729   BP: 144/73 128/71 136/80 (!) 97/52   Pulse: (!) 101 (!) 101 (!) 104 (!) 101   Resp: 18 16 16 16   Temp: 36.2 °C (97.2 °F) 36.5 °C (97.7 °F) 36.6 °C (97.8 °F) 37 °C (98.6 °F)   TempSrc: Temporal Temporal Temporal Temporal   SpO2: 97% 96% 97% 98%   Weight: 64.9 kg (143 lb)      Height:           Intake/Output Summary (Last 24 hours) at 4/5/2021 1058  Last data filed at 4/5/2021 0900  Gross per 24 hour   Intake 240 ml   Output 2605 ml   Net -2365 ml          PHYSICAL EXAM:   General: Thin, quiet woman in no acute distress, afebrile, resting comfortably in bed  HEENT: NC/AT. EOMI.   Cardiovascular: RRR without murmurs, rubs, heaves. Normal capillary refill   Respiratory: CTAB, no tachypnea or retractions   Abdomen: midline surgical incision is cool, dry, and intact; abdomen is soft, nontender, nondistended, no masses  EXT:  no edema  Skin: No erythema/lesions; PIV in left and right upper extremity; each PIV is free of discharge, no erythema, no edema  Neuro: CN II-XII grossly intact, no focal muscle weakness        LABS:  Recent Labs     04/03/21  0238 04/03/21  0238 04/03/21  1035 04/04/21  0257 04/05/21  0053   WBC 18.1*  --   --  16.8* 17.8*   RBC 2.85*  --   --  3.12* 3.15*   HEMOGLOBIN 7.3*   < > 7.8* 7.7* 8.0*   "   HEMATOCRIT 21.8*   < > 24.3* 24.6* 25.1*   MCV 76.5*  --   --  78.8* 79.7*   MCH 25.6*  --   --  24.7* 25.4*   PLATELETCT 298  --   --  339 401   MPV 8.9*  --   --  9.3 9.0   NEUTSPOLYS 89.50*  --   --  79.30* 78.20*   LYMPHOCYTES 6.10*  --   --  7.60* 8.10*   MONOCYTES 3.50  --   --  8.50 8.00   EOSINOPHILS 0.00  --   --  2.40 2.10   BASOPHILS 0.00  --   --  0.20 0.40   RBCMORPHOLO Present  --   --   --   --     < > = values in this interval not displayed.     Recent Labs     04/03/21 0238 04/04/21 0257 04/05/21  0053   SODIUM 134* 137 136   POTASSIUM 3.9 4.1 4.0   CHLORIDE 102 103 100   CO2 24 26 25   GLUCOSE 126* 121* 112*   BUN 9 9 10     Recent Labs     04/03/21 0238 04/04/21  0257 04/05/21  0053   ASTSGOT 123* 69* 95*   ALTSGPT 244* 173* 156*   TBILIRUBIN 0.2 0.2 0.2   ALKPHOSPHAT 243* 237* 263*   GLOBULIN 3.8* 3.8* 3.9*          IMAGING:   EC-ECHOCARDIOGRAM COMPLETE W/ CONT   Final Result       CT-HEAD W/O   Final Result       Subacute left MCA infarct, with loss of gray-white matter differentiation in the left frontal lobe. No hemorrhagic transformation.       MR-BRAIN-WITH & W/O   Final Result   Addendum 1 of 1   Also noted in the left frontal calvarium is an area of enhancement in the    diploic space measuring approximately 11 mm which corresponds to the    location concerning for lytic lesion on the recent CT of the head. Finding    is concerning for metastatic osseous    disease.       Final       1.  Moderate-sized left middle cerebral artery acute infarct with possible early hemorrhagic transformation.   2.  Scattered punctate and small acute infarcts involving the bilateral parieto-occipital regions and left cerebellar hemisphere.   3.  There is a pattern of mild supratentorial white matter disease with scattered foci of bright T2 and FLAIR signal in the subcortical and deep white matter of both hemispheres consistent with small vessel ischemic change versus demyelination or    gliosis. Mild  diffuse cerebral substance loss.   4.  Mild microangiopathic ischemic change versus demyelination or gliosis.       Attempts to convey these findings to Dr. HERNANDEZ PALOMO were initiated on 3/31/2021 10:44 AM. Findings were discussed with Dr. Palomo on 3/31/2021 at 1056 hours.       CT-CEREBRAL PERFUSION ANALYSIS   Final Result       1.  Normal study. Cerebral blood flow less than 30% likely representing completed infarct = 0 mL.       2.  T Max more than 6 seconds likely representing combination of completed infarct and ischemia = 0 mL.       3.  Mismatched volume likely representing ischemic brain/penumbra = None       4.  Please note that the cerebral perfusion was performed on the limited brain tissue around the basal ganglia region. Infarct/ischemia outside the CT perfusion sections can be missed in this study.       CT-CTA NECK WITH & W/O-POST PROCESSING   Final Result       Beaded appearance of the cervical carotid and vertebral arteries most suggestive of fibromuscular dysplasia.       No significant stenosis or dissection.       Lytic lesion in the C7 vertebral body and the left frontal calvarium. Malignancy is concerning and must be excluded.       CT-CTA HEAD WITH & W/O-POST PROCESS   Final Result       Acute/subacute left MCA infarct.       No large vessel occlusion or hemodynamically significant stenosis.       Small lytic lesion in the left frontal calvaria concerning for malignancy.       [id number] VIEW   Final Result           1.  Nonspecific bowel gas pattern.       US-EXTREMITY VENOUS LOWER BILAT   Final Result       MR-PELVIS-WITH & W/O AND SEQUENCES   Final Result           1. Stage IV endometrial cancer. The endometrial mass itself measures 13 cm, invading through the anterior uterine wall and uterine fundus.   2. Metastatic parametrial, external iliac and para-aortic lymphadenopathy.   3. Multiple pelvic osseous metastases.   4. Several uterine fibroids.               US-PELVIC  TRANSABDOMINAL ONLY   Final Result       Enlarged, heterogeneous uterus could indicate underlying fibroids though malignancy cannot be excluded.       Endometrial stripe not visualized.       CT-CTA COMPLETE THORACOABDOMINAL AORTA   Final Result       No evidence of aortic aneurysm or dissection.       Partial visualization of a markedly enlarged heterogeneous uterus with hypervascularity concerning for malignancy versus fibroids.       Diverticulosis without evidence of diverticulitis.       Bilateral hydronephrosis and hydroureter likely related to enlarged uterus.       Nonobstructive left renal stone.       Bilateral pulmonary nodules measuring up to 5 mm.       Nodule recommendations:   Low Risk: No routine follow-up       High Risk: Optional CT at 12 months       Comments: Use most suspicious nodule as guide to management. Follow-up intervals may vary according to size and risk.       Low Risk - Minimal or absent history of smoking and of other known risk factors.       High Risk - History of smoking or of other known risk factors.       Note: These recommendations do not apply to lung cancer screening, patients with immunosuppression, or patients with known primary cancer.       Fleischner Society 2017 Guidelines for Management of Incidentally Detected Pulmonary Nodules in Adults       DX-CHEST-PORTABLE (1 VIEW)   Final Result       No acute cardiac or pulmonary abnormalities are identified.             CULTURES:            Results      Procedure Component Value Units Date/Time     URINE CULTURE(NEW) [[id number]] Collected: 03/27/21 1329     Order Status: Completed Specimen: Urine, Clean Catch Updated: 03/29/21 0832       Significant Indicator NEG       Source UR       Site URINE, CLEAN CATCH       Culture Result Usual urogenital marisa 10-50,000 cfu/mL     Narrative:       Indication for culture:->Patient WITHOUT an indwelling Beverly  catheter in place with new onset of Dysuria, Frequency,  Urgency, and/or  Suprapubic pain  Indication for culture:->Patient WITHOUT an indwelling Beverly             MEDS:       Current Facility-Administered Medications   Medication Last Admin   • MD Alert...Total Body Iron Replacement per Pharmacy     • enoxaparin (LOVENOX) inj 30 mg 30 mg at 04/04/21 0524   • aspirin EC (ECOTRIN) tablet 81 mg 81 mg at 04/04/21 0525   • oxyCODONE-acetaminophen (PERCOCET) 5-325 MG per tablet 1 tablet 1 tablet at 04/03/21 1358   • amLODIPine (NORVASC) tablet 5 mg 5 mg at 04/03/21 1717   • haloperidol lactate (HALDOL) injection 1 mg 1 mg at 03/31/21 2206   • Pharmacy Consult Request ...Pain Management Review 1 Each     • ondansetron (ZOFRAN) syringe/vial injection 4 mg 4 mg at 04/01/21 1705   • NS (BOLUS) infusion 500 mL     • prochlorperazine (COMPAZINE) injection 10 mg     • senna-docusate (PERICOLACE or SENOKOT S) 8.6-50 MG per tablet 2 tablet Stopped at 04/04/21 0600     And   • polyethylene glycol/lytes (MIRALAX) PACKET 1 Packet       And   • magnesium hydroxide (MILK OF MAGNESIA) suspension 30 mL       And   • bisacodyl (DULCOLAX) suppository 10 mg     • ondansetron (ZOFRAN ODT) dispertab 4 mg           ASSESSMENT/PLAN:   Graciela is a 71 y.o. female admitted for chest and back pain on 3/26/21 attributed to uterine mass discovered on imaging, now 7 days status post total abdominal hysterectomy with salpingo-oophorectomy, periaortic and pelvic lymph node dissection, omentectomy, bilateral ureterolysis with peritoneal biopsies, cystoscopy repair and bilateral ureteral stent placement. 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      #Metastatic uterine cancer, adenocarcinoma  S/p total abdominal hysterectomy, oophorectomy, omentecomy 3/29/2021; POD # 7.  Pathology report confirmed metastatic endometrial carcinosarcoma  -Further recommendations per Gyn/Onc: Per Dr. Quinteros, patient and SO, Star, seem interested in continued discussions of goals of care  -Palliative  consult on 4/2/21 recommended cognitive evaluation with SLP, now ordered and pending; recommendations appreciated  -Awaiting patient visit with oncologist     #Acute left MCA infarct  Development of expressive aphasia with confusion on 3/30/2021, subsequent findings of acute infarction upon imaging.  Per neurology, pattern of infarct suspicious for cardioembolic phenomenon (likely related to hypercoagulability due to underlying cancer)  -Repeat CT head without contrast ruled out hemorrhagic conversion  -PT/OT/speech ordered  -FEES pending per speech  -Echocardiogram ordered for possible cardioembolic origin of MRI brain findings; echo has now returned with EF 60%; mildly dilated LA; RVSP 29 mm Hg   -Overall prognosis is poor and would like to involve palliative care  -Patient will likely require postacute rehab for speech therapy  -Diet advanced 4/2/21 per SLP; recommendations appreciated  -Aspirin started 4/2/21     #Leukocytosis with bandemia  -WBC of 17.8 today, up from 16.8 yesterday  -left-shift noted on differential  -Pulse 101 and BP 97/52 this AM  -continue to monitor for signs of fever, has been afebrile with temp 98.6 F this AM  -monitor closely to assess possible need for antibiotics     #Acute transaminitis  Patient with acute rise in transaminases, suspected to be due to shock liver  -Ammonia level normal  -Holding statin, Tylenol; hold all other hepatotoxins  -Hold Percocet  -The downward trend is continuing today, with AST and ALT 95 and 156, respectively  -Continue to monitor     #Iron deficiency anemia due to chronic blood loss  -Hemoglobin this AM is 8.0  -Aspirin and lovenox were started 4/2  -Due to low MCV and chronic blood loss, will started IV Iron per pharmacy 4/4  -Continue to monitor, transfuse as indicated for hemoglobin less than 7     #Hypokalemia  Continue to monitor, replace as indicated  -K this AM is 4.0, up from 3.4 on admission     #Hyperlipidemia  -Holding statin as  above     #Essential hypertension  -Holding home amlodipine     #DVT Prophylaxis  -Per gynecology, OK to start lovenox DVT ppx once hemoglobin has stabilized  -Lovenox started 4/2/21     Core Measures:  DVT PPX: Lovenox, SCDs  ABX: None  Lines: PIV in b/l upper extremities  Fluids: None  PCP: Shan Tuttle MD  CODE STATUS: Full     Dispo: Inpatient

## 2021-04-05 NOTE — THERAPY
Speech Language Pathology  Daily Treatment     Patient Name: Graciela Desai  Age:  71 y.o., Sex:  female  Medical Record #: [id number]  Today's Date: 4/5/2021     Precautions  Precautions: Fall Risk, Swallow Precautions ( See Comments)  Comments: acute CVA, L MCA, bilat parieto-occipital, Left cerebellar    Assessment    Dysphagia follow up completed with pt up in chair, SO present at bedside. Pt seen with lunch tray of liquidized solids and mildly thick liquids (soup, liquidized peas) with additional trials of applesauce and moistened cracker. Pt self-fed all trials. Pt impulsive with intake and required cues for slow rate, small bites/sips, and intermittent reminders to use spoon vs drinking from edge of bowl. Pt noted with intermittent oral holding of applesauce, with majority of bolus remaining in bilateral lateral sulci requiring verbal and tactile cue to swallow and clear from oral cavity. Moistened also remained in bilateral sucli, requiring liquid rinse to clear. No overt s/s aspiration/penetration noted across trials. Recommend continue liquidized solids and mildly thick liquids by teaspoon with medications crushed with applesauce. SLP will continue to follow.     Plan    Continue current treatment plan.    Discharge Recommendations: Recommend post-acute placement for additional speech therapy services prior to discharge home       Objective       04/05/21 1416   Vitals   O2 Delivery Device None - Room Air   Dysphagia    Dysphagia X   Positioning / Behavior Modification Modulate Rate or Bite Size;Multiple Swallows   Other Treatments lunch tray of LQ3/MT2, additional trials of softened cracker   Diet / Liquid Recommendation Liquidised (3) - (Nectar Thick Full Liquid);Mildly Thick (2) - (Nectar Thick)   Nutritional Liquid Intake Rating Scale Thickened beverages (mildly thick unless otherwise specified)   Nutritional Food Intake Rating Scale Total oral diet of a single consistency   Nursing Communication  "Swallow Precaution Sign Posted at Head of Bed   Skilled Intervention Compensatory Strategies;Verbal Cueing;Tactile Cueing   Recommended Route of Medication Administration   Medication Administration  Other (See Comments)  (crush all medications in applesauce)   Patient / Family Goals   Patient / Family Goal #1 \"I can eat that\" (re: cracker)   Goal #1 Outcome Progressing as expected   Short Term Goals   Short Term Goal # 1 4/2 revised Pt will consume LQ3/MT2 with 1-1 feeding and use of posted and recommended swallow strategies.    Goal Outcome # 1 Progressing as expected         "

## 2021-04-05 NOTE — THERAPY
"Speech Language Pathology   Initial Assessment     Patient Name: Graciela Desai  AGE:  71 y.o., SEX:  female  Medical Record #: [id number]  Today's Date: 4/5/2021     Precautions  Precautions: Fall Risk, Swallow Precautions ( See Comments)(expressive and receptive language deficits)  Comments: acute CVA, L MCA, bilat parieto-occipital, Left cerebellar    Assessment    Patient is 71 y.o. female admitted 3/25/21 with epigastric and back pain, with diagnoses of anemia, essential HTN, uterine mass post surgery for metastatic endometrial cancer 3/29/21.  Course complicated by AMS 3/30/21 with new onset expressive aphasia without extremity weakness. MRI 3/31 revealed \"1. Moderate-sized left middle cerebral artery acute infarct with possible early hemorrhagic transformation. 2. Scattered punctate and small acute infarcts involving the bilateral parieto-occipital regions and left cerebellar hemisphere. ... Addendum: Also noted in the left frontal calvarium is an area of enhancement in the diploic space measuring approximately 11 mm which corresponds to the location concerning for lytic lesion on the recent CT of the head. Finding is concerning for metastatic osseous disease.\" Additional factors influencing patient status/progress: expressive and receptive language deficits.    Language evaluation completed with pt's significant other, Star, present at bedside. The Western Aphasia Battery-Bedside and other non-standard tasks were completed at bedside (see below). The pt presents with moderate-severe expressive and receptive aphasia, complicated by dysarthria and apraxia. Speech is characterized by halting, effortful speech with frequent perseverations with islands of fluent and error-free spontaneous speech. Pt is aware of word finding difficulty but did not appear to self-monitor with perseverative or confabulatory speech. Pt with relative strength in yes/no questions with repetition needed at times and following one-step " commands with repetition and 1:1 model. Moderate-severe deficits noted with object naming, repetition, sequential commands, and automatics (counted 1-21 with 2 errors, unable to complete remaining automatic tasks due to perseveration). Unable to provide biographical information other than full name. Severe-profound deficits with reading and writing. Pt wrote her full name with one error. Unable to write address or copy written letters accurately. Pt also noted with intermittent, inconsistent phonemic errors and groping consistent with apraxia. Attempted use of Yes / No Responses, Phonemic Cues, Reading Written Words, Phase Completion, Stop / Pause Cues, Repetition / Imitation, Category Association, and gestures to improve verbal output with limited success. Education provided to pt and SO re: aphasia, apraxia, and SLP role in plan of care.     Please note: Unable to complete cognitive evaluation given pt's severity of expressive and receptive language deficits. Questions re: capacity are deferred to psych team.     Plan    Recommend Speech Therapy 5 times per week until therapy goals are met for the following treatments:  Dysphagia Training, Comprehension Training, Expression Training, Reading Training, Writing Training and Patient / Family / Caregiver Education.    Discharge Recommendations: Recommend post-acute placement for additional speech therapy services prior to discharge home         Objective     04/05/21 1507   Vitals   O2 Delivery Device None - Room Air   Prior Living Situation   Prior Services Intermittent Physical Support for ADL Per Family   Housing / Facility 1 Story Apartment / Condo   Steps Into Home 2   Equipment Owned 4-Wheel Walker   Prior Level Of Function   Communication Within Functional Limits   Swallow Within Functional Limits   Dentition Edentulous   Dentures Lowers   Hearing Within Functional Limits for Evaluation   Hearing Aid None   Vision Within Functional Limits for Evaluation    Patient's Primary Language English   Education High School Graduate or GED   Education Years Completed 12   Occupation (Pre-Hospital Vocational) Homemaker   Comments pt and SO reported pt received too much anesthesia in 1978 w/ acute exp lang deficits   Verbal Expression   Verbal Expression / Aphasia Eval (WDL) X   Vocal Quality Clear   Verbal Output Automatic Severe (2)   Verbal Output: Phrases Severe (2)   Verbal Output Conversation   (moderate-severe)   Verbal Output Functional   (moderate-severe)   Repetition: Single Words Supervision (5)   Repetition: Phrases Moderate (3)   Repetition: Sentences Severe (2)   Naming   (moderate-severe)   Dysarthria Moderate (3)   Apraxia: Oral Severe (2)   Apraxia: Verbal Moderate (3)   Confabulations Minimal (4)   Perseverations Moderate (3)   Word Finding Deficits   (moderate-severe)   Paraphasias Moderate (3)   Skilled Intervention Compensatory Strategies;Verbal Cueing;External Aids;Other (See Comments)  (sentence completion, phonemic cues)   Comments islands of fluent, spontaneous speech   Auditory Comprehension   Auditory Comprehension (WDL) X   Yes / No Questions: Personal Information Within Functional Limits (6-7)   Yes / No Questions: General Information Within Functional Limits (6-7)   Yes / No Questions: Abstract Supervision (5)   Identifies Objects   (moderate-severe)   Identifies Body Parts   (moderate-severe)   Follows One Unit Commands Moderate (3)   Follows Two Unit Commands Severe (2)   Follows Three Unit Commands Profound (1)   Comments benefits from short, 1-step commands w/ model   Reading Comprehension   Reading Comprehension (WDL) X   Reading Words Severe (2)   Reading Sentences Severe (2)   Written Expression   Written Expression (WDL) X   Ability To Copy: Letters Severe (2)   Functional Writing: Name Supervision (5)   Functional Writing Dictation: Word Profound (1)   Functional Writing to Dictation: Sentence Profound (1)   Comments wrote name  "independently with one error   Cognitive-Linguistic   Level of Consciousness Alert   Aphasia Compensatory Strategies   Compensatory Strategies Used To Communicate Yes / No Responses;Phonemic Cues;Reading Written Words;Phase Completion;Stop / Pause Cues;Repetition / Imitation;Category Association   Outcome Measures   Outcome Measures Utilized WAB-R   WAB - Bedside (Western Aphasia Battery)   Spontaneous Speech: Content  5   Spontaneous Speech: Fluency 4   Auditory Comprehension 9   Sequential Commands 3   Repetition Score  4   Object Naming 6   Writing 1   Apraxia  4   Bedside Asphasia Score 51.67   Patient / Family Goals   Patient / Family Goal #1 \"I had this happen before\"   Short Term Goals   Short Term Goal # 2 Pt will improve functional communication skills with use of multimodal communication strategies to make wants/needs known given moderate support,   Short Term Goal # 3 Pt will follow 1-step commands with 90% accuracy given min cues.         "

## 2021-04-05 NOTE — CARE PLAN
Problem: Nutritional:  Goal: Achieve adequate nutritional intake  Description: Diet will advance >NPO/clear liquids and then patient will consume >50% of meals.   Outcome: MET. Diet advanced to Liquidised, Mildly thick liquids. Pt reports eating 100% of meals. RD  to screen weekly per department policy.

## 2021-04-06 LAB
ALBUMIN SERPL BCP-MCNC: 2.6 G/DL (ref 3.2–4.9)
ALBUMIN/GLOB SERPL: 0.6 G/DL
ALP SERPL-CCNC: 267 U/L (ref 30–99)
ALT SERPL-CCNC: 137 U/L (ref 2–50)
ANION GAP SERPL CALC-SCNC: 12 MMOL/L (ref 7–16)
AST SERPL-CCNC: 82 U/L (ref 12–45)
BASOPHILS # BLD AUTO: 0.3 % (ref 0–1.8)
BASOPHILS # BLD: 0.06 K/UL (ref 0–0.12)
BILIRUB SERPL-MCNC: 0.2 MG/DL (ref 0.1–1.5)
BUN SERPL-MCNC: 10 MG/DL (ref 8–22)
CALCIUM SERPL-MCNC: 8.7 MG/DL (ref 8.5–10.5)
CHLORIDE SERPL-SCNC: 101 MMOL/L (ref 96–112)
CO2 SERPL-SCNC: 25 MMOL/L (ref 20–33)
CREAT SERPL-MCNC: 0.69 MG/DL (ref 0.5–1.4)
EOSINOPHIL # BLD AUTO: 0.34 K/UL (ref 0–0.51)
EOSINOPHIL NFR BLD: 1.8 % (ref 0–6.9)
GLOBULIN SER CALC-MCNC: 4.1 G/DL (ref 1.9–3.5)
GLUCOSE SERPL-MCNC: 125 MG/DL (ref 65–99)
HCT VFR BLD AUTO: 26.1 % (ref 37–47)
HGB BLD-MCNC: 8.2 G/DL (ref 12–16)
IMM GRANULOCYTES # BLD AUTO: 0.54 K/UL (ref 0–0.11)
IMM GRANULOCYTES NFR BLD AUTO: 2.9 % (ref 0–0.9)
LYMPHOCYTES # BLD AUTO: 1.49 K/UL (ref 1–4.8)
LYMPHOCYTES NFR BLD: 8 % (ref 22–41)
MCH RBC QN AUTO: 24.9 PG (ref 27–33)
MCHC RBC AUTO-ENTMCNC: 31.4 G/DL (ref 33.6–35)
MCV RBC AUTO: 79.3 FL (ref 81.4–97.8)
MONOCYTES # BLD AUTO: 1.3 K/UL (ref 0–0.85)
MONOCYTES NFR BLD AUTO: 7 % (ref 0–13.4)
NEUTROPHILS # BLD AUTO: 14.84 K/UL (ref 2–7.15)
NEUTROPHILS NFR BLD: 80 % (ref 44–72)
NRBC # BLD AUTO: 0.1 K/UL
NRBC BLD-RTO: 0.5 /100 WBC
PLATELET # BLD AUTO: 497 K/UL (ref 164–446)
PMV BLD AUTO: 9.1 FL (ref 9–12.9)
POTASSIUM SERPL-SCNC: 4.1 MMOL/L (ref 3.6–5.5)
PROT SERPL-MCNC: 6.7 G/DL (ref 6–8.2)
RBC # BLD AUTO: 3.29 M/UL (ref 4.2–5.4)
SODIUM SERPL-SCNC: 138 MMOL/L (ref 135–145)
WBC # BLD AUTO: 18.6 K/UL (ref 4.8–10.8)

## 2021-04-06 PROCEDURE — 97116 GAIT TRAINING THERAPY: CPT

## 2021-04-06 PROCEDURE — 97530 THERAPEUTIC ACTIVITIES: CPT

## 2021-04-06 PROCEDURE — A9270 NON-COVERED ITEM OR SERVICE: HCPCS | Performed by: STUDENT IN AN ORGANIZED HEALTH CARE EDUCATION/TRAINING PROGRAM

## 2021-04-06 PROCEDURE — 700111 HCHG RX REV CODE 636 W/ 250 OVERRIDE (IP): Performed by: STUDENT IN AN ORGANIZED HEALTH CARE EDUCATION/TRAINING PROGRAM

## 2021-04-06 PROCEDURE — 700102 HCHG RX REV CODE 250 W/ 637 OVERRIDE(OP): Performed by: STUDENT IN AN ORGANIZED HEALTH CARE EDUCATION/TRAINING PROGRAM

## 2021-04-06 PROCEDURE — 700111 HCHG RX REV CODE 636 W/ 250 OVERRIDE (IP): Performed by: OBSTETRICS & GYNECOLOGY

## 2021-04-06 PROCEDURE — 700105 HCHG RX REV CODE 258: Performed by: STUDENT IN AN ORGANIZED HEALTH CARE EDUCATION/TRAINING PROGRAM

## 2021-04-06 PROCEDURE — 80053 COMPREHEN METABOLIC PANEL: CPT

## 2021-04-06 PROCEDURE — 36415 COLL VENOUS BLD VENIPUNCTURE: CPT

## 2021-04-06 PROCEDURE — 99221 1ST HOSP IP/OBS SF/LOW 40: CPT | Performed by: PSYCHIATRY & NEUROLOGY

## 2021-04-06 PROCEDURE — 770020 HCHG ROOM/CARE - TELE (206)

## 2021-04-06 PROCEDURE — 85025 COMPLETE CBC W/AUTO DIFF WBC: CPT

## 2021-04-06 RX ADMIN — ONDANSETRON 4 MG: 2 INJECTION INTRAMUSCULAR; INTRAVENOUS at 16:38

## 2021-04-06 RX ADMIN — ENOXAPARIN SODIUM 30 MG: 30 INJECTION SUBCUTANEOUS at 04:44

## 2021-04-06 RX ADMIN — AMLODIPINE BESYLATE 5 MG: 5 TABLET ORAL at 16:37

## 2021-04-06 RX ADMIN — ASPIRIN 81 MG: 81 TABLET, COATED ORAL at 04:44

## 2021-04-06 RX ADMIN — OXYCODONE 5 MG: 5 TABLET ORAL at 16:37

## 2021-04-06 RX ADMIN — HALOPERIDOL LACTATE 1 MG: 5 INJECTION, SOLUTION INTRAMUSCULAR at 16:37

## 2021-04-06 RX ADMIN — SODIUM CHLORIDE 250 MG: 9 INJECTION, SOLUTION INTRAVENOUS at 16:38

## 2021-04-06 ASSESSMENT — COGNITIVE AND FUNCTIONAL STATUS - GENERAL
WALKING IN HOSPITAL ROOM: A LITTLE
MOBILITY SCORE: 22
CLIMB 3 TO 5 STEPS WITH RAILING: A LITTLE
SUGGESTED CMS G CODE MODIFIER MOBILITY: CJ

## 2021-04-06 ASSESSMENT — ENCOUNTER SYMPTOMS
ABDOMINAL PAIN: 0
MYALGIAS: 0
SHORTNESS OF BREATH: 0
DIARRHEA: 0
HEADACHES: 0
INSOMNIA: 0
DEPRESSION: 0
NAUSEA: 0
SORE THROAT: 0
DIZZINESS: 0
COUGH: 0
HALLUCINATIONS: 0
CHILLS: 0
VOMITING: 0
CONSTIPATION: 0
NERVOUS/ANXIOUS: 0
TREMORS: 0

## 2021-04-06 ASSESSMENT — GAIT ASSESSMENTS
DISTANCE (FEET): 100
DEVIATION: DECREASED BASE OF SUPPORT
GAIT LEVEL OF ASSIST: SUPERVISED
ASSISTIVE DEVICE: 4 WHEEL WALKER

## 2021-04-06 NOTE — PROGRESS NOTES
Surgical Progress Note    Author: Shonda Quinteros M.D.      Interval Events:  OOB to chair. Feeling better today and able to answer more questions. Vocabulary and comprehension still limited but improving. Tolerating PO. Voiding to dunn. + BM.    Hemodynamics:  Temp (24hrs), Av.7 °C (98 °F), Min:36.4 °C (97.5 °F), Max:37 °C (98.6 °F)  Temperature: 36.8 °C (98.3 °F)  Pulse  Av.6  Min: 73  Max: 120   Blood Pressure : 133/73     Respiratory:    Respiration: 18, Pulse Oximetry: 99 %        RUL Breath Sounds: Diminished, RML Breath Sounds: Diminished, RLL Breath Sounds: Diminished, KAREN Breath Sounds: Diminished, LLL Breath Sounds: Diminished  Neuro:  GCS Total Ellis Coma Score: 13     Fluids:    Intake/Output Summary (Last 24 hours) at 3/31/2021 1521  Last data filed at 3/31/2021 0700  Gross per 24 hour   Intake 2180.88 ml   Output 2780 ml   Net -599.12 ml     Weight: 64 kg (141 lb 1.5 oz)  Current Diet Order   Procedures   • Diet Order Diet: Level 3 - Liquidised; Liquid level: Level 2 - Mildly Thick; Tray Modifications (optional): SLP - OK per SLP, SLP - 1:1 Supervision by Nursing     Physical Exam   HENT:   Head: Normocephalic.   Cardiovascular: Normal rate.   Pulmonary/Chest: Effort normal.   Abdominal: Soft. She exhibits no distension. There is no abdominal tenderness.   VML incision c/d/i w/ SQ suture, LLQ drain w/ minimal SS output   Genitourinary:    Genitourinary Comments: Dunn draining clear urine     Musculoskeletal:         General: Edema present.   Neurological: She is alert.     Labs:  Recent Results (from the past 24 hour(s))   CBC WITH DIFFERENTIAL    Collection Time: 21 12:53 AM   Result Value Ref Range    WBC 17.8 (H) 4.8 - 10.8 K/uL    RBC 3.15 (L) 4.20 - 5.40 M/uL    Hemoglobin 8.0 (L) 12.0 - 16.0 g/dL    Hematocrit 25.1 (L) 37.0 - 47.0 %    MCV 79.7 (L) 81.4 - 97.8 fL    MCH 25.4 (L) 27.0 - 33.0 pg    MCHC 31.9 (L) 33.6 - 35.0 g/dL    Platelet Count 401 164 - 446 K/uL    MPV 9.0  9.0 - 12.9 fL    Neutrophils-Polys 78.20 (H) 44.00 - 72.00 %    Lymphocytes 8.10 (L) 22.00 - 41.00 %    Monocytes 8.00 0.00 - 13.40 %    Eosinophils 2.10 0.00 - 6.90 %    Basophils 0.40 0.00 - 1.80 %    Immature Granulocytes 3.20 (H) 0.00 - 0.90 %    Nucleated RBC 0.40 /100 WBC    Neutrophils (Absolute) 13.89 (H) 2.00 - 7.15 K/uL    Lymphs (Absolute) 1.44 1.00 - 4.80 K/uL    Monos (Absolute) 1.42 (H) 0.00 - 0.85 K/uL    Eos (Absolute) 0.38 0.00 - 0.51 K/uL    Baso (Absolute) 0.08 0.00 - 0.12 K/uL    Immature Granulocytes (abs) 0.57 (H) 0.00 - 0.11 K/uL    NRBC (Absolute) 0.08 K/uL   Comp Metabolic Panel    Collection Time: 04/05/21 12:53 AM   Result Value Ref Range    Sodium 136 135 - 145 mmol/L    Potassium 4.0 3.6 - 5.5 mmol/L    Chloride 100 96 - 112 mmol/L    Co2 25 20 - 33 mmol/L    Anion Gap 11.0 7.0 - 16.0    Glucose 112 (H) 65 - 99 mg/dL    Bun 10 8 - 22 mg/dL    Creatinine 0.65 0.50 - 1.40 mg/dL    Calcium 8.5 8.5 - 10.5 mg/dL    AST(SGOT) 95 (H) 12 - 45 U/L    ALT(SGPT) 156 (H) 2 - 50 U/L    Alkaline Phosphatase 263 (H) 30 - 99 U/L    Total Bilirubin 0.2 0.1 - 1.5 mg/dL    Albumin 2.5 (L) 3.2 - 4.9 g/dL    Total Protein 6.4 6.0 - 8.2 g/dL    Globulin 3.9 (H) 1.9 - 3.5 g/dL    A-G Ratio 0.6 g/dL   ESTIMATED GFR    Collection Time: 04/05/21 12:53 AM   Result Value Ref Range    GFR If African American >60 >60 mL/min/1.73 m 2    GFR If Non African American >60 >60 mL/min/1.73 m 2     Medical Decision Making, by Problem:  Active Hospital Problems    Diagnosis    • Metastatic endometrial cancer (HCC) [C54.1]      Priority: High   • Constipation [K59.00]      Priority: Medium   • Iron deficiency anemia due to chronic blood loss [D50.0]      Priority: Medium   • Hypokalemia [E87.6]      Priority: Low   • Hyponatremia [E87.1]      Priority: Low   • Peripheral edema [R60.9]      Priority: Low   • Hyperlipidemia [E78.5]      Priority: Low   • Essential hypertension [I10]      Priority: Low   • Prediabetes [R73.03]       Priority: Low   • Confusion [R41.0]      Plan:  70 y/o found to have a endometrial mass now s/p MACKENZIE/BSO/PALND/PLND/omentectomy/bilateral uretrolysis/peritoneal biopsies/cystotomy repair/bilateral ureteral stent placement on 3/29/21:      1. POD #7: making slow progress d/t postoperative stroke, thickened liquid diet w/ advancement pending SLP recs, KATIUSKA drain serosang, encourage ambulation, Needs stents x6 weeks, dunn x3 weeks.   2. Expressive aphasia: L MCA infarct, cardiothrombotic event, appreciate neurology assistance, repeat CTH w/ no change 3/31, on ASA, verbalization improving, comprehension limited > pending speech eval  3. Post op pain: percocet prn  4. Anemia: acute on chronic, s/p 2 units intraop, hg 11 >> 8 today, CTM and transfuse as indicated  5. Leukocytosis: downtrending, afebrile, likely reactive  6. Transaminitis: improving, NH4 wnl, suspect d/t shock, no tylenol  7. Stage IVB uterine carcinosarcoma: surgical findings and details d/w patient and boyfriend, final pathology reveals metastatic carcinosarcoma. I had a long discussion with the patient and her partner 4/3/21 regarding diagnosis, prognosis, and potential treatment options. SO Star made aware of poor prognosis w/ advanced metastatic disease. Discussed palliative chemotherapy, regimen, and potential side effects vs. palliative care and/or Hospice pending functional status and goals of care.   8. Dysphagia: s/p SLP consult and FEES study    9. Debilitation: PT/OT  10. GI/FEN: thickened liquid diet, monitor lytes and replete PRN  11. Ppx: SCDs, ASA, lovenox  12. Dispo: continue inpatient management for postop care

## 2021-04-06 NOTE — PROGRESS NOTES
Bedside report received. POC discussed with pt; all questions answered at this time. Call light within reach, fall precautions in place. Patient alert and oriented x2, able to communicate needs. Patient observed in bed resting, initial shift assessment performed. Pt. Denied pain or discomfort. Will continue monitor.

## 2021-04-06 NOTE — CONSULTS
"PSYCHIATRIC INTAKE EVALUATION    *Reason for admission:   Anemia and large uterine mass           *Reason for consult:   \" Patient with metastatic uterine cancer and recent MCA stroke with aphasia.  We need to be able to discuss chemo and full treatment versus hospice/palliative with the patient, but we are unable to assess if she has capacity at this time\"   *Requesting Physician/APN: Mir Thakur M.D.          Legal Hold status: Not applicable    *Chief Complaint:: \" I was sick\"    *HPI (includes Psychiatric ROS):  Patient noted to have aphasia.  I also provided paper and pen to facilitate communication, which she did not use.  Patient was oriented to person, and time time, however stated that she was at a [facility].  Stated being admitted because she was sick, however was not able to elaborate further.  She was not able to state that she was diagnosed with cancer initially. I asked her if she had received a diagnosis of cancer, and she stated \" yeah, in my stomach\".  She was not able to tell me what treatment she had been offered for her cancer.  I informed patient what had been documented in her chart regarding her urine cancer and treatment options including chemotherapy.  I also explained the team's concerned about her metastasis and poor prognosis, giving her the option for hospice, palliative as well.  After providing the information, patient stated \" I did not hear anything that is wrong with me\".  I asked if patient believe that she has cancer, to which she answered \" no\".  I asked her if she would be interested in chemotherapy, and she stated \" I do not know, I probably needed it\".  She was not able to tell me what will be the benefits of receiving chemotherapy.  Patient reported having a good mood, with good sleep, appetite and energy.  Denied any SI/HI.  Denied any acute psychosis or anxiety.      *Medical Review Of Symptoms (not dx conditions):   Review of Systems   Constitutional: Negative " "for chills and malaise/fatigue.   HENT: Negative for sore throat.    Respiratory: Negative for cough and shortness of breath.    Cardiovascular: Negative for chest pain.   Gastrointestinal: Negative for abdominal pain, constipation, diarrhea, nausea and vomiting.   Musculoskeletal: Negative for myalgias.   Skin: Negative for rash.   Neurological: Negative for dizziness, tremors and headaches.        Aphasia   Psychiatric/Behavioral: Negative for depression, hallucinations and suicidal ideas. The patient is not nervous/anxious and does not have insomnia.          *Psychiatric Examination:   Vitals:   Vitals:    04/06/21 0448 04/06/21 0801 04/06/21 1117 04/06/21 1520   BP: 132/75 119/70 117/72 120/71   Pulse: (!) 107 (!) 104 (!) 104 100   Resp: 18 17 16 17   Temp: 36.7 °C (98 °F) 36.4 °C (97.5 °F) 36.3 °C (97.3 °F) 36.6 °C (97.8 °F)   TempSrc: Temporal Temporal Temporal Temporal   SpO2: 97% 97% 99% 100%   Weight:       Height:         Appearance: appears stated age, fair grooming and hygiene, calm, cooperative, good eye contact  Abnormal movements: none  Gait/posture: normal  Speech: Aphasia  Though process: Poverty of thought  Associations: no loose associations  Thought content: denies AVH, no delusions or paranoia elicited, does not appear to be responding to internal stimuli, neither is internally preoccupied.   Judgement and Insight: Poor/poor  Orientation:oriented to person, and time only  Recent and Remote Memory: Impaired  Attention Span and Concentration: intact  Language:   Fund of Knowledge: not tested   Mood and Affect:\" Good\", flat  SI/HI:denies any active or passive SI/HI              *PAST MEDICAL/PSYCH/FAMILY/SOCIAL(as reported by patient):       *medical hx:           Past Medical History:   Diagnosis Date   • Hypertension      Past Surgical History:   Procedure Laterality Date   • PB TOTAL ABDOM HYSTERECTOMY N/A 3/29/2021    Procedure: HYSTERECTOMY, TOTAL, ABDOMINAL-;  Surgeon: Shonda Quinteros, " M.D.;  Location: [facility];  Service: Gynecology Oncology   • PB REMOVAL OF OMENTUM N/A 3/29/2021    Procedure: OMENTECTOMY;  Surgeon: Shonda Quinteros M.D.;  Location: [facility];  Service: Gynecology Oncology   • SALPINGO OOPHORECTOMY Bilateral 3/29/2021    Procedure: SALPINGO-OOPHORECTOMY;  Surgeon: Shonda Quinteros M.D.;  Location: [facility];  Service: Gynecology Oncology   • NODE DISSECTION Bilateral 3/29/2021    Procedure: Paraaortic and pelvic Lymph Node dissection;  Surgeon: Shonda Quinteros M.D.;  Location: SUR[facility];  Service: Gynecology Oncology   • STENT PLACEMENT Bilateral 3/29/2021    Procedure: Bilateral Ureterolysis, Cystotomy Repair,  Bilateral Ureteral Stent placement;  Surgeon: Shonda Quinteros M.D.;  Location: [facility];  Service: Gynecology Oncology   • APPENDECTOMY     • PRIMARY C SECTION          *psychiatric hx:    Denies    *family Psych hx: Denies     *social hx:   Alcohol: Denies  Drugs: Denies      *MEDICAL HX: labs, MARS, medications, etc were reviewed. Only those findings of potential interest to psychiatry are noted below:    *Current Medical issues:   see below     *Allergies:  No Known Allergies   *Current Medications:    Current Facility-Administered Medications:   •  Special Contact Isolation, , , CONTINUOUS **AND** C Diff by PCR rflx Toxin, , , Once **AND** Pharmacy Consult Request, 1 Each, Other, PHARMACY TO DOSE, Otilia Hicks M.D.  •  oxyCODONE immediate-release (ROXICODONE) tablet 5 mg, 5 mg, Oral, Q6HRS PRN, Otilia iHcks M.D.  •  [COMPLETED] ferric gluconate complex (FERRLECIT) 125 mg in  mL IVPB, 125 mg, Intravenous, Q24HR, Stopped at 04/04/21 1814 **FOLLOWED BY** [COMPLETED] ferric gluconate complex (FERRLECIT) 125 mg in  mL IVPB, 125 mg, Intravenous, Q24HR, Stopped at 04/05/21 1825 **FOLLOWED BY** ferric gluconate complex (FERRLECIT) 250 mg in  mL IVPB, 250 mg, Intravenous, Q24HR, Josue VIVAS  HAIDER White  •  enoxaparin (LOVENOX) inj 30 mg, 30 mg, Subcutaneous, DAILY, Josue White M.D., 30 mg at 04/06/21 0444  •  aspirin EC (ECOTRIN) tablet 81 mg, 81 mg, Oral, DAILY, Josue White M.D., 81 mg at 04/06/21 0444  •  amLODIPine (NORVASC) tablet 5 mg, 5 mg, Oral, Q DAY, Jany Nuno M.D., 5 mg at 04/05/21 1724  •  haloperidol lactate (HALDOL) injection 1 mg, 1 mg, Intravenous, Q6HRS PRN, Zulema Vasquez M.D., 1 mg at 03/31/21 2206  •  Pharmacy Consult Request ...Pain Management Review 1 Each, 1 Each, Other, PHARMACY TO DOSE, Shonda Quinteros M.D.  •  ondansetron (ZOFRAN) syringe/vial injection 4 mg, 4 mg, Intravenous, Q6HRS PRN, Shonda Quinteros M.D., 4 mg at 04/01/21 1705  •  NS (BOLUS) infusion 500 mL, 500 mL, Intravenous, Once PRN **AND** Notify Provider, , , Once, Shonda Quinteros M.D.  •  prochlorperazine (COMPAZINE) injection 10 mg, 10 mg, Intravenous, Q6HRS PRN, Shonda Quinteros M.D.  •  ondansetron (ZOFRAN ODT) dispertab 4 mg, 4 mg, Oral, Q4HRS PRN, Jewel Sawyer M.D.  *ECG: personally reviewed QTc  467  *Cranial Imaging: Head CT: Subacute left MCA infarct, with loss of gray-white matter differentiation in the left frontal lobe. No hemorrhagic transformation.   EEG: Not done     *Labs:  Recent Results (from the past 72 hour(s))   CBC WITH DIFFERENTIAL    Collection Time: 04/04/21  2:57 AM   Result Value Ref Range    WBC 16.8 (H) 4.8 - 10.8 K/uL    RBC 3.12 (L) 4.20 - 5.40 M/uL    Hemoglobin 7.7 (L) 12.0 - 16.0 g/dL    Hematocrit 24.6 (L) 37.0 - 47.0 %    MCV 78.8 (L) 81.4 - 97.8 fL    MCH 24.7 (L) 27.0 - 33.0 pg    MCHC 31.3 (L) 33.6 - 35.0 g/dL    Platelet Count 339 164 - 446 K/uL    MPV 9.3 9.0 - 12.9 fL    Neutrophils-Polys 79.30 (H) 44.00 - 72.00 %    Lymphocytes 7.60 (L) 22.00 - 41.00 %    Monocytes 8.50 0.00 - 13.40 %    Eosinophils 2.40 0.00 - 6.90 %    Basophils 0.20 0.00 - 1.80 %    Immature Granulocytes 2.00 (H) 0.00 - 0.90 %    Nucleated RBC 0.20 /100 WBC     Neutrophils (Absolute) 13.27 (H) 2.00 - 7.15 K/uL    Lymphs (Absolute) 1.27 1.00 - 4.80 K/uL    Monos (Absolute) 1.42 (H) 0.00 - 0.85 K/uL    Eos (Absolute) 0.41 0.00 - 0.51 K/uL    Baso (Absolute) 0.04 0.00 - 0.12 K/uL    Immature Granulocytes (abs) 0.34 (H) 0.00 - 0.11 K/uL    NRBC (Absolute) 0.03 K/uL   Comp Metabolic Panel    Collection Time: 04/04/21  2:57 AM   Result Value Ref Range    Sodium 137 135 - 145 mmol/L    Potassium 4.1 3.6 - 5.5 mmol/L    Chloride 103 96 - 112 mmol/L    Co2 26 20 - 33 mmol/L    Anion Gap 8.0 7.0 - 16.0    Glucose 121 (H) 65 - 99 mg/dL    Bun 9 8 - 22 mg/dL    Creatinine 0.62 0.50 - 1.40 mg/dL    Calcium 8.3 (L) 8.5 - 10.5 mg/dL    AST(SGOT) 69 (H) 12 - 45 U/L    ALT(SGPT) 173 (H) 2 - 50 U/L    Alkaline Phosphatase 237 (H) 30 - 99 U/L    Total Bilirubin 0.2 0.1 - 1.5 mg/dL    Albumin 2.3 (L) 3.2 - 4.9 g/dL    Total Protein 6.1 6.0 - 8.2 g/dL    Globulin 3.8 (H) 1.9 - 3.5 g/dL    A-G Ratio 0.6 g/dL   ESTIMATED GFR    Collection Time: 04/04/21  2:57 AM   Result Value Ref Range    GFR If African American >60 >60 mL/min/1.73 m 2    GFR If Non African American >60 >60 mL/min/1.73 m 2   IRON/TOTAL IRON BIND    Collection Time: 04/04/21  2:57 AM   Result Value Ref Range    Iron 21 (L) 40 - 170 ug/dL    Total Iron Binding 174 (L) 250 - 450 ug/dL    Unsat Iron Binding 153 110 - 370 ug/dL    % Saturation 12 (L) 15 - 55 %   CBC WITH DIFFERENTIAL    Collection Time: 04/05/21 12:53 AM   Result Value Ref Range    WBC 17.8 (H) 4.8 - 10.8 K/uL    RBC 3.15 (L) 4.20 - 5.40 M/uL    Hemoglobin 8.0 (L) 12.0 - 16.0 g/dL    Hematocrit 25.1 (L) 37.0 - 47.0 %    MCV 79.7 (L) 81.4 - 97.8 fL    MCH 25.4 (L) 27.0 - 33.0 pg    MCHC 31.9 (L) 33.6 - 35.0 g/dL    Platelet Count 401 164 - 446 K/uL    MPV 9.0 9.0 - 12.9 fL    Neutrophils-Polys 78.20 (H) 44.00 - 72.00 %    Lymphocytes 8.10 (L) 22.00 - 41.00 %    Monocytes 8.00 0.00 - 13.40 %    Eosinophils 2.10 0.00 - 6.90 %    Basophils 0.40 0.00 - 1.80 %    Immature  Granulocytes 3.20 (H) 0.00 - 0.90 %    Nucleated RBC 0.40 /100 WBC    Neutrophils (Absolute) 13.89 (H) 2.00 - 7.15 K/uL    Lymphs (Absolute) 1.44 1.00 - 4.80 K/uL    Monos (Absolute) 1.42 (H) 0.00 - 0.85 K/uL    Eos (Absolute) 0.38 0.00 - 0.51 K/uL    Baso (Absolute) 0.08 0.00 - 0.12 K/uL    Immature Granulocytes (abs) 0.57 (H) 0.00 - 0.11 K/uL    NRBC (Absolute) 0.08 K/uL   Comp Metabolic Panel    Collection Time: 04/05/21 12:53 AM   Result Value Ref Range    Sodium 136 135 - 145 mmol/L    Potassium 4.0 3.6 - 5.5 mmol/L    Chloride 100 96 - 112 mmol/L    Co2 25 20 - 33 mmol/L    Anion Gap 11.0 7.0 - 16.0    Glucose 112 (H) 65 - 99 mg/dL    Bun 10 8 - 22 mg/dL    Creatinine 0.65 0.50 - 1.40 mg/dL    Calcium 8.5 8.5 - 10.5 mg/dL    AST(SGOT) 95 (H) 12 - 45 U/L    ALT(SGPT) 156 (H) 2 - 50 U/L    Alkaline Phosphatase 263 (H) 30 - 99 U/L    Total Bilirubin 0.2 0.1 - 1.5 mg/dL    Albumin 2.5 (L) 3.2 - 4.9 g/dL    Total Protein 6.4 6.0 - 8.2 g/dL    Globulin 3.9 (H) 1.9 - 3.5 g/dL    A-G Ratio 0.6 g/dL   ESTIMATED GFR    Collection Time: 04/05/21 12:53 AM   Result Value Ref Range    GFR If African American >60 >60 mL/min/1.73 m 2    GFR If Non African American >60 >60 mL/min/1.73 m 2   CBC WITH DIFFERENTIAL    Collection Time: 04/06/21  3:57 AM   Result Value Ref Range    WBC 18.6 (H) 4.8 - 10.8 K/uL    RBC 3.29 (L) 4.20 - 5.40 M/uL    Hemoglobin 8.2 (L) 12.0 - 16.0 g/dL    Hematocrit 26.1 (L) 37.0 - 47.0 %    MCV 79.3 (L) 81.4 - 97.8 fL    MCH 24.9 (L) 27.0 - 33.0 pg    MCHC 31.4 (L) 33.6 - 35.0 g/dL    Platelet Count 497 (H) 164 - 446 K/uL    MPV 9.1 9.0 - 12.9 fL    Neutrophils-Polys 80.00 (H) 44.00 - 72.00 %    Lymphocytes 8.00 (L) 22.00 - 41.00 %    Monocytes 7.00 0.00 - 13.40 %    Eosinophils 1.80 0.00 - 6.90 %    Basophils 0.30 0.00 - 1.80 %    Immature Granulocytes 2.90 (H) 0.00 - 0.90 %    Nucleated RBC 0.50 /100 WBC    Neutrophils (Absolute) 14.84 (H) 2.00 - 7.15 K/uL    Lymphs (Absolute) 1.49 1.00 - 4.80 K/uL     Monos (Absolute) 1.30 (H) 0.00 - 0.85 K/uL    Eos (Absolute) 0.34 0.00 - 0.51 K/uL    Baso (Absolute) 0.06 0.00 - 0.12 K/uL    Immature Granulocytes (abs) 0.54 (H) 0.00 - 0.11 K/uL    NRBC (Absolute) 0.10 K/uL   Comp Metabolic Panel    Collection Time: 04/06/21  3:57 AM   Result Value Ref Range    Sodium 138 135 - 145 mmol/L    Potassium 4.1 3.6 - 5.5 mmol/L    Chloride 101 96 - 112 mmol/L    Co2 25 20 - 33 mmol/L    Anion Gap 12.0 7.0 - 16.0    Glucose 125 (H) 65 - 99 mg/dL    Bun 10 8 - 22 mg/dL    Creatinine 0.69 0.50 - 1.40 mg/dL    Calcium 8.7 8.5 - 10.5 mg/dL    AST(SGOT) 82 (H) 12 - 45 U/L    ALT(SGPT) 137 (H) 2 - 50 U/L    Alkaline Phosphatase 267 (H) 30 - 99 U/L    Total Bilirubin 0.2 0.1 - 1.5 mg/dL    Albumin 2.6 (L) 3.2 - 4.9 g/dL    Total Protein 6.7 6.0 - 8.2 g/dL    Globulin 4.1 (H) 1.9 - 3.5 g/dL    A-G Ratio 0.6 g/dL   ESTIMATED GFR    Collection Time: 04/06/21  3:57 AM   Result Value Ref Range    GFR If African American >60 >60 mL/min/1.73 m 2    GFR If Non African American >60 >60 mL/min/1.73 m 2       Recent Labs     04/04/21  0257 04/05/21  0053 04/06/21  0357   WBC 16.8* 17.8* 18.6*   RBC 3.12* 3.15* 3.29*   HEMOGLOBIN 7.7* 8.0* 8.2*   HEMATOCRIT 24.6* 25.1* 26.1*   MCV 78.8* 79.7* 79.3*   MCH 24.7* 25.4* 24.9*   PLATELETCT 339 401 497*   MPV 9.3 9.0 9.1   NEUTSPOLYS 79.30* 78.20* 80.00*   LYMPHOCYTES 7.60* 8.10* 8.00*   MONOCYTES 8.50 8.00 7.00   EOSINOPHILS 2.40 2.10 1.80   BASOPHILS 0.20 0.40 0.30     Lab Results   Component Value Date/Time    SODIUM 138 04/06/2021 03:57 AM    POTASSIUM 4.1 04/06/2021 03:57 AM    CHLORIDE 101 04/06/2021 03:57 AM    CO2 25 04/06/2021 03:57 AM    GLUCOSE 125 (H) 04/06/2021 03:57 AM    BUN 10 04/06/2021 03:57 AM    CREATININE 0.69 04/06/2021 03:57 AM         No results found for: BREATHALIZER  No components found for: BLOODALCOHOL   No results found for: AMPHUR, BARBSURINE, BENZODIAZU, COCAINEMET, METHADONE, ECSTASY, OPIATES, OXYCODN, PCPURINE, PROPOXY,  CANNABINOID  No results found for: TSH, FREET4     Assessment: Patient lacks capacity to make medical decisions, specifically regarding to her cancer treatment.      Dx:  Neurocognitive disorder due to recent CVA    Medical :  Metastatic uterine carcinosarcoma stage IVB  Perioperative L MCA  Diarrhea  Tendinitis  Aggressive anemia  Leukocytosis  HLD  Hypertension      Plan:  1- Legal hold: Not applicable  2- Psychotropic medications: Not warranted  3- Patient lacks capacity to make medical decisions, specifically regarding to her cancer treatment.    4- psychiatry signing off

## 2021-04-06 NOTE — DISCHARGE PLANNING
Anticipated Discharge Disposition: TBD    Action: Patient discussed in IDT rounds.  Per MD, psych has been consulted to see if patient has capacity to appoint DPOA. Patient is not medically cleared. Per PT/OT patient may need post acute placement depending on POC.    Barriers to Discharge: medical clearance, possible placement needs    Plan: Case coordination to continue to follow for discharge planning support

## 2021-04-06 NOTE — CONSULTS
BRIEF PSYCHIATRIC CONSULT NOTE: patient seen, full note to follow.      Patient lacks capacity to make medical decisions, specifically regarding to her cancer treatment.    Psychiatry signing off.  Please reconsult if needed.

## 2021-04-06 NOTE — PROGRESS NOTES
RN SHIFT SUMMARY    Assumed care of patient this AM. Resting in bed at time of report. No complaints or apparent distress.    Assisted patient to recliner for breakfast. Patient feeding self with no apparent swallowing difficulties.    Patient ambulated to bathroom with hand held assist. Stooled in hat in toilet. Stool too solid to send to lab for CDiff rule out.    Patient ambulating with hand held, minimal assistance.     Patient back in chair.

## 2021-04-06 NOTE — PROGRESS NOTES
"[facility] [place] FAMILY MEDICINE PROGRESS NOTE     Attending: Dr. Uvaldo Diaz    Resident: Dr. Otilia Hicks    PATIENT: Graciela Desai; [id number]; 1950 Hospital Day: 13      SUBJECTIVE: Nursing reports 2 x watery BM's overnight. Stools had been loose for the past two days and bowel regimen was held since 4/3. Patient shakes her head \"no\" when asked if she has any pain, complaints or symptoms. She denies abdominal pain, head ache, CP, SOB in the same manner. Reports that she has a good appetite     OBJECTIVE:     Vitals:    04/05/21 2352 04/06/21 0448 04/06/21 0801 04/06/21 1117   BP: 125/73 132/75 119/70 117/72   Pulse: 98 (!) 107 (!) 104 (!) 104   Resp: 18 18 17 16   Temp: 36.5 °C (97.7 °F) 36.7 °C (98 °F) 36.4 °C (97.5 °F) 36.3 °C (97.3 °F)   TempSrc: Temporal Temporal Temporal Temporal   SpO2: 97% 97% 97% 99%   Weight:       Height:           Intake/Output Summary (Last 24 hours) at 4/6/2021 1251  Last data filed at 4/6/2021 1000  Gross per 24 hour   Intake 240 ml   Output 3000 ml   Net -2760 ml       PE:  General: No acute distress, resting comfortably in bed.  HEENT: NC/AT. PERRLA. EOMI. MMM  Cardiovascular: RRR with no murmurs, rubs, or gallops  Respiratory: Symmetric expansion. CTAB with no crackles or wheezing  Abdomen: Soft, minimally tender over KATIUSKA drain site and Left abdomen, non distended, no masses, bowel sounds present. KATIUSKA drain in place over LLQ  EXT:  Moves all extremities, no cyanosis, clubbing, or edema. Pulses 2+ and present in all 4  Neuro: alert and oriented to person, place, situation, expressive aphasia present and dysarthria unchanged. CN II-XII grossly intact. Strength 5/5 throughout-grossly normal  Skin: In tact without lesions, rashes or appreciable jaundice  Psych: Appropriate affect      LABS:  Recent Labs     04/04/21  0257 04/05/21  0053 04/06/21  0357   WBC 16.8* 17.8* 18.6*   RBC 3.12* 3.15* 3.29*   HEMOGLOBIN 7.7* 8.0* 8.2*   HEMATOCRIT 24.6* 25.1* 26.1*   MCV 78.8* 79.7* 79.3*   MCH 24.7* " 25.4* 24.9*   PLATELETCT 339 401 497*   MPV 9.3 9.0 9.1   NEUTSPOLYS 79.30* 78.20* 80.00*   LYMPHOCYTES 7.60* 8.10* 8.00*   MONOCYTES 8.50 8.00 7.00   EOSINOPHILS 2.40 2.10 1.80   BASOPHILS 0.20 0.40 0.30     Recent Labs     04/04/21 0257 04/05/21 0053 04/06/21  0357   SODIUM 137 136 138   POTASSIUM 4.1 4.0 4.1   CHLORIDE 103 100 101   CO2 26 25 25   BUN 9 10 10   CREATININE 0.62 0.65 0.69   CALCIUM 8.3* 8.5 8.7   ALBUMIN 2.3* 2.5* 2.6*     Estimated GFR/CRCL = Estimated Creatinine Clearance: 64.3 mL/min (by C-G formula based on SCr of 0.69 mg/dL).  Recent Labs     04/04/21 0257 04/05/21 0053 04/06/21  0357   GLUCOSE 121* 112* 125*     Recent Labs     04/04/21 0257 04/05/21 0053 04/06/21  0357   ASTSGOT 69* 95* 82*   ALTSGPT 173* 156* 137*   TBILIRUBIN 0.2 0.2 0.2   ALKPHOSPHAT 237* 263* 267*   GLOBULIN 3.8* 3.9* 4.1*             No results for input(s): INR, APTT, FIBRINOGEN in the last 72 hours.    Invalid input(s): DIMER    MICROBIOLOGY: C. Diff pending    IMAGING: No new imaging    MEDS:  Current Facility-Administered Medications   Medication Last Admin   • Pharmacy Consult Request     • oxyCODONE immediate-release (ROXICODONE) tablet 5 mg     • ferric gluconate complex (FERRLECIT) 250 mg in  mL IVPB     • enoxaparin (LOVENOX) inj 30 mg 30 mg at 04/06/21 0444   • aspirin EC (ECOTRIN) tablet 81 mg 81 mg at 04/06/21 0444   • amLODIPine (NORVASC) tablet 5 mg 5 mg at 04/05/21 1724   • haloperidol lactate (HALDOL) injection 1 mg 1 mg at 03/31/21 2206   • Pharmacy Consult Request ...Pain Management Review 1 Each     • ondansetron (ZOFRAN) syringe/vial injection 4 mg 4 mg at 04/01/21 1705   • NS (BOLUS) infusion 500 mL     • prochlorperazine (COMPAZINE) injection 10 mg     • ondansetron (ZOFRAN ODT) dispertab 4 mg         PROBLEM LIST:  No problems updated.    ASSESSMENT/PLAN: Ms. 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      Now POD #8 s/p abd hysterectomy, bilat oopherectomy salpingectomy, pelvic and chintan-aortic lymph node dissections, omentectomy, bilateral uretrolysis, peritoneal biopsies, cystotomy repair and bilateral ureteral stent placement on 3/29/21     Course complicated by perioperative L MCA infarct with primary deficit of receptive and expressive aphasia.     # Metastatic uterine carcinosarcoma: Stage IVB  Gyn onc Dr. Carey and Shonda Quinteros following. Appreciate recommendations:               -Ureteral stents x 6 weeks              -Beverly x 3 weeks              -OOB to chair               -PT for post op mobility              -s/p discussion regarding prognosis, treatment options and SE, and expected disease course. Awaiting cognitive evaluation by psychiatry prior to decision making for goals of care discussions with palliative              -Palliative following, appreciate recommendations.      # Perioperative L MCA  Suspected cardioembolic per neurology, c/w CT findings. Repeat CT with no evidence of hemorrhagic conversion. Echo showing mild dilation of LA and RVSP 29 mm, EF 60%              -Continued PT/OT/SLP              -Continue telemetry monitoring              -FEES pending              -Continue ASA              -Anticipate Postacute rehab for Speech therapy              -f/u palliative recommendations   -Psych consult for cognitive evaluation to determine decision making capacity for choices of therapy going forward.     # Diarrhea  2 episodes watery diarrhea, recent antibiotic exposure  c diff pending with precautions in place and bowel regimen held    # Transaminitis   Likely secondary to shock. Downtrending from 800-900's to 82 AST and 137 ALT today.   Daily CMP   Avoid hepatotoxic medications: tylenol, statin  Changed percocet to oxycodone 5. Has not needed since 4/3     # Anemia, microcytic  Multifactorial, primarily blood loss and ACD with SARAH, hgb 8.2 today  IV iron   Transfuse for Hgb  <7  Daily CBC     # Leukocytosis  Most likely secondary to steroid taper for brain metastasis  If fevers, low threshold for initiating empiric ABX and pan culture     # HLD  # HTN  Holding statin  Continue amlodipine     Lines: PIV, Beverly, KATIUSKA drain  IVF: none  Abx: None  GI PPx:Multimodal  DVT PPx: SCD's, lovenox  Code: Full  Diet: thick liquids     Dispo: Inpatient for further determination of needs and postoperative recovery    Otilia Hicks MD  PGY-1 Family Medicine Resident  [facility] [place]

## 2021-04-06 NOTE — NON-PROVIDER
"  [facility] [place] FAMILY MEDICINE PROGRESS NOTE      Attending:   Dr. Diaz      Resident:   Mir Thakur MD     Medical Student:  Tania Olguin, MS3     PATIENT:   Graciela Desai; [id number]; 1950     SUBJECTIVE:   Patient is more quiet this morning and answers questions primarily by shaking her head \"yes\" or \"no.\" She reports sleeping well overnight and denies any pain this morning. Patient struggles with word-finding during the interview,however expresses that she knows something is \"wrong with her brain.\". Reports having a normal bowel movements, though her nurse says the most recent movements have been watery and grey. Patient denies fever or chills, anorexia, abdominal pain, chest pain, SOB, or nausea/vomiting.      OBJECTIVE:  Vitals:    04/05/21 2003 04/05/21 2352 04/06/21 0448 04/06/21 0801   BP: 133/73 125/73 132/75 119/70   Pulse: (!) 103 98 (!) 107 (!) 104   Resp: 18 18 18 17   Temp: 36.8 °C (98.3 °F) 36.5 °C (97.7 °F) 36.7 °C (98 °F) 36.4 °C (97.5 °F)   TempSrc: Temporal Temporal Temporal Temporal   SpO2: 96% 97% 97% 97%   Weight: 64 kg (141 lb 1.5 oz)      Height:           Intake/Output Summary (Last 24 hours) at 4/6/2021 1040  Last data filed at 4/6/2021 1000  Gross per 24 hour   Intake 240 ml   Output 3000 ml   Net -2760 ml              PHYSICAL EXAM:   General: Thin, quiet woman in no acute distress, afebrile, resting comfortably in bed  HEENT: NC/AT. EOMI.   Cardiovascular: RRR without murmurs, rubs, heaves. Normal capillary refill   Respiratory: CTAB, no tachypnea or retractions   Abdomen: midline surgical incision is cool, dry, and intact; abdomen is soft, nontender, nondistended, no masses  EXT:  no edema  Skin: No erythema/lesions; PIV in left and right upper extremity; each PIV is free of discharge, no erythema, no edema  Neuro: CN II-XII grossly intact, no focal muscle weakness        LABS:  Recent Labs     04/04/21  0257 04/05/21  0053 04/06/21  0357   WBC 16.8* 17.8* 18.6*   RBC 3.12* 3.15* " 3.29*   HEMOGLOBIN 7.7* 8.0* 8.2*   HEMATOCRIT 24.6* 25.1* 26.1*   MCV 78.8* 79.7* 79.3*   MCH 24.7* 25.4* 24.9*   PLATELETCT 339 401 497*   MPV 9.3 9.0 9.1   NEUTSPOLYS 79.30* 78.20* 80.00*   LYMPHOCYTES 7.60* 8.10* 8.00*   MONOCYTES 8.50 8.00 7.00   EOSINOPHILS 2.40 2.10 1.80   BASOPHILS 0.20 0.40 0.30       Recent Labs     04/04/21  0257 04/05/21  0053 04/06/21  0357   SODIUM 137 136 138   POTASSIUM 4.1 4.0 4.1   CHLORIDE 103 100 101   CO2 26 25 25   GLUCOSE 121* 112* 125*   BUN 9 10 10       Recent Labs     04/04/21 0257 04/05/21  0053 04/06/21  0357   ASTSGOT 69* 95* 82*   ALTSGPT 173* 156* 137*   TBILIRUBIN 0.2 0.2 0.2   ALKPHOSPHAT 237* 263* 267*   GLOBULIN 3.8* 3.9* 4.1*             IMAGING:   EC-ECHOCARDIOGRAM COMPLETE W/ CONT   Final Result       CT-HEAD W/O   Final Result       Subacute left MCA infarct, with loss of gray-white matter differentiation in the left frontal lobe. No hemorrhagic transformation.       MR-BRAIN-WITH & W/O   Final Result   Addendum 1 of 1   Also noted in the left frontal calvarium is an area of enhancement in the    diploic space measuring approximately 11 mm which corresponds to the    location concerning for lytic lesion on the recent CT of the head. Finding    is concerning for metastatic osseous    disease.       Final       1.  Moderate-sized left middle cerebral artery acute infarct with possible early hemorrhagic transformation.   2.  Scattered punctate and small acute infarcts involving the bilateral parieto-occipital regions and left cerebellar hemisphere.   3.  There is a pattern of mild supratentorial white matter disease with scattered foci of bright T2 and FLAIR signal in the subcortical and deep white matter of both hemispheres consistent with small vessel ischemic change versus demyelination or    gliosis. Mild diffuse cerebral substance loss.   4.  Mild microangiopathic ischemic change versus demyelination or gliosis.       Attempts to convey these findings to  Dr. HERNANDEZ PALOMO were initiated on 3/31/2021 10:44 AM. Findings were discussed with Dr. Palomo on 3/31/2021 at 1056 hours.       CT-CEREBRAL PERFUSION ANALYSIS   Final Result       1.  Normal study. Cerebral blood flow less than 30% likely representing completed infarct = 0 mL.       2.  T Max more than 6 seconds likely representing combination of completed infarct and ischemia = 0 mL.       3.  Mismatched volume likely representing ischemic brain/penumbra = None       4.  Please note that the cerebral perfusion was performed on the limited brain tissue around the basal ganglia region. Infarct/ischemia outside the CT perfusion sections can be missed in this study.       CT-CTA NECK WITH & W/O-POST PROCESSING   Final Result       Beaded appearance of the cervical carotid and vertebral arteries most suggestive of fibromuscular dysplasia.       No significant stenosis or dissection.       Lytic lesion in the C7 vertebral body and the left frontal calvarium. Malignancy is concerning and must be excluded.       CT-CTA HEAD WITH & W/O-POST PROCESS   Final Result       Acute/subacute left MCA infarct.       No large vessel occlusion or hemodynamically significant stenosis.       Small lytic lesion in the left frontal calvaria concerning for malignancy.       PE-PCSQTRW-4 VIEW   Final Result           1.  Nonspecific bowel gas pattern.       US-EXTREMITY VENOUS LOWER BILAT   Final Result       MR-PELVIS-WITH & W/O AND SEQUENCES   Final Result           1. Stage IV endometrial cancer. The endometrial mass itself measures 13 cm, invading through the anterior uterine wall and uterine fundus.   2. Metastatic parametrial, external iliac and para-aortic lymphadenopathy.   3. Multiple pelvic osseous metastases.   4. Several uterine fibroids.               US-PELVIC TRANSABDOMINAL ONLY   Final Result       Enlarged, heterogeneous uterus could indicate underlying fibroids though malignancy cannot be excluded.          Endometrial stripe not visualized.       CT-CTA COMPLETE THORACOABDOMINAL AORTA   Final Result       No evidence of aortic aneurysm or dissection.       Partial visualization of a markedly enlarged heterogeneous uterus with hypervascularity concerning for malignancy versus fibroids.       Diverticulosis without evidence of diverticulitis.       Bilateral hydronephrosis and hydroureter likely related to enlarged uterus.       Nonobstructive left renal stone.       Bilateral pulmonary nodules measuring up to 5 mm.       Nodule recommendations:   Low Risk: No routine follow-up       High Risk: Optional CT at 12 months       Comments: Use most suspicious nodule as guide to management. Follow-up intervals may vary according to size and risk.       Low Risk - Minimal or absent history of smoking and of other known risk factors.       High Risk - History of smoking or of other known risk factors.       Note: These recommendations do not apply to lung cancer screening, patients with immunosuppression, or patients with known primary cancer.       Fleischner Society 2017 Guidelines for Management of Incidentally Detected Pulmonary Nodules in Adults       DX-CHEST-PORTABLE (1 VIEW)   Final Result       No acute cardiac or pulmonary abnormalities are identified.             CULTURES:                                 Results      Procedure Component Value Units Date/Time     URINE CULTURE(NEW) [[id number]] Collected: 03/27/21 1329     Order Status: Completed Specimen: Urine, Clean Catch Updated: 03/29/21 0832       Significant Indicator NEG       Source UR       Site URINE, CLEAN CATCH       Culture Result Usual urogenital marisa 10-50,000 cfu/mL     Narrative:       Indication for culture:->Patient WITHOUT an indwelling Beverly  catheter in place with new onset of Dysuria, Frequency,  Urgency, and/or Suprapubic pain  Indication for culture:->Patient WITHOUT an indwelling Beverly             MEDS:  Scheduled Medications   Medication  Dose Frequency   • Pharmacy  1 Each PHARMACY TO DOSE   • ferric gluconate (FERRLECIT) IV  250 mg Q24HR   • enoxaparin (LOVENOX) injection  30 mg DAILY   • aspirin EC  81 mg DAILY   • amLODIPine  5 mg Q DAY   • Pharmacy Consult Request  1 Each PHARMACY TO DOSE          ASSESSMENT/PLAN:   Graciela is a 71 y.o. female admitted for chest and back pain on 3/26/21 attributed to uterine mass discovered on imaging, now 8 days status post total abdominal hysterectomy with salpingo-oophorectomy, periaortic and pelvic lymph node dissection, omentectomy, bilateral ureterolysis with peritoneal biopsies, cystoscopy repair and bilateral ureteral stent placement. 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      #Metastatic uterine cancer, adenocarcinoma  S/p total abdominal hysterectomy, oophorectomy, omentecomy 3/29/2021; POD # 8.  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 Quinteros, patient and SO, Star, seem interested in continued discussions of goals of care  -Palliative consult on 4/2/21 recommended cognitive evaluation with SLP  -per note, SLP unable to perform cognitive evaluation due to severity of aphasia, deferred to psych  -Psych consult placed for cognitive evaluation  -swallow precautions in place per SLP, liquidized solids okay  -Awaiting patient visit with oncologist     #Acute left MCA infarct  Development of expressive aphasia with confusion on 3/30/2021, subsequent findings of acute infarction upon imaging.  Per neurology, pattern of infarct suspicious for cardioembolic phenomenon (likely related to hypercoagulability due to underlying cancer)  -Repeat CT head without contrast ruled out hemorrhagic conversion  -PT/OT/speech ordered  -FEES pending per speech  -Echocardiogram ordered for possible cardioembolic origin of MRI brain findings; echo has now returned with EF 60%; mildly dilated LA; RVSP 29 mm  Hg   -Overall prognosis is poor and would like to involve palliative care  -SLP recommends postacute rehab for speech therapy  -Diet advanced 4/2/21 per SLP; recommendations appreciated  -Aspirin started 4/2/21     #Leukocytosis with bandemia  -WBC of 18.6 today, up from 17.8 yesterday  -left-shift noted on differential  -Pulse 107 and /75 this AM  -continue to monitor for signs of fever, has been afebrile with temp 98 F this AM  -due to nurse's report of watery diarrhea, will test patient's stool for C. difficile toxin  -UA pending to reevaluate possible urinary infection  -monitor closely to assess possible need for antibiotics      #Acute transaminitis  Patient with acute rise in transaminases, suspected to be due to shock liver  -Ammonia level normal  -Holding statin, Tylenol; hold all other hepatotoxins  -Hold Percocet  -The downward trend is continuing today, with AST and ALT 82 and 137, respectively  -Continue to monitor     #Iron deficiency anemia due to chronic blood loss  -Hemoglobin this AM is 8.2  -Aspirin and lovenox were started 4/2  -Due to low MCV and chronic blood loss, started IV Iron per pharmacy 4/4  -Continue to monitor, transfuse as indicated for hemoglobin less than 7     #Hypokalemia  Continue to monitor, replace as indicated  -K this AM is 4.1, up from 3.4 on admission     #Hyperlipidemia  -Holding statin as above     #Essential hypertension  -Holding home amlodipine     #DVT Prophylaxis  -Per gynecology, OK to start lovenox DVT ppx once hemoglobin has stabilized  -Lovenox started 4/2/21     Core Measures:  DVT PPX: Lovenox, SCDs  ABX: None  Lines: PIV in b/l upper extremities  Fluids: None  PCP: Shan Tuttle MD  CODE STATUS: Full     Dispo: Inpatient

## 2021-04-07 LAB
ALBUMIN SERPL BCP-MCNC: 2.5 G/DL (ref 3.2–4.9)
ALBUMIN/GLOB SERPL: 0.7 G/DL
ALP SERPL-CCNC: 265 U/L (ref 30–99)
ALT SERPL-CCNC: 126 U/L (ref 2–50)
AMORPH CRY #/AREA URNS HPF: PRESENT /HPF
ANION GAP SERPL CALC-SCNC: 11 MMOL/L (ref 7–16)
ANISOCYTOSIS BLD QL SMEAR: ABNORMAL
APPEARANCE UR: ABNORMAL
AST SERPL-CCNC: 81 U/L (ref 12–45)
BACTERIA #/AREA URNS HPF: ABNORMAL /HPF
BASOPHILS # BLD AUTO: 0.5 % (ref 0–1.8)
BASOPHILS # BLD: 0.1 K/UL (ref 0–0.12)
BILIRUB SERPL-MCNC: 0.2 MG/DL (ref 0.1–1.5)
BILIRUB UR QL STRIP.AUTO: NEGATIVE
BUN SERPL-MCNC: 12 MG/DL (ref 8–22)
CALCIUM SERPL-MCNC: 8.6 MG/DL (ref 8.5–10.5)
CHLORIDE SERPL-SCNC: 100 MMOL/L (ref 96–112)
CO2 SERPL-SCNC: 26 MMOL/L (ref 20–33)
COLOR UR: YELLOW
COMMENT 1642: NORMAL
CREAT SERPL-MCNC: 0.77 MG/DL (ref 0.5–1.4)
EOSINOPHIL # BLD AUTO: 0.28 K/UL (ref 0–0.51)
EOSINOPHIL NFR BLD: 1.4 % (ref 0–6.9)
EPI CELLS #/AREA URNS HPF: ABNORMAL /HPF
GLOBULIN SER CALC-MCNC: 3.8 G/DL (ref 1.9–3.5)
GLUCOSE SERPL-MCNC: 123 MG/DL (ref 65–99)
GLUCOSE UR STRIP.AUTO-MCNC: NEGATIVE MG/DL
HCT VFR BLD AUTO: 24.9 % (ref 37–47)
HGB BLD-MCNC: 7.9 G/DL (ref 12–16)
HYPOCHROMIA BLD QL SMEAR: ABNORMAL
IMM GRANULOCYTES # BLD AUTO: 0.52 K/UL (ref 0–0.11)
IMM GRANULOCYTES NFR BLD AUTO: 2.6 % (ref 0–0.9)
KETONES UR STRIP.AUTO-MCNC: NEGATIVE MG/DL
LEUKOCYTE ESTERASE UR QL STRIP.AUTO: ABNORMAL
LYMPHOCYTES # BLD AUTO: 1.68 K/UL (ref 1–4.8)
LYMPHOCYTES NFR BLD: 8.3 % (ref 22–41)
MACROCYTES BLD QL SMEAR: ABNORMAL
MCH RBC QN AUTO: 24.9 PG (ref 27–33)
MCHC RBC AUTO-ENTMCNC: 31.7 G/DL (ref 33.6–35)
MCV RBC AUTO: 78.5 FL (ref 81.4–97.8)
MICRO URNS: ABNORMAL
MICROCYTES BLD QL SMEAR: ABNORMAL
MONOCYTES # BLD AUTO: 1.13 K/UL (ref 0–0.85)
MONOCYTES NFR BLD AUTO: 5.6 % (ref 0–13.4)
MORPHOLOGY BLD-IMP: NORMAL
NEUTROPHILS # BLD AUTO: 16.59 K/UL (ref 2–7.15)
NEUTROPHILS NFR BLD: 81.6 % (ref 44–72)
NITRITE UR QL STRIP.AUTO: NEGATIVE
NRBC # BLD AUTO: 0.07 K/UL
NRBC BLD-RTO: 0.3 /100 WBC
PH UR STRIP.AUTO: 6 [PH] (ref 5–8)
PLATELET # BLD AUTO: 495 K/UL (ref 164–446)
PLATELET BLD QL SMEAR: NORMAL
PMV BLD AUTO: 8.6 FL (ref 9–12.9)
POLYCHROMASIA BLD QL SMEAR: NORMAL
POTASSIUM SERPL-SCNC: 4.7 MMOL/L (ref 3.6–5.5)
PROT SERPL-MCNC: 6.3 G/DL (ref 6–8.2)
PROT UR QL STRIP: 30 MG/DL
RBC # BLD AUTO: 3.17 M/UL (ref 4.2–5.4)
RBC # URNS HPF: ABNORMAL /HPF
RBC BLD AUTO: PRESENT
RBC UR QL AUTO: ABNORMAL
SODIUM SERPL-SCNC: 137 MMOL/L (ref 135–145)
SP GR UR STRIP.AUTO: 1.01
TARGETS BLD QL SMEAR: NORMAL
TRANS CELLS #/AREA URNS HPF: ABNORMAL /HPF
UROBILINOGEN UR STRIP.AUTO-MCNC: 0.2 MG/DL
WBC # BLD AUTO: 20.3 K/UL (ref 4.8–10.8)
WBC #/AREA URNS HPF: ABNORMAL /HPF

## 2021-04-07 PROCEDURE — 700102 HCHG RX REV CODE 250 W/ 637 OVERRIDE(OP): Performed by: STUDENT IN AN ORGANIZED HEALTH CARE EDUCATION/TRAINING PROGRAM

## 2021-04-07 PROCEDURE — 81001 URINALYSIS AUTO W/SCOPE: CPT

## 2021-04-07 PROCEDURE — A9270 NON-COVERED ITEM OR SERVICE: HCPCS | Performed by: STUDENT IN AN ORGANIZED HEALTH CARE EDUCATION/TRAINING PROGRAM

## 2021-04-07 PROCEDURE — 80053 COMPREHEN METABOLIC PANEL: CPT

## 2021-04-07 PROCEDURE — 97530 THERAPEUTIC ACTIVITIES: CPT

## 2021-04-07 PROCEDURE — 85025 COMPLETE CBC W/AUTO DIFF WBC: CPT

## 2021-04-07 PROCEDURE — 700105 HCHG RX REV CODE 258: Performed by: STUDENT IN AN ORGANIZED HEALTH CARE EDUCATION/TRAINING PROGRAM

## 2021-04-07 PROCEDURE — 97112 NEUROMUSCULAR REEDUCATION: CPT

## 2021-04-07 PROCEDURE — 97116 GAIT TRAINING THERAPY: CPT

## 2021-04-07 PROCEDURE — 87086 URINE CULTURE/COLONY COUNT: CPT

## 2021-04-07 PROCEDURE — 700111 HCHG RX REV CODE 636 W/ 250 OVERRIDE (IP): Performed by: STUDENT IN AN ORGANIZED HEALTH CARE EDUCATION/TRAINING PROGRAM

## 2021-04-07 PROCEDURE — 36415 COLL VENOUS BLD VENIPUNCTURE: CPT

## 2021-04-07 PROCEDURE — 770020 HCHG ROOM/CARE - TELE (206)

## 2021-04-07 PROCEDURE — 770006 HCHG ROOM/CARE - MED/SURG/GYN SEMI*

## 2021-04-07 RX ADMIN — CEFTRIAXONE SODIUM 2 G: 2 INJECTION, POWDER, FOR SOLUTION INTRAMUSCULAR; INTRAVENOUS at 10:25

## 2021-04-07 RX ADMIN — SODIUM CHLORIDE 250 MG: 9 INJECTION, SOLUTION INTRAVENOUS at 16:50

## 2021-04-07 RX ADMIN — AMLODIPINE BESYLATE 5 MG: 5 TABLET ORAL at 16:54

## 2021-04-07 RX ADMIN — OXYCODONE 5 MG: 5 TABLET ORAL at 00:53

## 2021-04-07 RX ADMIN — ENOXAPARIN SODIUM 30 MG: 30 INJECTION SUBCUTANEOUS at 04:37

## 2021-04-07 RX ADMIN — ASPIRIN 81 MG: 81 TABLET, COATED ORAL at 04:37

## 2021-04-07 ASSESSMENT — COGNITIVE AND FUNCTIONAL STATUS - GENERAL
DRESSING REGULAR UPPER BODY CLOTHING: A LITTLE
TOILETING: A LITTLE
WALKING IN HOSPITAL ROOM: A LITTLE
MOBILITY SCORE: 21
SUGGESTED CMS G CODE MODIFIER MOBILITY: CJ
HELP NEEDED FOR BATHING: A LITTLE
DAILY ACTIVITIY SCORE: 20
CLIMB 3 TO 5 STEPS WITH RAILING: A LOT
SUGGESTED CMS G CODE MODIFIER DAILY ACTIVITY: CJ
DRESSING REGULAR LOWER BODY CLOTHING: A LITTLE

## 2021-04-07 ASSESSMENT — GAIT ASSESSMENTS
GAIT LEVEL OF ASSIST: SUPERVISED
DEVIATION: DECREASED HEEL STRIKE;DECREASED TOE OFF
ASSISTIVE DEVICE: FRONT WHEEL WALKER
DISTANCE (FEET): 200

## 2021-04-07 ASSESSMENT — FIBROSIS 4 INDEX: FIB4 SCORE: 1.01

## 2021-04-07 ASSESSMENT — PAIN DESCRIPTION - PAIN TYPE: TYPE: ACUTE PAIN

## 2021-04-07 NOTE — PROGRESS NOTES
RN SHIFT SUMMARY    Assumed care of patient this AM. Patient requesting to sit in chair. Assisted patient to restroom and provided full sponge bath. Patient sat up in chair for breakfast.    Patient ambulating in halls with PT.

## 2021-04-07 NOTE — PROGRESS NOTES
Gynecology Oncology Progress Note               Author: Diann Justice P.A.-C. Date & Time created: 4/6/2021  6:02 PM     Interval History:  Up and walking with PT/OT today. Abdominal pain today, controlled with medication.  Drowsy after medication.       Physical Exam:  Physical Exam  HENT:      Head: Normocephalic and atraumatic.      Mouth/Throat:      Mouth: Mucous membranes are moist.   Cardiovascular:      Rate and Rhythm: Tachycardia present.      Pulses: Normal pulses.      Heart sounds: Normal heart sounds.   Pulmonary:      Effort: Pulmonary effort is normal.      Breath sounds: Normal breath sounds.   Abdominal:      General: Abdomen is flat. There is no distension.      Palpations: Abdomen is soft.      Tenderness: There is no abdominal tenderness.      Comments: midline incision c/d/i, LLQ drain w/ minimal serous output    Musculoskeletal:      Right lower leg: Edema present.      Left lower leg: Edema present.   Skin:     General: Skin is warm and dry.         Labs:          Recent Labs     04/04/21 0257 04/05/21 0053 04/06/21 0357   SODIUM 137 136 138   POTASSIUM 4.1 4.0 4.1   CHLORIDE 103 100 101   CO2 26 25 25   BUN 9 10 10   CREATININE 0.62 0.65 0.69   CALCIUM 8.3* 8.5 8.7     Recent Labs     04/04/21 0257 04/05/21 0053 04/06/21 0357   ALTSGPT 173* 156* 137*   ASTSGOT 69* 95* 82*   ALKPHOSPHAT 237* 263* 267*   TBILIRUBIN 0.2 0.2 0.2   GLUCOSE 121* 112* 125*     Recent Labs     04/04/21 0257 04/05/21 0053 04/06/21 0357   RBC 3.12* 3.15* 3.29*   HEMOGLOBIN 7.7* 8.0* 8.2*   HEMATOCRIT 24.6* 25.1* 26.1*   PLATELETCT 339 401 497*   IRON 21*  --   --    TOTIRONBC 174*  --   --      Recent Labs     04/04/21 0257 04/05/21 0053 04/06/21 0357   WBC 16.8* 17.8* 18.6*   NEUTSPOLYS 79.30* 78.20* 80.00*   LYMPHOCYTES 7.60* 8.10* 8.00*   MONOCYTES 8.50 8.00 7.00   EOSINOPHILS 2.40 2.10 1.80   BASOPHILS 0.20 0.40 0.30   ASTSGOT 69* 95* 82*   ALTSGPT 173* 156* 137*   ALKPHOSPHAT 237* 263* 267*    TBILIRUBIN 0.2 0.2 0.2     Recent Labs     21  0257 21  0053 21  0357   SODIUM 137 136 138   POTASSIUM 4.1 4.0 4.1   CHLORIDE 103 100 101   CO2 26 25 25   GLUCOSE 121* 112* 125*   BUN 9 10 10   CREATININE 0.62 0.65 0.69   CALCIUM 8.3* 8.5 8.7     Hemodynamics:  Temp (24hrs), Av.6 °C (97.8 °F), Min:36.3 °C (97.3 °F), Max:36.8 °C (98.3 °F)  Temperature: 36.6 °C (97.8 °F)  Pulse  Av.9  Min: 73  Max: 120   Blood Pressure : 120/71     Respiratory:    Respiration: 17, Pulse Oximetry: 100 %        RUL Breath Sounds: Diminished, RML Breath Sounds: Diminished, RLL Breath Sounds: Diminished, KAREN Breath Sounds: Diminished, LLL Breath Sounds: Diminished  Fluids:    Intake/Output Summary (Last 24 hours) at 2021 1802  Last data filed at 2021 1738  Gross per 24 hour   Intake 480 ml   Output 3800 ml   Net -3320 ml     Weight: 64 kg (141 lb 1.5 oz)  GI/Nutrition:  Orders Placed This Encounter   Procedures   • Diet Order Diet: Level 3 - Liquidised; Liquid level: Level 2 - Mildly Thick; Tray Modifications (optional): SLP - OK per SLP, SLP - 1:1 Supervision by Nursing     Standing Status:   Standing     Number of Occurrences:   1     Order Specific Question:   Diet:     Answer:   Level 3 - Liquidised [26]     Order Specific Question:   Liquid level     Answer:   Level 2 - Mildly Thick     Order Specific Question:   Tray Modifications (optional)     Answer:   SLP - OK per SLP     Order Specific Question:   Tray Modifications (optional)     Answer:   SLP - 1:1 Supervision by Nursing     Medical Decision Making, by Problem:  Active Hospital Problems    Diagnosis    • *Metastatic endometrial cancer (HCC) [C54.1]    • Constipation [K59.00]    • Iron deficiency anemia due to chronic blood loss [D50.0]    • Hypokalemia [E87.6]    • Hyponatremia [E87.1]    • Peripheral edema [R60.9]    • Hyperlipidemia [E78.5]    • Essential hypertension [I10]    • Prediabetes [R73.03]    • Confusion [R41.0]         Plan:  70 y/o found to have a endometrial mass now s/p MACKENZIE/BSO/PALND/PLND/omentectomy/bilateral uretrolysis/peritoneal biopsies/cystotomy repair/bilateral ureteral stent placement on 3/29/21:      1. POD #8: making slow progress d/t postoperative stroke, thickened liquid diet w/ advancement pending SLP recs, KATIUSKA drain with minimal serous, +ROBF, encourage ambulation, Needs stents x6 weeks, dunn x3 weeks.   2. Expressive aphasia: L MCA infarct, cardiothrombotic event, appreciate neurology assistance, repeat CTH w/ no change 3/31, on ASA, verbalization improving, comprehension limited > pending speech eval  3. Post op pain: percocet prn  4. Anemia: acute on chronic, s/p 2 units intraop, hg 11 >> 8 today, CTM and transfuse as indicated  5. Leukocytosis: increased today to 18.6, remains afebrile, likely reactive, will CTM. Patient had several loose bowel movements, C. Diff ordered.   6. Transaminitis: improving, NH4 wnl, suspect d/t shock, no tylenol  7. Stage IVB uterine carcinosarcoma: surgical findings and details d/w patient and boyfriend, final pathology reveals metastatic carcinosarcoma. Dr. Quinteros discussed with the patient and her partner on 4/3/21 regarding diagnosis, prognosis, and potential treatment options. SO Star made aware of poor prognosis w/ advanced metastatic disease. Discussed palliative chemotherapy, regimen, and potential side effects vs. palliative care and/or Hospice pending functional status and goals of care. Psychiatric consult done today, states patient lacks capacity to make medical decisions, specifically regarding her cancer treatment.   8. Dysphagia: s/p SLP consult and FEES study    9. Debilitation: PT/OT  10. GI/FEN: thickened liquid diet, monitor lytes and replete PRN  11. Ppx: SCDs, ASA, lovenox  12. Dispo: continue inpatient management for postop care     Quality-Core Measures

## 2021-04-07 NOTE — THERAPY
Physical Therapy   Daily Treatment     Patient Name: Graciela Desai  Age:  71 y.o., Sex:  female  Medical Record #: [id number]  Today's Date: 4/7/2021     Precautions: Fall Risk    Assessment    PT has met 3 out of 4 STG for acute stay. Pt progressing well with functional mobility, Pt was able to safely ascend and descend steps x 3 with rail. Pt requesting a FWW for home use. Recommend home health PT services upon discharge     Plan    Continue current treatment plan.     DC Equipment Recommendations: Front-Wheel Walker  Discharge Recommendations: Recommend home health for continued physical therapy services         Objective       04/07/21 1401   Neuro-Muscular Treatments   Neuro-Muscular Treatments Weight Shift Left;Weight Shift Right;Postural Changes   Comments static stand w/o UE support, functional reaching LUE/RUE x 6 reps.    Balance   Sitting Balance (Static) Good   Sitting Balance (Dynamic) Good   Standing Balance (Static) Fair   Standing Balance (Dynamic) Fair   Weight Shift Sitting Fair   Weight Shift Standing Fair   Skilled Intervention Sequencing;Verbal Cuing   Gait Analysis   Gait Level Of Assist Supervised   Assistive Device Front Wheel Walker   Distance (Feet) 200   # of Times Distance was Traveled 1   Deviation Decreased Heel Strike;Decreased Toe Off  (heavy reliance in FWW for balance )   # of Stairs Climbed 3  (bilateral rails)   Level of Assist with Stairs Supervised   Skilled Intervention Sequencing;Verbal Cuing   Functional Mobility   Sit to Stand Supervised   Bed, Chair, Wheelchair Transfer Supervised   Toilet Transfers Supervised   Transfer Method Stand Step  (w/ FWW)   Skilled Intervention Verbal Cuing;Tactile Cuing;Sequencing   How much help from another person does the patient currently need...   6 clicks Mobility Score 21   Short Term Goals    Short Term Goal # 1 Pt will be transfer with LRAD with SPV from bed to chair by the 6th PT visit   Goal Outcome # 1 Goal met   Short Term Goal # 2  Pt will ambulate w/ LRAD for 150 ft with SPV by the 6th tx   Goal Outcome # 2 Goal met   Short Term Goal # 3 PT will ascend and descend steps x 2 with SPV by the 6th tx   Goal Outcome # 3 Goal met   Short Term Goal # 4 Pt will maintain standing balance w/single UE support for 3 min for ADL's by the 6th tx   Goal Outcome # 4 Progressing as expected   Education Group   Role of Physical Therapist Patient Response Patient;Family;Acceptance;Explanation;Verbal Demonstration   Gait Training Patient Response Patient;Eager;Explanation;Action Demonstration;Reinforcement Needed   Stair Training Patient Response Patient;Eager;Explanation;Reinforcement Needed;Action Demonstration   Use of Assistive Device Patient Response Patient;Acceptance;Explanation;Action Demonstration   Anticipated Discharge Equipment and Recommendations   DC Equipment Recommendations Front-Wheel Walker   Discharge Recommendations Recommend home health for continued physical therapy services

## 2021-04-07 NOTE — NON-PROVIDER
"  [facility] FAMILY MEDICINE PROGRESS NOTE      Attending:   Dr. Diaz      Resident:   Mir Thakur MD     Medical Student:  Tania Olguin, MS3     PATIENT:   Graciela Desai; [id number]; 1950     SUBJECTIVE:   Patient up and walking with assistance this morning and sitting up in chair for breakfast. When asked how she feels this morning, she replies \"good\" and denies any pain. She confirms she slept well and that she enjoyed being out of bed this morning. Interview primarily conducted by \"yes\" or \"no\" questions. She denies abdominal pain, SOB, fever/chills, anorexia, or nausea/vomiting.      OBJECTIVE:  Vitals:    04/06/21 2000 04/06/21 2356 04/07/21 0500 04/07/21 0713   BP: 129/77 126/77 122/69 105/64   Pulse: (!) 110 62 68 (!) 102   Resp: 18 18 18 16   Temp: 36.6 °C (97.9 °F) 36.8 °C (98.2 °F) 37.1 °C (98.8 °F) 37 °C (98.6 °F)   TempSrc: Temporal Temporal Temporal Temporal   SpO2: 98% 95% 95% 95%   Weight:       Height:             Intake/Output Summary (Last 24 hours) at 4/7/2021 0818  Last data filed at 4/7/2021 0442  Gross per 24 hour   Intake 480 ml   Output 2250 ml   Net -1770 ml          PHYSICAL EXAM:   General: Thin, quiet woman in no acute distress, afebrile, resting comfortably upright in chair  HEENT: NC/AT. EOMI.   Cardiovascular: sinus tachycardia without murmurs, rubs, heaves. Normal capillary refill   Respiratory: CTAB, no tachypnea or retractions   Abdomen: midline surgical incision is cool, dry, and intact; abdomen is soft, nontender, nondistended, no masses  EXT:  no lower extremity edema  Skin: No erythema/lesions; PIV in left and right upper extremity; each PIV is free of discharge, no erythema, no edema  Neuro: CN II-XII grossly intact, no focal muscle weakness        LABS:    Recent Labs     04/05/21  0053 04/06/21  0357 04/07/21  0404   WBC 17.8* 18.6* 20.3*   RBC 3.15* 3.29* 3.17*   HEMOGLOBIN 8.0* 8.2* 7.9*   HEMATOCRIT 25.1* 26.1* 24.9*   MCV 79.7* 79.3* 78.5*   MCH 25.4* 24.9* 24.9*   "   PLATELETCT 401 497* 495*   MPV 9.0 9.1 8.6*   NEUTSPOLYS 78.20* 80.00* 81.60*   LYMPHOCYTES 8.10* 8.00* 8.30*   MONOCYTES 8.00 7.00 5.60   EOSINOPHILS 2.10 1.80 1.40   BASOPHILS 0.40 0.30 0.50   RBCMORPHOLO  --   --  Present       Recent Labs     04/05/21  0053 04/06/21  0357 04/07/21  0404   SODIUM 136 138 137   POTASSIUM 4.0 4.1 4.7   CHLORIDE 100 101 100   CO2 25 25 26   GLUCOSE 112* 125* 123*   BUN 10 10 12       Recent Labs     04/05/21 0053 04/06/21  0357 04/07/21  0404   ASTSGOT 95* 82* 81*   ALTSGPT 156* 137* 126*   TBILIRUBIN 0.2 0.2 0.2   ALKPHOSPHAT 263* 267* 265*   GLOBULIN 3.9* 4.1* 3.8*        IMAGING:   EC-ECHOCARDIOGRAM COMPLETE W/ CONT   Final Result       CT-HEAD W/O   Final Result       Subacute left MCA infarct, with loss of gray-white matter differentiation in the left frontal lobe. No hemorrhagic transformation.       MR-BRAIN-WITH & W/O   Final Result   Addendum 1 of 1   Also noted in the left frontal calvarium is an area of enhancement in the    diploic space measuring approximately 11 mm which corresponds to the    location concerning for lytic lesion on the recent CT of the head. Finding    is concerning for metastatic osseous    disease.       Final       1.  Moderate-sized left middle cerebral artery acute infarct with possible early hemorrhagic transformation.   2.  Scattered punctate and small acute infarcts involving the bilateral parieto-occipital regions and left cerebellar hemisphere.   3.  There is a pattern of mild supratentorial white matter disease with scattered foci of bright T2 and FLAIR signal in the subcortical and deep white matter of both hemispheres consistent with small vessel ischemic change versus demyelination or    gliosis. Mild diffuse cerebral substance loss.   4.  Mild microangiopathic ischemic change versus demyelination or gliosis.       Attempts to convey these findings to Dr. HERNANDEZ REBOLLEDO were initiated on 3/31/2021 10:44 AM. Findings were discussed  with Dr. Palomo on 3/31/2021 at 1056 hours.       CT-CEREBRAL PERFUSION ANALYSIS   Final Result       1.  Normal study. Cerebral blood flow less than 30% likely representing completed infarct = 0 mL.       2.  T Max more than 6 seconds likely representing combination of completed infarct and ischemia = 0 mL.       3.  Mismatched volume likely representing ischemic brain/penumbra = None       4.  Please note that the cerebral perfusion was performed on the limited brain tissue around the basal ganglia region. Infarct/ischemia outside the CT perfusion sections can be missed in this study.       CT-CTA NECK WITH & W/O-POST PROCESSING   Final Result       Beaded appearance of the cervical carotid and vertebral arteries most suggestive of fibromuscular dysplasia.       No significant stenosis or dissection.       Lytic lesion in the C7 vertebral body and the left frontal calvarium. Malignancy is concerning and must be excluded.       CT-CTA HEAD WITH & W/O-POST PROCESS   Final Result       Acute/subacute left MCA infarct.       No large vessel occlusion or hemodynamically significant stenosis.       Small lytic lesion in the left frontal calvaria concerning for malignancy.       [id number] VIEW   Final Result           1.  Nonspecific bowel gas pattern.       US-EXTREMITY VENOUS LOWER BILAT   Final Result       MR-PELVIS-WITH & W/O AND SEQUENCES   Final Result           1. Stage IV endometrial cancer. The endometrial mass itself measures 13 cm, invading through the anterior uterine wall and uterine fundus.   2. Metastatic parametrial, external iliac and para-aortic lymphadenopathy.   3. Multiple pelvic osseous metastases.   4. Several uterine fibroids.               US-PELVIC TRANSABDOMINAL ONLY   Final Result       Enlarged, heterogeneous uterus could indicate underlying fibroids though malignancy cannot be excluded.       Endometrial stripe not visualized.       CT-CTA COMPLETE THORACOABDOMINAL AORTA   Final Result        No evidence of aortic aneurysm or dissection.       Partial visualization of a markedly enlarged heterogeneous uterus with hypervascularity concerning for malignancy versus fibroids.       Diverticulosis without evidence of diverticulitis.       Bilateral hydronephrosis and hydroureter likely related to enlarged uterus.       Nonobstructive left renal stone.       Bilateral pulmonary nodules measuring up to 5 mm.       Nodule recommendations:   Low Risk: No routine follow-up       High Risk: Optional CT at 12 months       Comments: Use most suspicious nodule as guide to management. Follow-up intervals may vary according to size and risk.       Low Risk - Minimal or absent history of smoking and of other known risk factors.       High Risk - History of smoking or of other known risk factors.       Note: These recommendations do not apply to lung cancer screening, patients with immunosuppression, or patients with known primary cancer.       Fleischner Society 2017 Guidelines for Management of Incidentally Detected Pulmonary Nodules in Adults       DX-CHEST-PORTABLE (1 VIEW)   Final Result       No acute cardiac or pulmonary abnormalities are identified.             CULTURES:                                             Results      Procedure Component Value Units Date/Time     URINE CULTURE(NEW) [[id number]] Collected: 03/27/21 1329     Order Status: Completed Specimen: Urine, Clean Catch Updated: 03/29/21 0832       Significant Indicator NEG       Source UR       Site URINE, CLEAN CATCH       Culture Result Usual urogenital marisa 10-50,000 cfu/mL     Narrative:       Indication for culture:->Patient WITHOUT an indwelling Beverly  catheter in place with new onset of Dysuria, Frequency,  Urgency, and/or Suprapubic pain  Indication for culture:->Patient WITHOUT an indwelling Beverly             MEDS:  Scheduled Medications   Medication Dose Frequency   • Pharmacy  1 Each PHARMACY TO DOSE   • ferric gluconate (FERRLECIT)  IV  250 mg Q24HR   • enoxaparin (LOVENOX) injection  30 mg DAILY   • aspirin EC  81 mg DAILY   • amLODIPine  5 mg Q DAY   • Pharmacy Consult Request  1 Each PHARMACY TO DOSE          ASSESSMENT/PLAN:   Graciela is a 71 y.o. female admitted for chest and back pain on 3/26/21 attributed to uterine mass discovered on imaging, now 9 days status post total abdominal hysterectomy with salpingo-oophorectomy, periaortic and pelvic lymph node dissection, omentectomy, bilateral ureterolysis with peritoneal biopsies, cystoscopy repair and bilateral ureteral stent placement. 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 Acute left MCA infarct on 3/30/21 resulted in expressive and receptive aphasia, and psychiatric evaluation determined incapacity to make medical decisions regarding cancer treatment.      #Metastatic uterine cancer, adenocarcinoma  S/p total abdominal hysterectomy, oophorectomy, omentecomy 3/29/2021; POD # 9.  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 Quinteros, patient and SO, Star, seem interested in continued discussions of goals of care  -Palliative consult on 4/2/21 recommended cognitive evaluation with SLP  -per note, SLP unable to perform cognitive evaluation due to severity of aphasia, deferred to psych, who saw patient on 4/6 and determined patient lacks capacity to make medical decisions, specifically regarding her cancer treatment  -need to determine POA prior to discharge  -Awaiting patient visit with oncologist     #Acute left MCA infarct  Development of expressive aphasia with confusion on 3/30/2021, subsequent findings of acute infarction upon imaging.  Per neurology, pattern of infarct suspicious for cardioembolic phenomenon (likely related to hypercoagulability due to underlying cancer)  -Repeat CT head without contrast ruled out hemorrhagic conversion  -Echocardiogram ordered for possible  cardioembolic origin of MRI brain findings; echo has now returned with EF 60%; mildly dilated LA; RVSP 29 mm Hg  - Aspirin started 4/2/21  -Overall prognosis is poor and would like to involve palliative care  -PT/OT has seen patient, recommends either home health with SO present 24/7 or skilled nursing after discharge  -SLP recommends postacute rehab for speech therapy  -swallow precautions in place per SLP, liquidized solids okay     #Leukocytosis with bandemia  -WBC of 20.3 today, up from 18.6 yesterday  -left-shift noted on differential  -Pulse 102 and /64 this AM  -continue to monitor for signs of fever, has been afebrile with temp 98.8 F this AM  -due to nurse's report of watery diarrhea,C. difficile toxin testing was ordered but only solid stool was made by patient in last 24hrs, so order cancelled  -UA from 4/6 revealed large leukocyte esterase, +WBCS and RBCS, with few bacteria  -empiric treatment for UTI  -will discuss leukocytosis with surgeon, Dr. Quinteros, in addition to drain removal as no drainage in past few days     #Acute transaminitis  Patient with acute rise in transaminases, suspected to be due to shock liver  -Ammonia level normal  -Holding statin, Tylenol; hold all other hepatotoxins  -Hold Percocet  -The downward trend is continuing today, with AST and ALT 81 and 126, respectively  -Continue to monitor     #Iron deficiency anemia due to chronic blood loss  -Hemoglobin this AM is 7.9  -Aspirin and lovenox were started 4/2  -Due to low MCV and chronic blood loss, started IV Iron per pharmacy 4/4  -Continue to monitor, transfuse as indicated for hemoglobin less than 7     #Hypokalemia  Continue to monitor, replace as indicated  -K this AM is 4.7, up from 3.4 on admission     #Hyperlipidemia  -Holding statin as above     #Essential hypertension  -Holding home amlodipine     #DVT Prophylaxis  -Per gynecology, OK to start lovenox DVT ppx once hemoglobin has stabilized  -Lovenox started  4/2/21     Core Measures:  DVT PPX: Lovenox, SCDs  ABX: Ceftriaxone  Lines: PIV in b/l upper extremities  Fluids: None  PCP: Shan Tuttle MD  CODE STATUS: Full     Dispo: Inpatient

## 2021-04-07 NOTE — FACE TO FACE
Face to Face Note  -  Durable Medical Equipment    Mir Thakur M.D. - NPI: [id number]  I certify that this patient is under my care and that they had a durable medical equipment(DME)face to face encounter by myself that meets the physician DME face-to-face encounter requirements with this patient on:    Date of encounter:   Patient:                    MRN:                       YOB: 2019  Graciela Desai  [id number]  1950     The encounter with the patient was in whole, or in part, for the following medical condition, which is the primary reason for durable medical equipment:  CVA    I certify that, based on my findings, the following durable medical equipment is medically necessary:  Walkers.       My Clinical findings support the need for the above equipment due to:  Abnormal Gait    Supporting Symptoms: weakness    If patient feels more short of breath, they can go up to 6 liters per minute and contact healthcare provider.

## 2021-04-07 NOTE — THERAPY
Physical Therapy   Daily Treatment     Patient Name: Graciela Desai  Age:  71 y.o., Sex:  female  Medical Record #: [id number]  Today's Date: 4/6/2021     Precautions: Fall Risk, Swallow Precautions ( See Comments)(expressive and receptive deficits)    Assessment    Today, pt needs min A with trial of ambulation using no AD due to wobbly, LOB. Pt is supervised when using 4WW x 100 feet, asking to turn back due to fatigue. Pt shows some expressive and receptive language deficits but able to make herself clear with effort. Pt's SO is in room. PT to continue.     Plan    Continue current treatment plan.    DC Equipment Recommendations: Unable to determine at this time  Discharge Recommendations: Recommend home health for continued physical therapy services(if SO can be present 24/7. )         Objective       04/06/21 1716   Precautions   Precautions Fall Risk;Swallow Precautions ( See Comments)  (expressive and receptive deficits)   Comments acute CVA, L MCA, bilat parieto-occipital, Left cerebellar   Cognition    Cognition / Consciousness X   Speech/ Communication Delayed Responses;Expressive Aphasia;Receptive Aphasia   Level of Consciousness Alert   Ability To Follow Commands 1 Step  (inconsistent following)   Initiation Impaired   Balance   Comments wobbly, LOB when first up, reaching for therapist, does much better with 4WW. nsg reports that pt was just given meds that are making her sleepy.    Gait Analysis   Gait Level Of Assist Supervised  (min A with ambulation using no AD)   Assistive Device 4 Wheel Walker   Distance (Feet) 100   Bed Mobility    Supine to Sit Supervised   Scooting Supervised   Rolling Supervised   Functional Mobility   Sit to Stand Supervised   Bed, Chair, Wheelchair Transfer Supervised   Short Term Goals    Short Term Goal # 1 Pt will be transfer with LRAD with SPV from bed to chair by the 6th PT visit   Goal Outcome # 1 Goal met   Short Term Goal # 2 Pt will ambulate w/ LRAD for 150 ft with  SPV by the 6th tx   Goal Outcome # 2 Goal not met   Short Term Goal # 3 PT will ascend and descend steps x 2 with SPV by the 6th tx   Goal Outcome # 3 Goal not met   Short Term Goal # 4 Pt will maintain standing balance w/single UE support for 3 min for ADL's by the 6th tx   Goal Outcome # 4 Goal not met   Anticipated Discharge Equipment and Recommendations   DC Equipment Recommendations Unable to determine at this time   Discharge Recommendations Recommend home health for continued physical therapy services  (if SO can be present 24/7. )

## 2021-04-07 NOTE — PROGRESS NOTES
[facility] PROGRESS NOTE     Attending: Dr. Luís Diaz    Resident: Dr. Otilia Hicks    PATIENT: Graciela Desai; [id number]; 1950 Hospital Day: 14    71 y.o. female admitted for metastatic endometrial carcinosarcoma and post operative recovery. Now POD #9     SUBJECTIVE: Overnight, Graciela complained of abdominal pain for the first time in 3 days. Pain was improved with prn pain medication. She has continued to have loose stools, now x 3 episodes in the past few days. Not liquid stools.     She continues to be intermittently tachycardic and without fevers.     OBJECTIVE:     Vitals:    04/06/21 1520 04/06/21 2000 04/06/21 2356 04/07/21 0500   BP: 120/71 129/77 126/77 122/69   Pulse: 100 (!) 110 62 68   Resp: 17 18 18 18   Temp: 36.6 °C (97.8 °F) 36.6 °C (97.9 °F) 36.8 °C (98.2 °F) 37.1 °C (98.8 °F)   TempSrc: Temporal Temporal Temporal Temporal   SpO2: 100% 98% 95% 95%   Weight:       Height:           Intake/Output Summary (Last 24 hours) at 4/7/2021 0710  Last data filed at 4/7/2021 0442  Gross per 24 hour   Intake 480 ml   Output 2250 ml   Net -1770 ml       PE:   General: No acute distress, resting comfortably in bed.  HEENT: NC/AT. PERRLA. EOMI. MMM  Cardiovascular: RRR with no murmurs, rubs, or gallops  Respiratory: Symmetric expansion. CTAB with no crackles or wheezing  Abdomen: Soft, minimally tender over KATIUSKA drain site and Left abdomen, non distended, no masses, bowel sounds present. KATIUSKA drain in place over LLQ . No rebound or guarding  EXT:  Moves all extremities, no cyanosis, clubbing, or edema. Pulses 2+ and present in all 4  Neuro: alert and oriented to person, place, situation, expressive aphasia present and dysarthria significantly improved. CN II-XII grossly intact. Strength 5/5 throughout-grossly normal  Skin: In tact without lesions, rashes or appreciable jaundice  Psych: Appropriate affect      LABS:  Recent Labs     04/05/21  0053 04/06/21  0357 04/07/21  0404   WBC 17.8* 18.6* 20.3*    RBC 3.15* 3.29* 3.17*   HEMOGLOBIN 8.0* 8.2* 7.9*   HEMATOCRIT 25.1* 26.1* 24.9*   MCV 79.7* 79.3* 78.5*   MCH 25.4* 24.9* 24.9*   PLATELETCT 401 497* 495*   MPV 9.0 9.1 8.6*   NEUTSPOLYS 78.20* 80.00* 81.60*   LYMPHOCYTES 8.10* 8.00* 8.30*   MONOCYTES 8.00 7.00 5.60   EOSINOPHILS 2.10 1.80 1.40   BASOPHILS 0.40 0.30 0.50   RBCMORPHOLO  --   --  Present     Recent Labs     04/05/21 0053 04/06/21 0357 04/07/21  0404   SODIUM 136 138 137   POTASSIUM 4.0 4.1 4.7   CHLORIDE 100 101 100   CO2 25 25 26   BUN 10 10 12   CREATININE 0.65 0.69 0.77   CALCIUM 8.5 8.7 8.6   ALBUMIN 2.5* 2.6* 2.5*     Estimated GFR/CRCL = Estimated Creatinine Clearance: 57.6 mL/min (by C-G formula based on SCr of 0.77 mg/dL).  Recent Labs     04/05/21 0053 04/06/21 0357 04/07/21  0404   GLUCOSE 112* 125* 123*     Recent Labs     04/05/21 0053 04/06/21 0357 04/07/21  0404   ASTSGOT 95* 82* 81*   ALTSGPT 156* 137* 126*   TBILIRUBIN 0.2 0.2 0.2   ALKPHOSPHAT 263* 267* 265*   GLOBULIN 3.9* 4.1* 3.8*             No results for input(s): INR, APTT, FIBRINOGEN in the last 72 hours.    Invalid input(s): DIMER    MICROBIOLOGY: Awaiting collection of stool and urine for C dif and UA      IMAGING:   EC-ECHOCARDIOGRAM COMPLETE W/ CONT   Final Result      CT-HEAD W/O   Final Result      Subacute left MCA infarct, with loss of gray-white matter differentiation in the left frontal lobe. No hemorrhagic transformation.      MR-BRAIN-WITH & W/O   Final Result   Addendum 1 of 1   Also noted in the left frontal calvarium is an area of enhancement in the    diploic space measuring approximately 11 mm which corresponds to the    location concerning for lytic lesion on the recent CT of the head. Finding    is concerning for metastatic osseous    disease.      Final      1.  Moderate-sized left middle cerebral artery acute infarct with possible early hemorrhagic transformation.   2.  Scattered punctate and small acute infarcts involving the bilateral  parieto-occipital regions and left cerebellar hemisphere.   3.  There is a pattern of mild supratentorial white matter disease with scattered foci of bright T2 and FLAIR signal in the subcortical and deep white matter of both hemispheres consistent with small vessel ischemic change versus demyelination or    gliosis. Mild diffuse cerebral substance loss.   4.  Mild microangiopathic ischemic change versus demyelination or gliosis.      Attempts to convey these findings to Dr. HERNANDEZ PALOMO were initiated on 3/31/2021 10:44 AM. Findings were discussed with Dr. Palomo on 3/31/2021 at 1056 hours.      CT-CEREBRAL PERFUSION ANALYSIS   Final Result      1.  Normal study. Cerebral blood flow less than 30% likely representing completed infarct = 0 mL.      2.  T Max more than 6 seconds likely representing combination of completed infarct and ischemia = 0 mL.      3.  Mismatched volume likely representing ischemic brain/penumbra = None      4.  Please note that the cerebral perfusion was performed on the limited brain tissue around the basal ganglia region. Infarct/ischemia outside the CT perfusion sections can be missed in this study.      CT-CTA NECK WITH & W/O-POST PROCESSING   Final Result      Beaded appearance of the cervical carotid and vertebral arteries most suggestive of fibromuscular dysplasia.      No significant stenosis or dissection.      Lytic lesion in the C7 vertebral body and the left frontal calvarium. Malignancy is concerning and must be excluded.      CT-CTA HEAD WITH & W/O-POST PROCESS   Final Result      Acute/subacute left MCA infarct.      No large vessel occlusion or hemodynamically significant stenosis.      Small lytic lesion in the left frontal calvaria concerning for malignancy.      [id number] VIEW   Final Result         1.  Nonspecific bowel gas pattern.      US-EXTREMITY VENOUS LOWER BILAT   Final Result      MR-PELVIS-WITH & W/O AND SEQUENCES   Final Result         1. Stage IV  endometrial cancer. The endometrial mass itself measures 13 cm, invading through the anterior uterine wall and uterine fundus.   2. Metastatic parametrial, external iliac and para-aortic lymphadenopathy.   3. Multiple pelvic osseous metastases.   4. Several uterine fibroids.            US-PELVIC TRANSABDOMINAL ONLY   Final Result      Enlarged, heterogeneous uterus could indicate underlying fibroids though malignancy cannot be excluded.      Endometrial stripe not visualized.      CT-CTA COMPLETE THORACOABDOMINAL AORTA   Final Result      No evidence of aortic aneurysm or dissection.      Partial visualization of a markedly enlarged heterogeneous uterus with hypervascularity concerning for malignancy versus fibroids.      Diverticulosis without evidence of diverticulitis.      Bilateral hydronephrosis and hydroureter likely related to enlarged uterus.      Nonobstructive left renal stone.      Bilateral pulmonary nodules measuring up to 5 mm.      Nodule recommendations:   Low Risk: No routine follow-up      High Risk: Optional CT at 12 months      Comments: Use most suspicious nodule as guide to management. Follow-up intervals may vary according to size and risk.      Low Risk - Minimal or absent history of smoking and of other known risk factors.      High Risk - History of smoking or of other known risk factors.      Note: These recommendations do not apply to lung cancer screening, patients with immunosuppression, or patients with known primary cancer.      Fleischner Society 2017 Guidelines for Management of Incidentally Detected Pulmonary Nodules in Adults      DX-CHEST-PORTABLE (1 VIEW)   Final Result      No acute cardiac or pulmonary abnormalities are identified.            MEDS:  Current Facility-Administered Medications   Medication Last Admin   • Pharmacy Consult Request     • oxyCODONE immediate-release (ROXICODONE) tablet 5 mg 5 mg at 04/07/21 0053   • ferric gluconate complex (FERRLECIT) 250 mg in NS  100 mL IVPB Stopped at 04/06/21 1738   • enoxaparin (LOVENOX) inj 30 mg 30 mg at 04/07/21 0437   • aspirin EC (ECOTRIN) tablet 81 mg 81 mg at 04/07/21 0437   • amLODIPine (NORVASC) tablet 5 mg 5 mg at 04/06/21 1637   • haloperidol lactate (HALDOL) injection 1 mg 1 mg at 04/06/21 1637   • Pharmacy Consult Request ...Pain Management Review 1 Each     • ondansetron (ZOFRAN) syringe/vial injection 4 mg 4 mg at 04/06/21 1638   • NS (BOLUS) infusion 500 mL     • prochlorperazine (COMPAZINE) injection 10 mg     • ondansetron (ZOFRAN ODT) dispertab 4 mg           ASSESSMENT/PLAN: Ms. 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      Now POD #8 s/p abd hysterectomy, bilat oopherectomy salpingectomy, pelvic and chintan-aortic lymph node dissections, omentectomy, bilateral uretrolysis, peritoneal biopsies, cystotomy repair and bilateral ureteral stent placement on 3/29/21     Course complicated by perioperative L MCA infarct with primary deficit of receptive and expressive aphasia.      # Metastatic uterine carcinosarcoma: Stage IVB  Gyn onc Dr. Carey and Shonda Quinteros following. Appreciate recommendations:               -Ureteral stents x 6 weeks              -Beverly x 3 weeks              -OOB to chair               -PT for post op mobility              -s/p discussion regarding prognosis, treatment options and SE, and expected disease course. Patient is not capable of making medical decisions per psychiatry evaluation of capacity on 4/6   -Drain removal prior to discharge    Palliative following, appreciate recommendations.   POA determination pending     # Perioperative L MCA  Suspected cardioembolic per neurology, c/w CT findings. Repeat CT with no evidence of hemorrhagic conversion. Echo showing mild dilation of LA and RVSP 29 mm, EF 60%              -Continued PT/OT/SLP              -Continue telemetry monitoring              -Continue  ASA              -Anticipate Postacute rehab for Speech therapy              -f/u palliative recommendations                  # Diarrhea  2 episodes diarrhea loose stool, recent antibiotic exposure  c diff pending with precautions in place and stool PCR ordered. Stools have become more formed and testing has been discontinued. Now low suspicion for c. Dif      # Transaminitis  Likely secondary to shock. Downtrending from 800-900's to 81 AST and 126 ALT today.   Daily CMP   Avoid hepatotoxic medications: tylenol, statin     # Anemia, microcytic  Multifactorial, primarily blood loss and ACD with SARAH, hgb 7.9 today  IV iron   Transfuse for Hgb <7  Daily CBC     # Leukocytosis  Continues to trend upward with bandemia and neutrophilic predominance  Possibly secondary to recent steroid administration, though seeminglu less likely  Low threshold for initiating empiric ABX and pan culture if fevers occur.   -UA  -Discuss with Gyn Onc surgeon     # HLD  # HTN  Holding statin  Continue amlodipine     Lines: PIV, Beverly, KATIUSKA drain  IVF: none  Abx: None  GI PPx:Multimodal  DVT PPx: SCD's, lovenox  Code: Full  Diet: thick liquids     Dispo: Inpatient for further determination of medical discharge needs and postoperative recovery    Otilia Hicks MD  PGY-1 Family Medicine Resident  [facility]

## 2021-04-07 NOTE — THERAPY
Occupational Therapy  Daily Treatment     Patient Name: Graciela Desai  Age:  71 y.o., Sex:  female  Medical Record #: [id number]  Today's Date: 4/7/2021     Precautions  Precautions: Fall Risk, Swallow Precautions ( See Comments)  Comments: acute CVA, L MCA, bilat parieto-occipital, Left cerebellar    Assessment    Pt currently limited by decreased functional mobility, activity tolerance, cognition, balance, which are affecting pt's ability to complete ADLs/IADLs at baseline. Pt would benefit from OT services in the acute care setting to maximize functional recovery.     Plan    Continue current treatment plan.    DC Equipment Recommendations: Unable to determine at this time  Discharge Recommendations: Recommend home health for continued occupational therapy services(if SO can provide 24/7 supervision)       04/07/21 0735   Activities of Daily Living   Grooming Supervision   Upper Body Dressing Supervision   Lower Body Dressing Minimal Assist   Functional Mobility   Sit to Stand Supervised   Bed, Chair, Wheelchair Transfer Supervised   Short Term Goals   Short Term Goal # 1 Pt will perform functional t/f's with supervision   Goal Outcome # 1 Goal met   Short Term Goal # 2 supervised with LB dressing   Goal Outcome # 2 Progressing as expected

## 2021-04-07 NOTE — FACE TO FACE
Face to Face Supporting Documentation - Home Health    The encounter with this patient was in whole or in part the primary reason for home health admission.    Date of encounter:   Patient:                    MRN:                       YOB: 2019  Graciela Desai  [id number]  1950     Home health to see patient for:  Home health aide, Physical Therapy evaluation and treatment, Occupational therapy evaluation and treatment and Speech Language Pathology evaluation and treatment    Skilled need for:  Surgical Aftercare hysterectomy, and ex-lap for metatstatic uterine cancer and New Onset Medical Diagnosis metastatic uterine cancer    Skilled nursing interventions to include:  Line/Drain/Airway education and care    Homebound status evidenced by:  Need the aid of supportive devices such as crutches, canes, wheelchairs or walkers or Needs the assistance of another person in order to leave the home. Leaving home requires a considerable and taxing effort. There is a normal inability to leave the home.    Community Physician to provide follow up care: Shan Tuttle M.D.     Optional Interventions? No      I certify the face to face encounter for this home health care referral meets the CMS requirements and the encounter/clinical assessment with the patient was, in whole, or in part, for the medical condition(s) listed above, which is the primary reason for home health care. Based on my clinical findings: the service(s) are medically necessary, support the need for home health care, and the homebound criteria are met.  I certify that this patient has had a face to face encounter by myself.  Mir Thakur M.D. - NPI: [id number]

## 2021-04-07 NOTE — PROGRESS NOTES
"I was notified by the health care team that there were some questions about Ms. Desai's capacity to select a POA for medical decisions.  I discussed the case with family medicine resident Mir Thakur who requested an evaluation and opinion from me on the patient's capacity to select a POA.    Briefly Ms. Desai was admitted on 3/26/2021 with complaints of chest pain, she was found to be anemic.  Additionally work-up in the emergency room included a CT scan that demonstrated a uterine mass.  The patient was admitted and gynecology was consulted.  On 3/29/2021 the patient underwent abdominal hysterectomy, salpingo-oophorectomy, and omentectomy.  On 3/30/2021 a code stroke was called for confusion and aphasia.  Subsequent imaging demonstrated a moderate sized left middle cerebral artery acute infarct.  Most recent therapy notes indicate the patient would benefit from 24-hour supervision and home health for continued therapy.  I did review consult note from psychiatry on 4/6/2021 indicating \"patient lacks capacity to make medical decisions, specifically regarding her cancer treatment\"    On interview and examination, the patient clearly has some expressive aphasia.  At times her speech is very fluent, other times she has difficulty providing single word answers.  The patient does occasionally answer yes or no incorrectly.  When given time to answer, the patient is oriented to person, place, and year.  She is able to tell me her address and that she has been with her significant other Star for 29 years.  I asked her on 3 separate occasions who she would want to make medical decisions for her, each time she answers Star.  I did ask about her children, she responds \"they do not know me.\"  I also spoke with Star who is willing to take up the responsibility of caring for Graciela.  He understands that she will need ongoing [facility] care and he is agreeable to having home health in the home for skilled therapies.  Star " indicates that the patient has made steady improvement with her speech and he feels she has even improved today as compared to yesterday.    I have spoken with Debbie Thibodeaux of palliative care and Mir Thakur MD family medicine.  Both parties indicated that Ms. Desai has consistently expressed a choice for Star to be her POA    In my opinion Ms. Desai has capacity to select Star as her POA and to discharge home with him.  Despite her aphasia, the patient is oriented and  is able to consistently express this choice.  I will also note that I have taken into account the risk of the decision she is making.  The risk of discharging home with her significant other who has her best interest in mind is considerably less than appointing one of her estranged children as POA or even the risk associated with obtaining a public guardian.    Impression and recommendations conveyed by phone to Dr. Thakur.

## 2021-04-07 NOTE — CARE PLAN
Problem: Pain Management  Goal: Pain level will decrease to patient's comfort goal  Outcome: PROGRESSING AS EXPECTED  Note: Controlled with PRN oxycodone     Problem: Infection  Goal: Will remain free from infection  Outcome: PROGRESSING AS EXPECTED  Note: WBC elevated, afebrile, monitor for s/s of infection     Problem: Urinary Elimination:  Goal: Ability to reestablish a normal urinary elimination pattern will improve  Outcome: PROGRESSING AS EXPECTED  Flowsheets (Taken 4/6/2021 1951)  Urinary Elimination: Catheter (Document on LDA)     Problem: Discharge Barriers/Planning  Goal: Patient's continuum of care needs will be met  Note: Pending ethics consult for POA determination and deciding POC of CA treatment vs palliative/hospice

## 2021-04-08 LAB
ALBUMIN SERPL BCP-MCNC: 2.6 G/DL (ref 3.2–4.9)
ALBUMIN/GLOB SERPL: 0.7 G/DL
ALP SERPL-CCNC: 250 U/L (ref 30–99)
ALT SERPL-CCNC: 91 U/L (ref 2–50)
ANION GAP SERPL CALC-SCNC: 7 MMOL/L (ref 7–16)
AST SERPL-CCNC: 45 U/L (ref 12–45)
BASOPHILS # BLD AUTO: 0.4 % (ref 0–1.8)
BASOPHILS # BLD: 0.09 K/UL (ref 0–0.12)
BILIRUB SERPL-MCNC: <0.2 MG/DL (ref 0.1–1.5)
BUN SERPL-MCNC: 13 MG/DL (ref 8–22)
CALCIUM SERPL-MCNC: 8.6 MG/DL (ref 8.5–10.5)
CHLORIDE SERPL-SCNC: 99 MMOL/L (ref 96–112)
CO2 SERPL-SCNC: 29 MMOL/L (ref 20–33)
CREAT SERPL-MCNC: 0.76 MG/DL (ref 0.5–1.4)
EOSINOPHIL # BLD AUTO: 0.25 K/UL (ref 0–0.51)
EOSINOPHIL NFR BLD: 1.2 % (ref 0–6.9)
GLOBULIN SER CALC-MCNC: 3.8 G/DL (ref 1.9–3.5)
GLUCOSE SERPL-MCNC: 117 MG/DL (ref 65–99)
HCT VFR BLD AUTO: 24.8 % (ref 37–47)
HGB BLD-MCNC: 7.7 G/DL (ref 12–16)
IMM GRANULOCYTES # BLD AUTO: 0.26 K/UL (ref 0–0.11)
IMM GRANULOCYTES NFR BLD AUTO: 1.3 % (ref 0–0.9)
LYMPHOCYTES # BLD AUTO: 1.36 K/UL (ref 1–4.8)
LYMPHOCYTES NFR BLD: 6.7 % (ref 22–41)
MCH RBC QN AUTO: 25.4 PG (ref 27–33)
MCHC RBC AUTO-ENTMCNC: 31 G/DL (ref 33.6–35)
MCV RBC AUTO: 81.8 FL (ref 81.4–97.8)
MONOCYTES # BLD AUTO: 1.11 K/UL (ref 0–0.85)
MONOCYTES NFR BLD AUTO: 5.4 % (ref 0–13.4)
NEUTROPHILS # BLD AUTO: 17.31 K/UL (ref 2–7.15)
NEUTROPHILS NFR BLD: 85 % (ref 44–72)
NRBC # BLD AUTO: 0.06 K/UL
NRBC BLD-RTO: 0.3 /100 WBC
PLATELET # BLD AUTO: 519 K/UL (ref 164–446)
PMV BLD AUTO: 8.6 FL (ref 9–12.9)
POTASSIUM SERPL-SCNC: 3.9 MMOL/L (ref 3.6–5.5)
PROT SERPL-MCNC: 6.4 G/DL (ref 6–8.2)
RBC # BLD AUTO: 3.03 M/UL (ref 4.2–5.4)
SODIUM SERPL-SCNC: 135 MMOL/L (ref 135–145)
WBC # BLD AUTO: 20.4 K/UL (ref 4.8–10.8)

## 2021-04-08 PROCEDURE — A9270 NON-COVERED ITEM OR SERVICE: HCPCS | Performed by: STUDENT IN AN ORGANIZED HEALTH CARE EDUCATION/TRAINING PROGRAM

## 2021-04-08 PROCEDURE — 700105 HCHG RX REV CODE 258: Performed by: STUDENT IN AN ORGANIZED HEALTH CARE EDUCATION/TRAINING PROGRAM

## 2021-04-08 PROCEDURE — 700102 HCHG RX REV CODE 250 W/ 637 OVERRIDE(OP): Performed by: STUDENT IN AN ORGANIZED HEALTH CARE EDUCATION/TRAINING PROGRAM

## 2021-04-08 PROCEDURE — 92526 ORAL FUNCTION THERAPY: CPT

## 2021-04-08 PROCEDURE — 700111 HCHG RX REV CODE 636 W/ 250 OVERRIDE (IP): Performed by: STUDENT IN AN ORGANIZED HEALTH CARE EDUCATION/TRAINING PROGRAM

## 2021-04-08 PROCEDURE — 80053 COMPREHEN METABOLIC PANEL: CPT

## 2021-04-08 PROCEDURE — 85025 COMPLETE CBC W/AUTO DIFF WBC: CPT

## 2021-04-08 PROCEDURE — 99232 SBSQ HOSP IP/OBS MODERATE 35: CPT | Performed by: STUDENT IN AN ORGANIZED HEALTH CARE EDUCATION/TRAINING PROGRAM

## 2021-04-08 PROCEDURE — 770006 HCHG ROOM/CARE - MED/SURG/GYN SEMI*

## 2021-04-08 PROCEDURE — 770020 HCHG ROOM/CARE - TELE (206)

## 2021-04-08 PROCEDURE — 97116 GAIT TRAINING THERAPY: CPT | Mod: CQ

## 2021-04-08 RX ADMIN — SODIUM CHLORIDE 250 MG: 9 INJECTION, SOLUTION INTRAVENOUS at 16:27

## 2021-04-08 RX ADMIN — OXYCODONE 5 MG: 5 TABLET ORAL at 16:26

## 2021-04-08 RX ADMIN — AMLODIPINE BESYLATE 5 MG: 5 TABLET ORAL at 16:26

## 2021-04-08 RX ADMIN — CEFTRIAXONE SODIUM 2 G: 2 INJECTION, POWDER, FOR SOLUTION INTRAMUSCULAR; INTRAVENOUS at 05:01

## 2021-04-08 RX ADMIN — ENOXAPARIN SODIUM 30 MG: 30 INJECTION SUBCUTANEOUS at 05:01

## 2021-04-08 RX ADMIN — ASPIRIN 81 MG: 81 TABLET, COATED ORAL at 05:01

## 2021-04-08 ASSESSMENT — PAIN DESCRIPTION - PAIN TYPE
TYPE: ACUTE PAIN
TYPE: ACUTE PAIN
TYPE: ACUTE PAIN;CHRONIC PAIN
TYPE: ACUTE PAIN

## 2021-04-08 ASSESSMENT — ENCOUNTER SYMPTOMS
MYALGIAS: 0
NAUSEA: 0
INSOMNIA: 0
ABDOMINAL PAIN: 0
VOMITING: 0
HEADACHES: 0
COUGH: 0
DIZZINESS: 0
BACK PAIN: 0
SHORTNESS OF BREATH: 0

## 2021-04-08 ASSESSMENT — GAIT ASSESSMENTS
DEVIATION: DECREASED BASE OF SUPPORT;BRADYKINETIC
GAIT LEVEL OF ASSIST: MINIMAL ASSIST
ASSISTIVE DEVICE: FRONT WHEEL WALKER
DISTANCE (FEET): 300

## 2021-04-08 ASSESSMENT — COGNITIVE AND FUNCTIONAL STATUS - GENERAL
WALKING IN HOSPITAL ROOM: A LITTLE
MOBILITY SCORE: 21
CLIMB 3 TO 5 STEPS WITH RAILING: A LOT
SUGGESTED CMS G CODE MODIFIER MOBILITY: CJ

## 2021-04-08 ASSESSMENT — LIFESTYLE VARIABLES: SUBSTANCE_ABUSE: 0

## 2021-04-08 ASSESSMENT — FIBROSIS 4 INDEX: FIB4 SCORE: 0.63

## 2021-04-08 NOTE — PROGRESS NOTES
Assumed care of patient at bedside report from NOC RN. Updated on POC. Patient currently sleeping. Call light within reach. Whiteboard updated. Fall precautions in place. Bed locked and in lowest position.

## 2021-04-08 NOTE — CARE PLAN
Problem: Pain Management  Goal: Pain level will decrease to patient's comfort goal  Outcome: PROGRESSING AS EXPECTED  Note: Assess pain level. Administer appropriate pain medication as ordered, reassess response. Continue to educate patient on purpose, expectations, non-pharmacologic ways to decrease pain. Pt reports pain is currently under control.      Problem: Bowel/Gastric:  Goal: Normal bowel function is maintained or improved  Outcome: PROGRESSING AS EXPECTED  Note: Denies N/V. Last BM at 4/7. Denies abdominal tenderness.   Goal: Will not experience complications related to bowel motility  Outcome: PROGRESSING AS EXPECTED     Problem: Mobility  Goal: Risk for activity intolerance will decrease  Outcome: PROGRESSING AS EXPECTED  Note: Patient ambulates with steady gait with hand held assist. Patient calls appropriately.      Problem: Knowledge Deficit  Goal: Knowledge of disease process/condition, treatment plan, diagnostic tests, and medications will improve  Outcome: PROGRESSING AS EXPECTED  Note: Updated patient and Star, , with patient's plan of care.   Goal: Knowledge of the prescribed therapeutic regimen will improve  Outcome: PROGRESSING AS EXPECTED

## 2021-04-08 NOTE — DISCHARGE PLANNING
Received Choice form at 1310  Agency/Facility Name: [facility]   Referral sent per Choice form @ 1315     Received Choice form at 1310  Agency/Facility Name: [facility]   Referral sent per Choice form @ 1315     Received Choice form at 1500  Agency/Facility Name: [facility] [place]   Referral sent per Choice form @ 1508

## 2021-04-08 NOTE — DISCHARGE PLANNING
[facility] notified this RN CM that they have declined patient because as of 4/1/2021, she has [employer] insurance.

## 2021-04-08 NOTE — DISCHARGE PLANNING
Anticipated Discharge Disposition: home with home and walker    Action: UMM ALVAREZ updated by Jessica with palliative care that the advance directive has been notarized. RN CM met with patient and KATHLEEN Graves, at bedside and obtained choice for [facility], [facility], [facility]. Also obtained verbal consent to send referral to [facility] for FWW ordered. Choices faxed to [facility].    Barriers to Discharge: home health set up, FWW delivered, medical clearance    Plan: Case coordination to f/u with home health referral

## 2021-04-08 NOTE — CARE PLAN
Problem: Pain Management  Goal: Pain level will decrease to patient's comfort goal  Outcome: PROGRESSING AS EXPECTED     Problem: Psychosocial Needs:  Goal: Level of anxiety will decrease  Outcome: PROGRESSING AS EXPECTED     Problem: Respiratory:  Goal: Respiratory status will improve  Outcome: PROGRESSING AS EXPECTED     Problem: Bowel/Gastric:  Goal: Normal bowel function is maintained or improved  Outcome: PROGRESSING AS EXPECTED

## 2021-04-08 NOTE — THERAPY
Physical Therapy   Daily Treatment     Patient Name: Graciela Desai  Age:  71 y.o., Sex:  female  Medical Record #: [id number]  Today's Date: 4/8/2021     Precautions: Fall Risk, Swallow Precautions ( See Comments)    Assessment    Pt pleasant in good spirits and motivated to participate in therapy session. Word finding is improving but still presents with delayed responses. She completed transfers with spv and fww. Gait training with increased distance requiring CGA for safety. She is easily distracted and required vc to avoid obstacles within hallway increasing her risk for falling. She is easily redirectable but would benefit from continued acute skilled therapy at this time. Per previous notes anticipate dc home with SP can provide 24/7 assist.      Plan    Continue current treatment plan.    DC Equipment Recommendations: Front-Wheel Walker  Discharge Recommendations: Recommend home health for continued physical therapy services         04/08/21 0945   Balance   Sitting Balance (Static) Good   Sitting Balance (Dynamic) Good   Standing Balance (Static) Fair   Standing Balance (Dynamic) Fair -   Weight Shift Sitting Fair   Weight Shift Standing Fair   Comments with fww, no overt lob at this time.    Gait Analysis   Gait Level Of Assist Minimal Assist   Assistive Device Front Wheel Walker   Distance (Feet) 300   # of Times Distance was Traveled 1   Deviation Decreased Base Of Support;Bradykinetic   Skilled Intervention Verbal Cuing;Sequencing   Comments heavy reliance on bue, difficulty navigating fww in hallway with obstacles. Wilmer for balance and frequent vc for staying focused on task    Bed Mobility    Scooting Supervised   Comments up in chair pre and post    Functional Mobility   Sit to Stand Supervised   Bed, Chair, Wheelchair Transfer Supervised   Comments pt able to teachback proper hand placement and to keep fww close    Short Term Goals    Short Term Goal # 2 Pt will ambulate w/ LRAD for 150 ft with SPV  by the 6th tx   Goal Outcome # 2 Goal not met   Short Term Goal # 4 Pt will maintain standing balance w/single UE support for 3 min for ADL's by the 6th tx   Goal Outcome # 4 Goal not met

## 2021-04-08 NOTE — DISCHARGE PLANNING
Anticipated Discharge Disposition: home with home health    Action: RN CM notified that usual notary is unable to come today, which is pending for AD for this RN ANTONIO to be able to get home health choice to set up for patient. UMM ALVAREZ spoke with Jessica with palliative care and she was able to get another notary to come today who will be here in about an hour to notarize AD. Once complete, RN CM will obtain home health choice from [facility].    Barriers to Discharge: home health set up    Plan: Case coordination to f/u with SO for home health choice once AD notarized

## 2021-04-08 NOTE — THERAPY
Speech Language Pathology  Daily Treatment     Patient Name: Graciela Desai  Age:  71 y.o., Sex:  female  Medical Record #: [id number]  Today's Date: 4/8/2021     Precautions  Precautions: Fall Risk, Swallow Precautions ( See Comments)  Comments: acute CVA, L MCA, bilat parieto-occipital, Left cerebellar    Assessment  Patient seen this date for dysphagia tx session. Patient currently on LQ3/MT2 diet and per RN, appears to be tolerating diet without difficulty. Patient awake, alert, and pleasant during tx session. Patient still noted to have expressive and receptive language deficits, as well as oral apraxia during session. Patient consumed PO trials of MTL via cup sip and straw, pudding, minced/moist foods, and soft/bite-sized foods. Patient unable to coordinate straw sips, likely d/t oral apraxia. Patient has no lower dentures and  who was present for second half of session reported that patient typically eats even hard solids despite this, but often pockets them in her cheek to get them softer. Patient consumed PO trials of soft solids with prolonged mastication of greater than 2-3 minutes at times. Patient also presented with significant impulsivity, chugging entire 4 oz of MTL, taking large, heaping bites from the spoon, and taking more bites despite still having PO in oral cavity that she had no swallowed yet. Patient was able to reduce same when cued, but required frequent cueing.     At this time, recommend patient upgrade to MM5MT2 diet with STRICT 1:1 supervision for f/t of swallow precautions (posted HOB) and to reduce impulsivity. No straws. Crush meds in puree please.     Plan  Continue current treatment plan.    Discharge Recommendations: Recommend post-acute placement for additional speech therapy services prior to discharge home     Objective     04/08/21 1242   Precautions   Precautions Fall Risk;Swallow Precautions ( See Comments)   Vitals   O2 Delivery Device None - Room Air   Dysphagia     Positioning / Behavior Modification Self Monitoring;Multiple Swallows;Modulate Rate or Bite Size   Other Treatments PO trials of MTL via cup sip, pudding, minced/moist, and soft solids    Diet / Liquid Recommendation Minced & Moist (5) - (Dysphagia II);Mildly Thick (2) - (Nectar Thick)   Recommended Route of Medication Administration   Medication Administration  Crush all Medications in Puree   Short Term Goals   Short Term Goal # 1 4/2 revised Pt will consume LQ3/MT2 with 1-1 feeding and use of posted and recommended swallow strategies.    Goal Outcome # 1 Goal met, new goal added   Short Term Goal # 1 B  NEW 4/8: Patient will consume MM5/MT2 diet with 1:1 supervision with no overt s/sx of aspiration.    Anticipated Discharge Needs   Discharge Recommendations Recommend post-acute placement for additional speech therapy services prior to discharge home

## 2021-04-08 NOTE — PROGRESS NOTES
[facility] Medicine Daily Progress Note    Date of Service  4/8/2021    Chief Complaint  71 y.o. female admitted 3/25/2021 with metastatic endometrial cancer    Hospital Course  Briefly Ms. Desai was admitted on 3/26/2021 with complaints of chest pain, she was found to be anemic.  Additionally work-up in the emergency room included a CT scan that demonstrated a uterine mass.  The patient was admitted and gynecology was consulted.  On 3/29/2021 the patient underwent abdominal hysterectomy, salpingo-oophorectomy, and omentectomy.  On 3/30/2021 a code stroke was called for confusion and aphasia.  Subsequent imaging demonstrated a moderate sized left middle cerebral artery acute infarct.  Most recent therapy notes indicate the patient would benefit from 24-hour supervision and home health for continued therapy.       Interval Problem Update  No acute events overnight.  Determined yesterday per Dr Oliva's note, patient has capacity to select her significant other Star as POA and to discharge home in his care.  Currently awaiting [facility] availability for POA form.  Home health for PT/OT/SLP pending.    Consultants/Specialty  Gynecologic oncology  Neurology  Palliative  Psychiatry    Code Status  Full Code    Disposition  Inpatient, patient is medically cleared for discharge home. Await notary and home health service set up.    Review of Systems  Review of Systems   Respiratory: Negative for cough and shortness of breath.    Cardiovascular: Negative for chest pain.   Gastrointestinal: Negative for abdominal pain, nausea and vomiting.   Musculoskeletal: Negative for back pain and myalgias.   Neurological: Negative for dizziness and headaches.   Psychiatric/Behavioral: Negative for substance abuse. The patient does not have insomnia.         Physical Exam  Temp:  [36 °C (96.8 °F)-37 °C (98.6 °F)] 36 °C (96.8 °F)  Pulse:  [] 91  Resp:  [16-18] 18  BP: (105-144)/(64-99) 144/99  SpO2:  [92 %-99 %] 99 %    Physical  Exam  Vitals reviewed.   Constitutional:       General: She is not in acute distress.     Appearance: She is not diaphoretic.   HENT:      Head: Normocephalic and atraumatic.      Right Ear: External ear normal.      Left Ear: External ear normal.      Nose: Nose normal. No congestion.      Mouth/Throat:      Pharynx: No oropharyngeal exudate or posterior oropharyngeal erythema.   Eyes:      Extraocular Movements: Extraocular movements intact.      Pupils: Pupils are equal, round, and reactive to light.   Cardiovascular:      Rate and Rhythm: Normal rate and regular rhythm.   Pulmonary:      Effort: Pulmonary effort is normal. No respiratory distress.      Breath sounds: Normal breath sounds. No wheezing.   Abdominal:      General: Bowel sounds are normal. There is no distension.      Palpations: Abdomen is soft.      Tenderness: There is no abdominal tenderness.   Musculoskeletal:         General: No swelling. Normal range of motion.      Cervical back: Normal range of motion and neck supple.   Skin:     General: Skin is warm and dry.   Neurological:      Mental Status: She is alert.      Sensory: No sensory deficit.      Motor: No weakness.   Psychiatric:         Mood and Affect: Mood normal.         Behavior: Behavior normal.         Fluids    Intake/Output Summary (Last 24 hours) at 4/8/2021 1146  Last data filed at 4/8/2021 0000  Gross per 24 hour   Intake 120 ml   Output 1900 ml   Net -1780 ml       Laboratory  Recent Labs     04/06/21  0357 04/07/21  0404 04/08/21  0816   WBC 18.6* 20.3* 20.4*   RBC 3.29* 3.17* 3.03*   HEMOGLOBIN 8.2* 7.9* 7.7*   HEMATOCRIT 26.1* 24.9* 24.8*   MCV 79.3* 78.5* 81.8   MCH 24.9* 24.9* 25.4*   MCHC 31.4* 31.7* 31.0*   PLATELETCT 497* 495* 519*   MPV 9.1 8.6* 8.6*     Recent Labs     04/06/21  0357 04/07/21  0404 04/08/21  0816   SODIUM 138 137 135   POTASSIUM 4.1 4.7 3.9   CHLORIDE 101 100 99   CO2 25 26 29   GLUCOSE 125* 123* 117*   BUN 10 12 13   CREATININE 0.69 0.77 0.76    CALCIUM 8.7 8.6 8.6                   Imaging  EC-ECHOCARDIOGRAM COMPLETE W/ CONT   Final Result      CT-HEAD W/O   Final Result      Subacute left MCA infarct, with loss of gray-white matter differentiation in the left frontal lobe. No hemorrhagic transformation.      MR-BRAIN-WITH & W/O   Final Result   Addendum 1 of 1   Also noted in the left frontal calvarium is an area of enhancement in the    diploic space measuring approximately 11 mm which corresponds to the    location concerning for lytic lesion on the recent CT of the head. Finding    is concerning for metastatic osseous    disease.      Final      1.  Moderate-sized left middle cerebral artery acute infarct with possible early hemorrhagic transformation.   2.  Scattered punctate and small acute infarcts involving the bilateral parieto-occipital regions and left cerebellar hemisphere.   3.  There is a pattern of mild supratentorial white matter disease with scattered foci of bright T2 and FLAIR signal in the subcortical and deep white matter of both hemispheres consistent with small vessel ischemic change versus demyelination or    gliosis. Mild diffuse cerebral substance loss.   4.  Mild microangiopathic ischemic change versus demyelination or gliosis.      Attempts to convey these findings to Dr. HERNANDEZ PALOMO were initiated on 3/31/2021 10:44 AM. Findings were discussed with Dr. Palomo on 3/31/2021 at 1056 hours.      CT-CEREBRAL PERFUSION ANALYSIS   Final Result      1.  Normal study. Cerebral blood flow less than 30% likely representing completed infarct = 0 mL.      2.  T Max more than 6 seconds likely representing combination of completed infarct and ischemia = 0 mL.      3.  Mismatched volume likely representing ischemic brain/penumbra = None      4.  Please note that the cerebral perfusion was performed on the limited brain tissue around the basal ganglia region. Infarct/ischemia outside the CT perfusion sections can be missed in this  study.      CT-CTA NECK WITH & W/O-POST PROCESSING   Final Result      Beaded appearance of the cervical carotid and vertebral arteries most suggestive of fibromuscular dysplasia.      No significant stenosis or dissection.      Lytic lesion in the C7 vertebral body and the left frontal calvarium. Malignancy is concerning and must be excluded.      CT-CTA HEAD WITH & W/O-POST PROCESS   Final Result      Acute/subacute left MCA infarct.      No large vessel occlusion or hemodynamically significant stenosis.      Small lytic lesion in the left frontal calvaria concerning for malignancy.      [id number] VIEW   Final Result         1.  Nonspecific bowel gas pattern.      US-EXTREMITY VENOUS LOWER BILAT   Final Result      MR-PELVIS-WITH & W/O AND SEQUENCES   Final Result         1. Stage IV endometrial cancer. The endometrial mass itself measures 13 cm, invading through the anterior uterine wall and uterine fundus.   2. Metastatic parametrial, external iliac and para-aortic lymphadenopathy.   3. Multiple pelvic osseous metastases.   4. Several uterine fibroids.            US-PELVIC TRANSABDOMINAL ONLY   Final Result      Enlarged, heterogeneous uterus could indicate underlying fibroids though malignancy cannot be excluded.      Endometrial stripe not visualized.      CT-CTA COMPLETE THORACOABDOMINAL AORTA   Final Result      No evidence of aortic aneurysm or dissection.      Partial visualization of a markedly enlarged heterogeneous uterus with hypervascularity concerning for malignancy versus fibroids.      Diverticulosis without evidence of diverticulitis.      Bilateral hydronephrosis and hydroureter likely related to enlarged uterus.      Nonobstructive left renal stone.      Bilateral pulmonary nodules measuring up to 5 mm.      Nodule recommendations:   Low Risk: No routine follow-up      High Risk: Optional CT at 12 months      Comments: Use most suspicious nodule as guide to management. Follow-up intervals may  vary according to size and risk.      Low Risk - Minimal or absent history of smoking and of other known risk factors.      High Risk - History of smoking or of other known risk factors.      Note: These recommendations do not apply to lung cancer screening, patients with immunosuppression, or patients with known primary cancer.      Fleischner Society 2017 Guidelines for Management of Incidentally Detected Pulmonary Nodules in Adults      DX-CHEST-PORTABLE (1 VIEW)   Final Result      No acute cardiac or pulmonary abnormalities are identified.           Assessment/Plan  * Metastatic endometrial cancer (HCC)- (present on admission)  Assessment & Plan  Underwent extensive surgery on 3/29/2021 (total abdominal hysterectomy with salpingo-oophorectomy, periaortic and pelvic lymph node dissection, omentectomy, bilateral ureterolysis, peritoneal biopsies, cystoscopy repair, bilateral ureteral stent placement).  Will follow up with gyn/onc in 2 weeks as outpatient  Significant other Star able to be patient's POA    Constipation- (present on admission)  Assessment & Plan  Resolved after bowel prep pre-operative 3/29    Iron deficiency anemia due to chronic blood loss- (present on admission)  Assessment & Plan  Likely secondary to malignancy.  Transfuse as needed.    Confusion  Assessment & Plan  Improved  Patient with receptive and expressive aphasia, albeit improving  Confused after her extensive surgeries  Psychiatry consulted for capacity determination: she does not have capacity to make medical decisions  Patient does appear to have capacity to make her significant other Star POA  Notary pending to make Star POA    Hypokalemia  Assessment & Plan  Monitor and replace electrolytes as needed    Hyponatremia- (present on admission)  Assessment & Plan  Resolved.  Monitor    Peripheral edema- (present on admission)  Assessment & Plan  Improved.  Monitor    Hyperlipidemia- (present on admission)  Assessment & Plan  Continue  pravastatin    Prediabetes- (present on admission)  Assessment & Plan  Hemoglobin A1c was 6.1 in January 2020.  Monitor    Essential hypertension- (present on admission)  Assessment & Plan  Continue amlodipine.  Monitor       VTE prophylaxis: lovenox

## 2021-04-08 NOTE — PALLIATIVE CARE
Palliative Care follow-up  Appreciate updates from Dr. Oliva on discussion with pt and SO. Plan made for PC to f/u with Graciela at bedside to complete DPOA paperwork. Graciela tells PC again that she wishes for Star to be her medical decision maker. No statements completed at this time, nor alternate agents. Graciela and Star aware of notary process and plan for notarization Thursday. Email sent for notkylee.    Updated: MD/CM    Plan: notary Thursday- likely home with home health when arranged    Thank you for allowing Palliative Care to support this patient and family. Contact [phone] for additional assistance, change in patient status, or with any questions/concerns.

## 2021-04-09 ENCOUNTER — PHARMACY VISIT (OUTPATIENT)
Dept: PHARMACY | Facility: MEDICAL CENTER | Age: 71
End: 2021-04-09
Payer: COMMERCIAL

## 2021-04-09 ENCOUNTER — PATIENT OUTREACH (OUTPATIENT)
Dept: HEALTH INFORMATION MANAGEMENT | Facility: OTHER | Age: 71
End: 2021-04-09

## 2021-04-09 VITALS
RESPIRATION RATE: 17 BRPM | OXYGEN SATURATION: 100 % | DIASTOLIC BLOOD PRESSURE: 70 MMHG | HEART RATE: 98 BPM | BODY MASS INDEX: 28.18 KG/M2 | HEIGHT: 60 IN | WEIGHT: 143.52 LBS | TEMPERATURE: 98.3 F | SYSTOLIC BLOOD PRESSURE: 114 MMHG

## 2021-04-09 LAB
BACTERIA UR CULT: NORMAL
SIGNIFICANT IND 70042: NORMAL
SITE SITE: NORMAL
SOURCE SOURCE: NORMAL

## 2021-04-09 PROCEDURE — 700105 HCHG RX REV CODE 258: Performed by: STUDENT IN AN ORGANIZED HEALTH CARE EDUCATION/TRAINING PROGRAM

## 2021-04-09 PROCEDURE — 99239 HOSP IP/OBS DSCHRG MGMT >30: CPT | Performed by: STUDENT IN AN ORGANIZED HEALTH CARE EDUCATION/TRAINING PROGRAM

## 2021-04-09 PROCEDURE — 700111 HCHG RX REV CODE 636 W/ 250 OVERRIDE (IP): Performed by: STUDENT IN AN ORGANIZED HEALTH CARE EDUCATION/TRAINING PROGRAM

## 2021-04-09 PROCEDURE — A9270 NON-COVERED ITEM OR SERVICE: HCPCS | Performed by: STUDENT IN AN ORGANIZED HEALTH CARE EDUCATION/TRAINING PROGRAM

## 2021-04-09 PROCEDURE — 92507 TX SP LANG VOICE COMM INDIV: CPT

## 2021-04-09 PROCEDURE — RXMED WILLOW AMBULATORY MEDICATION CHARGE: Performed by: STUDENT IN AN ORGANIZED HEALTH CARE EDUCATION/TRAINING PROGRAM

## 2021-04-09 PROCEDURE — 700102 HCHG RX REV CODE 250 W/ 637 OVERRIDE(OP): Performed by: STUDENT IN AN ORGANIZED HEALTH CARE EDUCATION/TRAINING PROGRAM

## 2021-04-09 RX ORDER — ATORVASTATIN CALCIUM 40 MG/1
40 TABLET, FILM COATED ORAL DAILY
Qty: 30 TABLET | Refills: 0 | Status: SHIPPED | OUTPATIENT
Start: 2021-04-09 | End: 2021-05-09

## 2021-04-09 RX ORDER — ASPIRIN 81 MG/1
81 TABLET ORAL DAILY
Qty: 30 TABLET | Refills: 0 | Status: SHIPPED | OUTPATIENT
Start: 2021-04-10 | End: 2021-05-10

## 2021-04-09 RX ORDER — OXYCODONE HYDROCHLORIDE 5 MG/1
5 TABLET ORAL EVERY 6 HOURS PRN
Qty: 21 TABLET | Refills: 0 | Status: SHIPPED | OUTPATIENT
Start: 2021-04-09 | End: 2021-04-16

## 2021-04-09 RX ORDER — NITROFURANTOIN 25; 75 MG/1; MG/1
100 CAPSULE ORAL 2 TIMES DAILY
Qty: 10 CAPSULE | Refills: 0 | Status: SHIPPED | OUTPATIENT
Start: 2021-04-09 | End: 2021-04-14

## 2021-04-09 RX ORDER — AMLODIPINE BESYLATE 5 MG/1
5 TABLET ORAL DAILY
Qty: 30 TABLET | Refills: 0 | Status: SHIPPED | OUTPATIENT
Start: 2021-04-09 | End: 2021-05-09

## 2021-04-09 RX ADMIN — ASPIRIN 81 MG: 81 TABLET, COATED ORAL at 05:59

## 2021-04-09 RX ADMIN — CEFTRIAXONE SODIUM 2 G: 2 INJECTION, POWDER, FOR SOLUTION INTRAMUSCULAR; INTRAVENOUS at 05:59

## 2021-04-09 RX ADMIN — ENOXAPARIN SODIUM 30 MG: 30 INJECTION SUBCUTANEOUS at 05:59

## 2021-04-09 ASSESSMENT — PAIN DESCRIPTION - PAIN TYPE
TYPE: ACUTE PAIN
TYPE: ACUTE PAIN;CHRONIC PAIN

## 2021-04-09 NOTE — PROGRESS NOTES
"Pt previously treated over past few shifts as \"medical\" per verbal physician orders. Pt still listed as selected for tele service with neuro. On-call hospitalist contacted for clarification. Physician deferred to day-team decision regarding whether pt tele order should be changed. Pt placed back on tele at this time until order is clarified.   "

## 2021-04-09 NOTE — DISCHARGE INSTRUCTIONS
Discharge Instructions    Discharged to home by car with relative. Discharged via wheelchair, hospital escort: Yes.  Special equipment needed: Not Applicable    Be sure to schedule a follow-up appointment with your primary care doctor or any specialists as instructed.     Discharge Plan:   Diet Plan: Discussed  Activity Level: Discussed  Confirmed Follow up Appointment: Patient to Call and Schedule Appointment  Confirmed Symptoms Management: Discussed  Medication Reconciliation Updated: Yes  Influenza Vaccine Indication: Patient Refuses    I understand that a diet low in cholesterol, fat, and sodium is recommended for good health. Unless I have been given specific instructions below for another diet, I accept this instruction as my diet prescription.   Other diet: Regular diet    Special Instructions: None    · Is patient discharged on Warfarin / Coumadin?   No     Depression / Suicide Risk    As you are discharged from this [facility] facility, it is important to learn how to keep safe from harming yourself.    Recognize the warning signs:  · Abrupt changes in personality, positive or negative- including increase in energy   · Giving away possessions  · Change in eating patterns- significant weight changes-  positive or negative  · Change in sleeping patterns- unable to sleep or sleeping all the time   · Unwillingness or inability to communicate  · Depression  · Unusual sadness, discouragement and loneliness  · Talk of wanting to die  · Neglect of personal appearance   · Rebelliousness- reckless behavior  · Withdrawal from people/activities they love  · Confusion- inability to concentrate     If you or a loved one observes any of these behaviors or has concerns about self-harm, here's what you can do:  · Talk about it- your feelings and reasons for harming yourself  · Remove any means that you might use to hurt yourself (examples: pills, rope, extension cords, firearm)  · Get professional help from the community  (Mental Health, Substance Abuse, psychological counseling)  · Do not be alone:Call your Safe Contact- someone whom you trust who will be there for you.  · Call your local CRISIS HOTLINE 141-3817 or 553-522-8485  · Call your local Children's Mobile Crisis Response Team [place] (814) 882-9655 or www.[employer]  · Call the toll free National Suicide Prevention Hotlines   · National Suicide Prevention Lifeline 968-174-NBKY (0100)  · National Hope Line Network 800-SUICIDE (893-4854)    Amlodipine tablets  What is this medicine?  AMLODIPINE (am ADELIA di peen) is a calcium-channel blocker. It affects the amount of calcium found in your heart and muscle cells. This relaxes your blood vessels, which can reduce the amount of work the heart has to do. This medicine is used to lower high blood pressure. It is also used to prevent chest pain.  This medicine may be used for other purposes; ask your health care provider or pharmacist if you have questions.  COMMON BRAND NAME(S): Norvasc  What should I tell my health care provider before I take this medicine?  They need to know if you have any of these conditions:  · heart disease  · liver disease  · an unusual or allergic reaction to amlodipine, other medicines, foods, dyes, or preservatives  · pregnant or trying to get pregnant  · breast-feeding  How should I use this medicine?  Take this medicine by mouth with a glass of water. Follow the directions on the prescription label. You can take it with or without food. If it upsets your stomach, take it with food. Take your medicine at regular intervals. Do not take it more often than directed. Do not stop taking except on your doctor's advice.  Talk to your pediatrician regarding the use of this medicine in children. While this drug may be prescribed for children as young as 6 years for selected conditions, precautions do apply.  Patients over 65 years of age may have a stronger reaction and need a smaller  dose.  Overdosage: If you think you have taken too much of this medicine contact a poison control center or emergency room at once.  NOTE: This medicine is only for you. Do not share this medicine with others.  What if I miss a dose?  If you miss a dose, take it as soon as you can. If it is almost time for your next dose, take only that dose. Do not take double or extra doses.  What may interact with this medicine?  Do not take this medicine with any of the following medications:  · tranylcypromine  This medicine may also interact with the following medications:  · clarithromycin  · cyclosporine  · diltiazem  · itraconazole  · simvastatin  · tacrolimus  This list may not describe all possible interactions. Give your health care provider a list of all the medicines, herbs, non-prescription drugs, or dietary supplements you use. Also tell them if you smoke, drink alcohol, or use illegal drugs. Some items may interact with your medicine.  What should I watch for while using this medicine?  Visit your healthcare professional for regular checks on your progress. Check your blood pressure as directed. Ask your healthcare professional what your blood pressure should be and when you should contact him or her.  Do not treat yourself for coughs, colds, or pain while you are using this medicine without asking your healthcare professional for advice. Some medicines may increase your blood pressure.  You may get dizzy. Do not drive, use machinery, or do anything that needs mental alertness until you know how this medicine affects you. Do not stand or sit up quickly, especially if you are an older patient. This reduces the risk of dizzy or fainting spells. Avoid alcoholic drinks; they can make you dizzier.  What side effects may I notice from receiving this medicine?  Side effects that you should report to your doctor or health care professional as soon as possible:  · allergic reactions like skin rash, itching or hives; swelling  of the face, lips, or tongue  · fast, irregular heartbeat  · signs and symptoms of low blood pressure like dizziness; feeling faint or lightheaded, falls; unusually weak or tired  · swelling of ankles, feet, hands  Side effects that usually do not require medical attention (report these to your doctor or health care professional if they continue or are bothersome):  · dry mouth  · facial flushing  · headache  · stomach pain  · tiredness  This list may not describe all possible side effects. Call your doctor for medical advice about side effects. You may report side effects to FDA at 5-506-SMG-5220.  Where should I keep my medicine?  Keep out of the reach of children.  Store at room temperature between 59 and 86 degrees F (15 and 30 degrees C).  Throw away any unused medicine after the expiration date.  NOTE: This sheet is a summary. It may not cover all possible information. If you have questions about this medicine, talk to your doctor, pharmacist, or health care provider.  © 2020 [employer]vier/Gold Standard (2019-07-12 15:07:10)    Aspirin and Your Heart    Aspirin is a medicine that prevents the cells in the blood that are used for clotting, called platelets, from sticking together. Aspirin can be used to help reduce the risk of blood clots, heart attacks, and other heart-related problems.  Can I take aspirin?  Your health care provider will help you determine whether it is safe and beneficial for you to take aspirin daily. Taking aspirin daily may be helpful if you:  · Have had a heart attack or chest pain.  · Are at risk for a heart attack.  · Have undergone open-heart surgery, such as coronary artery bypass surgery (CABG).  · Have had coronary angioplasty or a stent.  · Have had certain types of stroke or transient ischemic attack (TIA).  · Have peripheral artery disease (PAD).  · Have chronic heart rhythm problems such as atrial fibrillation and cannot take an anticoagulant.  · Have valve disease or have had  surgery on a valve.  What are the risks?  Daily use of aspirin can cause side effects. Some of these include:  · Bleeding. Bleeding problems can be minor or serious. An example of a minor problem is a cut that does not stop bleeding. An example of a more serious problem is stomach bleeding or, rarely, bleeding into the brain. Your risk of bleeding is increased if you are also taking non-steroidal anti-inflammatory drugs (NSAIDs).  · Increased bruising.  · Upset stomach.  · An allergic reaction. People who have nasal polyps have an increased risk of developing an aspirin allergy.  General guidelines  · Take aspirin only as told by your health care provider. Make sure that you understand how much you should take and what form you should take. The two forms of aspirin are:  ? Non-enteric-coated.This type of aspirin does not have a coating and is absorbed quickly. This type of aspirin also comes in a chewable form.  ? Enteric-coated. This type of aspirin has a coating that releases the medicine very slowly. Enteric-coated aspirin might cause less stomach upset than non-enteric-coated aspirin. This type of aspirin should not be chewed or crushed.  · Limit alcohol intake to no more than 1 drink a day for nonpregnant women and 2 drinks a day for men. Drinking alcohol increases your risk of bleeding. One drink equals 12 oz of beer, 5 oz of wine, or 1½ oz of hard liquor.  Contact a health care provider if you:  · Have unusual bleeding or bruising.  · Have stomach pain or nausea.  · Have ringing in your ears.  · Have an allergic reaction that causes:  ? Hives.  ? Itchy skin.  ? Swelling of the lips, tongue, or face.  Get help right away if you:  · Notice that your bowel movements are bloody, dark red, or black in color.  · Vomit or cough up blood.  · Have blood in your urine.  · Cough, have noisy breathing (wheeze), or feel short of breath.  · Have chest pain, especially if the pain spreads to the arms, back, neck, or  jaw.  · Have a severe headache, or a headache with confusion, or dizziness.  These symptoms may represent a serious problem that is an emergency. Do not wait to see if the symptoms will go away. Get medical help right away. Call your local emergency services (911 in the [place] Do not drive yourself to the hospital.  Summary  · Aspirin can be used to help reduce the risk of blood clots, heart attacks, and other heart-related problems.  · Daily use of aspirin can increase your risk of side effects. Your health care provider will help you determine whether it is safe and beneficial for you to take aspirin daily.  · Take aspirin only as told by your health care provider. Make sure that you understand how much you can take and what form you can take.  This information is not intended to replace advice given to you by your health care provider. Make sure you discuss any questions you have with your health care provider.  Document Released: 11/30/2009 Document Revised: 10/18/2018 Document Reviewed: 10/18/2018  [employer] Patient Education © 2020 [employer]  Nitrofurantoin tablets or capsules  What is this medicine?  NITROFURANTOIN (sunshine anuja PRINCE toyn) is an antibiotic. It is used to treat urinary tract infections.  This medicine may be used for other purposes; ask your health care provider or pharmacist if you have questions.  COMMON BRAND NAME(S): Macrobid, Macrodantin, Urotoin  What should I tell my health care provider before I take this medicine?  They need to know if you have any of these conditions:  · anemia  · diabetes  · glucose-6-phosphate dehydrogenase deficiency  · kidney disease  · liver disease  · lung disease  · other chronic illness  · an unusual or allergic reaction to nitrofurantoin, other antibiotics, other medicines, foods, dyes or preservatives  · pregnant or trying to get pregnant  · breast-feeding  How should I use this medicine?  Take this medicine by mouth with a glass of water. Follow the  directions on the prescription label. Take this medicine with food or milk. Take your doses at regular intervals. Do not take your medicine more often than directed. Do not stop taking except on your doctor's advice.  Talk to your pediatrician regarding the use of this medicine in children. While this drug may be prescribed for selected conditions, precautions do apply.  Overdosage: If you think you have taken too much of this medicine contact a poison control center or emergency room at once.  NOTE: This medicine is only for you. Do not share this medicine with others.  What if I miss a dose?  If you miss a dose, take it as soon as you can. If it is almost time for your next dose, take only that dose. Do not take double or extra doses.  What may interact with this medicine?  · antacids containing magnesium trisilicate  · probenecid  · quinolone antibiotics like ciprofloxacin, lomefloxacin, norfloxacin and ofloxacin  · sulfinpyrazone  This list may not describe all possible interactions. Give your health care provider a list of all the medicines, herbs, non-prescription drugs, or dietary supplements you use. Also tell them if you smoke, drink alcohol, or use illegal drugs. Some items may interact with your medicine.  What should I watch for while using this medicine?  Tell your doctor or health care professional if your symptoms do not improve or if you get new symptoms. Drink several glasses of water a day. If you are taking this medicine for a long time, visit your doctor for regular checks on your progress.  If you are diabetic, you may get a false positive result for sugar in your urine with certain brands of urine tests. Check with your doctor.  What side effects may I notice from receiving this medicine?  Side effects that you should report to your doctor or health care professional as soon as possible:  · allergic reactions like skin rash or hives, swelling of the face, lips, or tongue  · chest  pain  · cough  · difficulty breathing  · dizziness, drowsiness  · fever or infection  · joint aches or pains  · pale or blue-tinted skin  · redness, blistering, peeling or loosening of the skin, including inside the mouth  · tingling, burning, pain, or numbness in hands or feet  · unusual bleeding or bruising  · unusually weak or tired  · yellowing of eyes or skin  Side effects that usually do not require medical attention (report to your doctor or health care professional if they continue or are bothersome):  · dark urine  · diarrhea  · headache  · loss of appetite  · nausea or vomiting  · temporary hair loss  This list may not describe all possible side effects. Call your doctor for medical advice about side effects. You may report side effects to FDA at 8-695-XDV-9355.  Where should I keep my medicine?  Keep out of the reach of children.  Store at room temperature between 15 and 30 degrees C (59 and 86 degrees F). Protect from light. Throw away any unused medicine after the expiration date.  NOTE: This sheet is a summary. It may not cover all possible information. If you have questions about this medicine, talk to your doctor, pharmacist, or health care provider.  © 2020 [employer] (2009-07-08 15:56:47)  Oxycodone tablets or capsules  What is this medicine?  OXYCODONE (ox i KOE done) is a pain reliever. It is used to treat moderate to severe pain.  This medicine may be used for other purposes; ask your health care provider or pharmacist if you have questions.  COMMON BRAND NAME(S): Dazidox, Endocodone, Oxaydo, OXECTA, OxyIR, Percolone, Roxicodone, Roxybond  What should I tell my health care provider before I take this medicine?  They need to know if you have any of these conditions:  · Parlin's disease  · brain tumor  · head injury  · heart disease  · history of drug or alcohol abuse problem  · if you often drink alcohol  · kidney disease  · liver disease  · lung or breathing disease, like  asthma  · mental illness  · pancreatic disease  · seizures  · thyroid disease  · an unusual or allergic reaction to oxycodone, codeine, hydrocodone, morphine, other medicines, foods, dyes, or preservatives  · pregnant or trying to get pregnant  · breast-feeding  How should I use this medicine?  Take this medicine by mouth with a glass of water. Follow the directions on the prescription label. You can take it with or without food. If it upsets your stomach, take it with food. Take your medicine at regular intervals. Do not take it more often than directed. Do not stop taking except on your doctor's advice.  Some brands of this medicine, like Oxecta, have special instructions. Ask your doctor or pharmacist if these directions are for you: Do not cut, crush or chew this medicine. Swallow only one tablet at a time. Do not wet, soak, or lick the tablet before you take it.  A special MedGuide will be given to you by the pharmacist with each prescription and refill. Be sure to read this information carefully each time.  Talk to your pediatrician regarding the use of this medicine in children. Special care may be needed.  Overdosage: If you think you have taken too much of this medicine contact a poison control center or emergency room at once.  NOTE: This medicine is only for you. Do not share this medicine with others.  What if I miss a dose?  If you miss a dose, take it as soon as you can. If it is almost time for your next dose, take only that dose. Do not take double or extra doses.  What may interact with this medicine?  This medicine may interact with the following medications:  · alcohol  · antihistamines for allergy, cough and cold  · antiviral medicines for HIV or AIDS  · atropine  · certain antibiotics like clarithromycin, erythromycin, linezolid, rifampin  · certain medicines for anxiety or sleep  · certain medicines for bladder problems like oxybutynin, tolterodine  · certain medicines for depression like  amitriptyline, fluoxetine, sertraline  · certain medicines for fungal infections like ketoconazole, itraconazole, voriconazole  · certain medicines for migraine headache like almotriptan, eletriptan, frovatriptan, naratriptan, rizatriptan, sumatriptan, zolmitriptan  · certain medicines for nausea or vomiting like dolasetron, ondansetron, palonosetron  · certain medicines for Parkinson's disease like benztropine, trihexyphenidyl  · certain medicines for seizures like phenobarbital, phenytoin, primidone  · certain medicines for stomach problems like dicyclomine, hyoscyamine  · certain medicines for travel sickness like scopolamine  · diuretics  · general anesthetics like halothane, isoflurane, methoxyflurane, propofol  · ipratropium  · local anesthetics like lidocaine, pramoxine, tetracaine  · MAOIs like Carbex, Eldepryl, Marplan, Nardil, and Parnate  · medicines that relax muscles for surgery  · methylene blue  · nilotinib  · other narcotic medicines for pain or cough  · phenothiazines like chlorpromazine, mesoridazine, prochlorperazine, thioridazine  This list may not describe all possible interactions. Give your health care provider a list of all the medicines, herbs, non-prescription drugs, or dietary supplements you use. Also tell them if you smoke, drink alcohol, or use illegal drugs. Some items may interact with your medicine.  What should I watch for while using this medicine?  Tell your doctor or health care professional if your pain does not go away, if it gets worse, or if you have new or a different type of pain. You may develop tolerance to the medicine. Tolerance means that you will need a higher dose of the medicine for pain relief. Tolerance is normal and is expected if you take this medicine for a long time.  Do not suddenly stop taking your medicine because you may develop a severe reaction. Your body becomes used to the medicine. This does NOT mean you are addicted. Addiction is a behavior related  to getting and using a drug for a non-medical reason. If you have pain, you have a medical reason to take pain medicine. Your doctor will tell you how much medicine to take. If your doctor wants you to stop the medicine, the dose will be slowly lowered over time to avoid any side effects.  There are different types of narcotic medicines (opiates). If you take more than one type at the same time or if you are taking another medicine that also causes drowsiness, you may have more side effects. Give your health care provider a list of all medicines you use. Your doctor will tell you how much medicine to take. Do not take more medicine than directed. Call emergency for help if you have problems breathing or unusual sleepiness.  You may get drowsy or dizzy. Do not drive, use machinery, or do anything that needs mental alertness until you know how the medicine affects you. Do not stand or sit up quickly, especially if you are an older patient. This reduces the risk of dizzy or fainting spells. Alcohol may interfere with the effect of this medicine. Avoid alcoholic drinks.  This medicine will cause constipation. Try to have a bowel movement at least every 2 to 3 days. If you do not have a bowel movement for 3 days, call your doctor or health care professional.  Your mouth may get dry. Chewing sugarless gum or sucking hard candy, and drinking plenty of water may help. Contact your doctor if the problem does not go away or is severe.  What side effects may I notice from receiving this medicine?  Side effects that you should report to your doctor or health care professional as soon as possible:  · allergic reactions like skin rash, itching or hives, swelling of the face, lips, or tongue  · breathing problems  · confusion  · signs and symptoms of low blood pressure like dizziness; feeling faint or lightheaded, falls; unusually weak or tired  · trouble passing urine or change in the amount of urine  · trouble swallowing  Side  effects that usually do not require medical attention (report to your doctor or health care professional if they continue or are bothersome):  · constipation  · dry mouth  · nausea, vomiting  · tiredness  This list may not describe all possible side effects. Call your doctor for medical advice about side effects. You may report side effects to FDA at 1-595-HIZ-5543.  Where should I keep my medicine?  Keep out of the reach of children. This medicine can be abused. Keep your medicine in a safe place to protect it from theft. Do not share this medicine with anyone. Selling or giving away this medicine is dangerous and against the law.  Store at room temperature between 15 and 30 degrees C (59 and 86 degrees F). Protect from light. Keep container tightly closed.  This medicine may cause harm and death if it is taken by other adults, children, or pets. Return medicine that has not been used to an official disposal site. Contact the [facility] at [phone] or your [facility] government to find a site. If you cannot return the medicine, flush it down the toilet. Do not use the medicine after the expiration date.  NOTE: This sheet is a summary. It may not cover all possible information. If you have questions about this medicine, talk to your doctor, pharmacist, or health care provider.  © 2020 [employer] (2018-04-24 16:13:10)  Atorvastatin tablets  What is this medicine?  ATORVASTATIN (a TORE va sta tin) is known as a HMG-CoA reductase inhibitor or 'statin'. It lowers the level of cholesterol and triglycerides in the blood. This drug may also reduce the risk of heart attack, stroke, or other health problems in patients with risk factors for heart disease. Diet and lifestyle changes are often used with this drug.  This medicine may be used for other purposes; ask your health care provider or pharmacist if you have questions.  COMMON BRAND NAME(S): Lipitor  What should I tell my health care provider before I take  this medicine?  They need to know if you have any of these conditions:  · diabetes  · if you often drink alcohol  · history of stroke  · kidney disease  · liver disease  · muscle aches or weakness  · thyroid disease  · an unusual or allergic reaction to atorvastatin, other medicines, foods, dyes, or preservatives  · pregnant or trying to get pregnant  · breast-feeding  How should I use this medicine?  Take this medicine by mouth with a glass of water. Follow the directions on the prescription label. You can take it with or without food. If it upsets your stomach, take it with food. Do not take with grapefruit juice. Take your medicine at regular intervals. Do not take it more often than directed. Do not stop taking except on your doctor's advice.  Talk to your pediatrician regarding the use of this medicine in children. While this drug may be prescribed for children as young as 10 for selected conditions, precautions do apply.  Overdosage: If you think you have taken too much of this medicine contact a poison control center or emergency room at once.  NOTE: This medicine is only for you. Do not share this medicine with others.  What if I miss a dose?  If you miss a dose, take it as soon as you can. If your next dose is to be taken in less than 12 hours, then do not take the missed dose. Take the next dose at your regular time. Do not take double or extra doses.  What may interact with this medicine?  Do not take this medicine with any of the following medications:  · dasabuvir; ombitasvir; paritaprevir; ritonavir  · ombitasvir; paritaprevir; ritonavir  · posaconazole  · red yeast rice  This medicine may also interact with the following medications:  · alcohol  · birth control pills  · certain antibiotics like erythromycin and clarithromycin  · certain antivirals for HIV or hepatitis  · certain medicines for cholesterol like fenofibrate, gemfibrozil, and niacin  · certain medicines for fungal infections like  ketoconazole and itraconazole  · colchicine  · cyclosporine  · digoxin  · grapefruit juice  · rifampin  This list may not describe all possible interactions. Give your health care provider a list of all the medicines, herbs, non-prescription drugs, or dietary supplements you use. Also tell them if you smoke, drink alcohol, or use illegal drugs. Some items may interact with your medicine.  What should I watch for while using this medicine?  Visit your doctor or health care professional for regular check-ups. You may need regular tests to make sure your liver is working properly.  Your health care professional may tell you to stop taking this medicine if you develop muscle problems. If your muscle problems do not go away after stopping this medicine, contact your health care professional.  Do not become pregnant while taking this medicine. Women should inform their health care professional if they wish to become pregnant or think they might be pregnant. There is a potential for serious side effects to an unborn child. Talk to your health care professional or pharmacist for more information. Do not breast-feed an infant while taking this medicine.  This medicine may increase blood sugar. Ask your healthcare provider if changes in diet or medicines are needed if you have diabetes.  If you are going to need surgery or other procedure, tell your doctor that you are using this medicine.  This drug is only part of a total heart-health program. Your doctor or a dietician can suggest a low-cholesterol and low-fat diet to help. Avoid alcohol and smoking, and keep a proper exercise schedule.  This medicine may cause a decrease in Co-Enzyme Q-10. You should make sure that you get enough Co-Enzyme Q-10 while you are taking this medicine. Discuss the foods you eat and the vitamins you take with your health care professional.  What side effects may I notice from receiving this medicine?  Side effects that you should report to your  doctor or health care professional as soon as possible:  · allergic reactions like skin rash, itching or hives, swelling of the face, lips, or tongue  · fever  · joint pain  · loss of memory  · redness, blistering, peeling or loosening of the skin, including inside the mouth  · signs and symptoms of high blood sugar such as being more thirsty or hungry or having to urinate more than normal. You may also feel very tired or have blurry vision.  · signs and symptoms of liver injury like dark yellow or brown urine; general ill feeling or flu-like symptoms; light-belly pain; unusually weak or tired; yellowing of the eyes or skin  · signs and symptoms of muscle injury like dark urine; trouble passing urine or change in the amount of urine; unusually weak or tired; muscle pain or side or back pain  Side effects that usually do not require medical attention (report to your doctor or health care professional if they continue or are bothersome):  · diarrhea  · nausea  · stomach pain  · trouble sleeping  · upset stomach  This list may not describe all possible side effects. Call your doctor for medical advice about side effects. You may report side effects to FDA at 1-894-NEU-1411.  Where should I keep my medicine?  Keep out of the reach of children.  Store between 20 and 25 degrees C (68 and 77 degrees F). Throw away any unused medicine after the expiration date.  NOTE: This sheet is a summary. It may not cover all possible information. If you have questions about this medicine, talk to your doctor, pharmacist, or health care provider.  © 2020 [employer]vier/Gold Standard (2019-10-09 11:36:16)    Indwelling Urinary Catheter Care, Adult  An indwelling urinary catheter is a thin, flexible, germ-free (sterile) tube that is placed into the bladder to help drain urine out of the body. The catheter is inserted into the part of the body that drains urine from the bladder (urethra). Urine drains from the catheter into a drainage bag  outside of the body.  Taking good care of your catheter will keep it working properly and help to prevent problems from developing.  What are the risks?  · Bacteria may get into your bladder and cause a urinary tract infection.  · Urine flow can become blocked. This can happen if the catheter is not working correctly, or if you have sediment or a blood clot in your bladder or the catheter.  · Tissue near the catheter may become irritated and bleed.  How to wear your catheter and your drainage bag  Supplies needed  · Adhesive tape or a leg strap.  · Alcohol wipe or soap and water (if you use tape).  · A clean towel (if you use tape).  · Overnight drainage bag.  · Smaller drainage bag (leg bag).  Wearing your catheter and bag  Use adhesive tape or a leg strap to attach your catheter to your leg.  · Make sure the catheter is not pulled tight.  · If a leg strap gets wet, replace it with a dry one.  · If you use adhesive tape:  1. Use an alcohol wipe or soap and water to wash off any stickiness on your skin where you had tape before.  2. Use a clean towel to pat-dry the area.  3. Apply the new tape.  You should have received a large overnight drainage bag and a smaller leg bag that fits underneath clothing.  · You may wear the overnight bag at any time, but you should not wear the leg bag at night.  · Always wear the leg bag below your knee.  · Make sure the overnight drainage bag is always lower than the level of your bladder, but do not let it touch the floor. Before you go to sleep, hang the bag inside a wastebasket that is covered by a clean plastic bag.  How to care for your skin around the catheter         Supplies needed  · A clean washcloth.  · Water and mild soap.  · A clean towel.  Caring for your skin and catheter  · Every day, use a clean washcloth and soapy water to clean the skin around your catheter.  ? Wash your hands with soap and water.  ? Wet a washcloth in warm water and mild soap.  ? Clean the skin  around your urethra.  ? If you are female:  ? Use one hand to gently spread the folds of skin around your vagina (labia).  ? With the washcloth in your other hand, wipe the inner side of your labia on each side. Do this in a front-to-back direction.  ? If you are male:  ? Use one hand to pull back any skin that covers the end of your penis (foreskin).  ? With the washcloth in your other hand, wipe your penis in small circles. Start wiping at the tip of your penis, then move outward from the catheter.  ? Move the foreskin back in place, if this applies.  ? With your free hand, hold the catheter close to where it enters your body. Keep holding the catheter during cleaning so it does not get pulled out.  ? Use your other hand to clean the catheter with the washcloth.  ? Only wipe downward on the catheter.  ? Do not wipe upward toward your body, because that may push bacteria into your urethra and cause infection.  ? Use a clean towel to pat-dry the catheter and the skin around it. Make sure to wipe off all soap.  ? Wash your hands with soap and water.  · Shower every day. Do not take baths.  · Do not use cream, ointment, or lotion on the area where the catheter enters your body, unless your health care provider tells you to do that.  · Do not use powders, sprays, or lotions on your genital area.  · Check your skin around the catheter every day for signs of infection. Check for:  ? Redness, swelling, or pain.  ? Fluid or blood.  ? Warmth.  ? Pus or a bad smell.  How to empty the drainage bag  Supplies needed  · Rubbing alcohol.  · Gauze pad or cotton ball.  · Adhesive tape or a leg strap.  Emptying the bag  Empty your drainage bag (your overnight drainage bag or your leg bag) when it is ?-½ full, or at least 2-3 times a day. Clean the drainage bag according to the 's instructions or as told by your health care provider.  1. Wash your hands with soap and water.  2. Detach the drainage bag from your  leg.  3. Hold the drainage bag over the toilet or a clean container. Make sure the drainage bag is lower than your hips and bladder. This stops urine from going back into the tubing and into your bladder.  4. Open the pour spout at the bottom of the bag.  5. Empty the urine into the toilet or container. Do not let the pour spout touch any surface. This precaution is important to prevent bacteria from getting in the bag and causing infection.  6. Apply rubbing alcohol to a gauze pad or cotton ball.  7. Use the gauze pad or cotton ball to clean the pour spout.  8. Close the pour spout.  9. Attach the bag to your leg with adhesive tape or a leg strap.  10. Wash your hands with soap and water.  How to change the drainage bag  Supplies needed:  · Alcohol wipes.  · A clean drainage bag.  · Adhesive tape or a leg strap.  Changing the bag  Replace your drainage bag with a clean bag if it leaks, starts to smell bad, or looks dirty.  1. Wash your hands with soap and water.  2. Detach the dirty drainage bag from your leg.  3. Pinch the catheter with your fingers so that urine does not spill out.  4. Disconnect the catheter tube from the drainage tube at the connection valve. Do not let the tubes touch any surface.  5. Clean the end of the catheter tube with an alcohol wipe. Use a different alcohol wipe to clean the end of the drainage tube.  6. Connect the catheter tube to the drainage tube of the clean bag.  7. Attach the clean bag to your leg with adhesive tape or a leg strap. Avoid attaching the new bag too tightly.  8. Wash your hands with soap and water.  General instructions    · Never pull on your catheter or try to remove it. Pulling can damage your internal tissues.  · Always wash your hands before and after you handle your catheter or drainage bag. Use a mild, fragrance-free soap. If soap and water are not available, use hand .  · Always make sure there are no twists or bends (kinks) in the catheter  tube.  · Always make sure there are no leaks in the catheter or drainage bag.  · Drink enough fluid to keep your urine pale yellow.  · Do not take baths, swim, or use a hot tub.  · If you are female, wipe from front to back after having a bowel movement.  Contact a health care provider if:  · Your urine is cloudy.  · Your urine smells unusually bad.  · Your catheter gets clogged.  · Your catheter starts to leak.  · Your bladder feels full.  Get help right away if:  · You have redness, swelling, or pain where the catheter enters your body.  · You have fluid, blood, pus, or a bad smell coming from the area where the catheter enters your body.  · The area where the catheter enters your body feels warm to the touch.  · You have a fever.  · You have pain in your abdomen, legs, lower back, or bladder.  · You see blood in the catheter.  · Your urine is pink or red.  · You have nausea, vomiting, or chills.  · Your urine is not draining into the bag.  · Your catheter gets pulled out.  Summary  · An indwelling urinary catheter is a thin, flexible, germ-free (sterile) tube that is placed into the bladder to help drain urine out of the body.  · The catheter is inserted into the part of the body that drains urine from the bladder (urethra).  · Take good care of your catheter to keep it working properly and help prevent problems from developing.  · Always wash your hands before and after you handle your catheter or drainage bag.  · Never pull on your catheter or try to remove it.  This information is not intended to replace advice given to you by your health care provider. Make sure you discuss any questions you have with your health care provider.  Document Released: 12/18/2006 Document Revised: 04/10/2020 Document Reviewed: 08/03/2018  Elsevier Patient Education © 2020 [employer]

## 2021-04-09 NOTE — PROGRESS NOTES
Surgical Progress Note    Author: Shonda Quinteros M.D.      Interval Events:  Resting comfortably.  Responds to questions appropriately.  Denies complaints.  Notes pain control adequate.  Tolerating p.o. with return of bowel function.  Ambulating with walker.  Persistent leukocytosis with no evidence of active infection.  Awaiting home health for discharge.    Hemodynamics:  Temp (24hrs), Av.8 °C (98.2 °F), Min:36.6 °C (97.8 °F), Max:37.1 °C (98.7 °F)  Temperature: 37.1 °C (98.7 °F)  Pulse  Av.6  Min: 62  Max: 120   Blood Pressure : 111/63     Respiratory:    Respiration: 16, Pulse Oximetry: 100 %        RUL Breath Sounds: Clear, RML Breath Sounds: Diminished, RLL Breath Sounds: Diminished, KAREN Breath Sounds: Clear, LLL Breath Sounds: Diminished  Neuro:  GCS       Fluids:    Intake/Output Summary (Last 24 hours) at 3/31/2021 1521  Last data filed at 3/31/2021 0700  Gross per 24 hour   Intake 2180.88 ml   Output 2780 ml   Net -599.12 ml     Weight: 65.1 kg (143 lb 8.3 oz)  Current Diet Order   Procedures   • Diet Order Diet: Level 5 - Minced and Moist (Crush meds in puree please ); Liquid level: Level 2 - Mildly Thick; Tray Modifications (optional): SLP - OK per SLP, SLP - 1:1 Supervision by Nursing, No Straws     Physical Exam   Constitutional: She appears well-developed.   HENT:   Head: Normocephalic.   Cardiovascular: Normal rate.   Pulmonary/Chest: Effort normal.   Abdominal: Soft. She exhibits no distension. There is no abdominal tenderness.   VML incision c/d/i w/ SQ suture, LLQ drain w/ minimal serous output   Genitourinary:    Genitourinary Comments: Beverly draining clear urine     Musculoskeletal:         General: Edema present.   Neurological: She is alert.     Labs:  No results found for this or any previous visit (from the past 24 hour(s)).  Medical Decision Making, by Problem:  Active Hospital Problems    Diagnosis    • Metastatic endometrial cancer (HCC) [C54.1]      Priority: High   •  Constipation [K59.00]      Priority: Medium   • Iron deficiency anemia due to chronic blood loss [D50.0]      Priority: Medium   • Hypokalemia [E87.6]      Priority: Low   • Hyponatremia [E87.1]      Priority: Low   • Peripheral edema [R60.9]      Priority: Low   • Hyperlipidemia [E78.5]      Priority: Low   • Essential hypertension [I10]      Priority: Low   • Prediabetes [R73.03]      Priority: Low   • Confusion [R41.0]      Plan:  72 y/o found to have a endometrial mass now s/p MACKENZIE/BSO/PALND/PLND/omentectomy/bilateral uretrolysis/peritoneal biopsies/cystotomy repair/bilateral ureteral stent placement on 3/29/21:      1. POD #11:  improving clinically status post postoperative stroke, minced diet per SLP recs, KATIUSKA drain serosang and minimal, encourage ambulation, Needs stents x6 weeks, dunn x3 weeks.   2. Expressive aphasia: L MCA infarct, cardiothrombotic event, appreciate neurology assistance, repeat CTH w/ no change 3/31, on ASA, verbalization improving, comprehension remains limited, SO Star now DPOA  3. Post op pain: Well-controlled, percocet prn  4. Anemia: acute on chronic, s/p 2 units intraop, hg 11 >> 7.7 today, CTM and transfuse as indicated  5. Leukocytosis: Persistent, afebrile, on ceftriaxone for presumed UTI, negative urine culture, suspect related to malignancy  6. Transaminitis: improving, NH4 wnl, suspect d/t shock, no tylenol  7. Stage IVB uterine carcinosarcoma: surgical findings and details d/w patient and boyfriend, final pathology reveals metastatic carcinosarcoma. I had a long discussion with the patient and her partner 4/3/21 regarding diagnosis, prognosis, and potential treatment options. SO Star made aware of poor prognosis w/ advanced metastatic disease. Discussed palliative chemotherapy, regimen, and potential side effects vs. palliative care and/or Hospice pending functional status and goals of care.   8. Dysphagia: s/p SLP consult and FEES study    9. Debilitation: PT/OT  10.  GI/FEN: Minced diet, monitor lytes and replete PRN  11. Ppx: SCDs, ASA, lovenox  12. Dispo: continue inpatient management for postop care, appears stable for discharge from surgical standpoint, home health pending, order for cystogram and follow-up appointment placed through our office

## 2021-04-09 NOTE — DISCHARGE PLANNING
Agency/Facility Name: [facility]   Outcome: Left vmail regarding referral    @[phone]  Agency/Facility Name: [facility]  Spoke To: Intake  Outcome: Intake stated that pt would need to have nursing added to the HH order to accommodate dunn care. Intake requested a d/c summary as well    RNCM Jaen Blake notified  @[id number]  Agency/Facility Name: [facility]  Spoke To: Danuta  Outcome: Danuta stated that pt is accepted and to notify her when pt d/c's      Agency/Facility Name: [facility]  Spoke To: Danuta  Outcome: Stated to Danuta that pt is going to d/c today and that this DPA faxed d/c summary

## 2021-04-09 NOTE — DISCHARGE PLANNING
Anticipated Discharge Disposition: home with [facility]    Action: RN CM called patient's SO and DPOA, Star, to give 2nd IMM via phone. RN CM also updated him on [facility] accepting patient.    Barriers to Discharge: none    Plan: Patient to discharge home today

## 2021-04-09 NOTE — PROGRESS NOTES
LDL not ordered per stroke protocol as stroke likely result of post surgical complication. LDL level would likely not change plan of care given setting of metastatic cancer. Patient to continue aspirin and statin therapy.

## 2021-04-09 NOTE — CARE PLAN
Problem: Communication  Goal: The ability to communicate needs accurately and effectively will improve  Outcome: PROGRESSING AS EXPECTED     Problem: Pain Management  Goal: Pain level will decrease to patient's comfort goal  Outcome: PROGRESSING AS EXPECTED     Problem: Venous Thromboembolism (VTW)/Deep Vein Thrombosis (DVT) Prevention:  Goal: Patient will participate in Venous Thrombosis (VTE)/Deep Vein Thrombosis (DVT)Prevention Measures  4/9/2021 0249 by Stacey Goldman R.N.  Outcome: PROGRESSING AS EXPECTED     Problem: Skin Integrity  Goal: Risk for impaired skin integrity will decrease  Outcome: PROGRESSING AS EXPECTED     Problem: Safety  Goal: Free from accidental injury  Outcome: PROGRESSING AS EXPECTED     Problem: Risk of Aspiration  Goal: Absence of aspiration  Outcome: PROGRESSING AS EXPECTED

## 2021-04-09 NOTE — DISCHARGE SUMMARY
Discharge Summary    CHIEF COMPLAINT ON ADMISSION  Chief Complaint   Patient presents with   • Chest Pain     mid epigastric   • Back Pain     radiating from right scapula to lower back       Reason for Admission  EMS     Admission Date  3/25/2021    CODE STATUS  Full Code    HPI & HOSPITAL COURSE  Ms. Desai is a pleasant 72 y/o  female with a PMHx of HTN, vaginal bleeding, who presented to the ED on 3/25/2021 with complaints of chest and back pain, progressive weakness, and dyspnea on exertion. She was found to be anemic with low hemoglobin, transfused 1 unit red blood cells. CT scan performed showing enlarged heterogeneous uterus. MRI showed uterine mass with invasion through anterior uterine wall, as well as iliac and para aortic lymphadenopathy. Patient underwent abdominal hysterectomy, salpingo oophorectomy, omentectomy, peritoneal biopsies; as well as cystotomy repair and bilateral ureteral stent placement. 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 Tumor markers pending. 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 Patient to have urtereral stents for 6 weeks, as well as dunn for 3 weeks. Patient to follow up with Dr Quinteros gynecology/oncology in 2 weeks. Patient with expressive aphasia due to stroke, improving by time of discharge. Patient is to continue physical, occupational, and speech therapy services with home health. She has 24/7 supervision from significant other Star. Patient lacking capacity per psychiatry consult to make decisions regarding medical care, but patient did however have capacity to make her significant other power of  and medical decision maker. Therefore patient is discharged to home with Star, to continue with home health services and follow up with gynecology/oncology in 2 weeks for continued cancer management.    Therefore, she is discharged in fair and stable condition to home with close  outpatient follow-up.    The patient met 2-midnight criteria for an inpatient stay at the time of discharge.    Discharge Date  4/9/2021    FOLLOW UP ITEMS POST DISCHARGE  Take medications as prescribed.  Follow up with gynecology/oncology.    DISCHARGE DIAGNOSES  Principal Problem:    Metastatic endometrial cancer (HCC) POA: Yes  Active Problems:    Iron deficiency anemia due to chronic blood loss POA: Yes    Constipation POA: Yes    Confusion POA: Unknown    Essential hypertension POA: Yes    Prediabetes POA: Yes    Hyperlipidemia POA: Yes    Peripheral edema POA: Yes    Hyponatremia POA: Yes    Hypokalemia POA: No  Resolved Problems:    Cystitis POA: Clinically Undetermined    Stage 3 chronic kidney disease POA: Yes      FOLLOW UP  Future Appointments   Date Time Provider Department Center   4/15/2021  2:00 PM Shan Tuttle M.D. [facility] LELAND Quinteros M.D.  [address] [place] [address]  [phone]      Please call to schedule a hospital follow up. Thank you    UROLOGY [address] [address]  [phone]          MEDICATIONS ON DISCHARGE     Medication List      START taking these medications      Instructions   amLODIPine 5 MG Tabs  Commonly known as: NORVASC   Take 1 tablet by mouth every day for 30 days.  Dose: 5 mg     aspirin 81 MG EC tablet  Start taking on: April 10, 2021   Take 1 tablet by mouth every day for 30 days.  Dose: 81 mg     atorvastatin 40 MG Tabs  Commonly known as: LIPITOR   Take 1 tablet by mouth every day for 30 days.  Dose: 40 mg     nitrofurantoin 100 MG Caps  Commonly known as: MACROBID   Take 1 capsule by mouth 2 times a day for 5 days.  Dose: 100 mg     oxyCODONE immediate-release 5 MG Tabs  Commonly known as: ROXICODONE   Take 1 tablet by mouth every 6 hours as needed for Severe Pain for up to 7 days.  Dose: 5 mg        STOP taking these medications    hydrochlorothiazide 12.5 MG capsule  Commonly known as:  MICROZIDE     losartan 50 MG Tabs  Commonly known as: COZAAR     pravastatin 10 MG Tabs  Commonly known as: PRAVACHOL            Allergies  No Known Allergies    DIET  Orders Placed This Encounter   Procedures   • Diet Order Diet: Level 5 - Minced and Moist (Crush meds in puree please ); Liquid level: Level 2 - Mildly Thick; Tray Modifications (optional): SLP - OK per SLP, SLP - 1:1 Supervision by Nursing, No Straws     Standing Status:   Standing     Number of Occurrences:   1     Order Specific Question:   Diet:     Answer:   Level 5 - Minced and Moist [24]     Comments:   Crush meds in puree please      Order Specific Question:   Liquid level     Answer:   Level 2 - Mildly Thick     Order Specific Question:   Tray Modifications (optional)     Answer:   SLP - OK per SLP     Order Specific Question:   Tray Modifications (optional)     Answer:   SLP - 1:1 Supervision by Nursing     Order Specific Question:   Tray Modifications (optional)     Answer:   No Straws       ACTIVITY  As tolerated.  Weight bearing as tolerated    CONSULTATIONS  Gynecology/oncology  Neurology  Palliative  Psychiatry    PROCEDURES  As above in summary.    LABORATORY  Lab Results   Component Value Date    SODIUM 135 04/08/2021    POTASSIUM 3.9 04/08/2021    CHLORIDE 99 04/08/2021    CO2 29 04/08/2021    GLUCOSE 117 (H) 04/08/2021    BUN 13 04/08/2021    CREATININE 0.76 04/08/2021        Lab Results   Component Value Date    WBC 20.4 (H) 04/08/2021    HEMOGLOBIN 7.7 (L) 04/08/2021    HEMATOCRIT 24.8 (L) 04/08/2021    PLATELETCT 519 (H) 04/08/2021        Total time of the discharge process exceeds 38 minutes.

## 2021-04-09 NOTE — PROGRESS NOTES
Gynecology Oncology Progress Note               Author: Diann Justice P.A.-C. Date & Time created: 4/8/2021  5:33 PM     Interval History:  Patient up in chair, s/o Star at bedside.  No pain, no nausea or vomiting, tolerating diet.  BM today, up and walking with PT/OT today.     Physical Exam:  Physical Exam  HENT:      Head: Normocephalic and atraumatic.      Mouth/Throat:      Mouth: Mucous membranes are moist.   Cardiovascular:      Rate and Rhythm: Tachycardia present.      Pulses: Normal pulses.      Heart sounds: Normal heart sounds.   Pulmonary:      Effort: Pulmonary effort is normal.      Breath sounds: Normal breath sounds.   Abdominal:      General: Abdomen is flat. There is no distension.      Palpations: Abdomen is soft.      Tenderness: There is no abdominal tenderness.      Comments: midline incision healing well,c/d/i, LLQ drain w/ minimal serous output    Skin:     General: Skin is warm and dry.   Neurological:      Mental Status: She is alert.         Labs:          Recent Labs     04/06/21 0357 04/07/21 0404 04/08/21  0816   SODIUM 138 137 135   POTASSIUM 4.1 4.7 3.9   CHLORIDE 101 100 99   CO2 25 26 29   BUN 10 12 13   CREATININE 0.69 0.77 0.76   CALCIUM 8.7 8.6 8.6     Recent Labs     04/06/21 0357 04/07/21  0404 04/08/21  0816   ALTSGPT 137* 126* 91*   ASTSGOT 82* 81* 45   ALKPHOSPHAT 267* 265* 250*   TBILIRUBIN 0.2 0.2 <0.2   GLUCOSE 125* 123* 117*     Recent Labs     04/06/21 0357 04/07/21  0404 04/08/21  0816   RBC 3.29* 3.17* 3.03*   HEMOGLOBIN 8.2* 7.9* 7.7*   HEMATOCRIT 26.1* 24.9* 24.8*   PLATELETCT 497* 495* 519*     Recent Labs     04/06/21 0357 04/07/21  0404 04/08/21  0816   WBC 18.6* 20.3* 20.4*   NEUTSPOLYS 80.00* 81.60* 85.00*   LYMPHOCYTES 8.00* 8.30* 6.70*   MONOCYTES 7.00 5.60 5.40   EOSINOPHILS 1.80 1.40 1.20   BASOPHILS 0.30 0.50 0.40   ASTSGOT 82* 81* 45   ALTSGPT 137* 126* 91*   ALKPHOSPHAT 267* 265* 250*   TBILIRUBIN 0.2 0.2 <0.2     Recent Labs      21  0357 21  0404 21  0816   SODIUM 138 137 135   POTASSIUM 4.1 4.7 3.9   CHLORIDE 101 100 99   CO2 25 26 29   GLUCOSE 125* 123* 117*   BUN 10 12 13   CREATININE 0.69 0.77 0.76   CALCIUM 8.7 8.6 8.6     Hemodynamics:  Temp (24hrs), Av.5 °C (97.7 °F), Min:36 °C (96.8 °F), Max:37 °C (98.6 °F)  Temperature: 36 °C (96.8 °F)  Pulse  Av.5  Min: 62  Max: 120   Blood Pressure : 144/99     Respiratory:    Respiration: 18, Pulse Oximetry: 99 %        RUL Breath Sounds: Clear, RML Breath Sounds: Diminished, RLL Breath Sounds: Diminished, KAREN Breath Sounds: Clear, LLL Breath Sounds: Diminished  Fluids:    Intake/Output Summary (Last 24 hours) at 2021 1802  Last data filed at 2021 1738  Gross per 24 hour   Intake 480 ml   Output 3800 ml   Net -3320 ml     Weight: 65 kg (143 lb 4.8 oz)  GI/Nutrition:  Orders Placed This Encounter   Procedures   • Diet Order Diet: Level 5 - Minced and Moist (Crush meds in puree please ); Liquid level: Level 2 - Mildly Thick; Tray Modifications (optional): SLP - OK per SLP, SLP - 1:1 Supervision by Nursing, No Straws     Standing Status:   Standing     Number of Occurrences:   1     Order Specific Question:   Diet:     Answer:   Level 5 - Minced and Moist [24]     Comments:   Crush meds in puree please      Order Specific Question:   Liquid level     Answer:   Level 2 - Mildly Thick     Order Specific Question:   Tray Modifications (optional)     Answer:   SLP - OK per SLP     Order Specific Question:   Tray Modifications (optional)     Answer:   SLP - 1:1 Supervision by Nursing     Order Specific Question:   Tray Modifications (optional)     Answer:   No Straws     Medical Decision Making, by Problem:  Active Hospital Problems    Diagnosis    • *Metastatic endometrial cancer (HCC) [C54.1]    • Constipation [K59.00]    • Iron deficiency anemia due to chronic blood loss [D50.0]    • Hypokalemia [E87.6]    • Hyponatremia [E87.1]    • Peripheral edema [R60.9]    •  Hyperlipidemia [E78.5]    • Essential hypertension [I10]    • Prediabetes [R73.03]    • Confusion [R41.0]        Plan:  72 y/o found to have a endometrial mass now s/p MACKENZIE/BSO/PALND/PLND/omentectomy/bilateral uretrolysis/peritoneal biopsies/cystotomy repair/bilateral ureteral stent placement on 3/29/21:      1. POD #9: Tolerating thickened liquid diet w/ advancement pending SLP recs, KATIUSKA drain with minimal serous, +ROBF, encourage ambulation, Needs stents x6 weeks, dunn x3 weeks, will follow up with Dr. Quinteros in 2 weeks with a cystogram prior to her appointment.  2. Expressive aphasia: L MCA infarct, cardiothrombotic event, appreciate neurology assistance, repeat CTH w/ no change 3/31, on ASA, verbalization improving, comprehension limited   3. Post op pain: percocet prn  4. Anemia: acute on chronic, s/p 2 units intraop, stable, asymptomatic, 7.7 today, CTM and transfuse as indicated  5. Leukocytosis: stable 20.4, remains afebrile. UA was turbid with large leuk esterase, on Rocephin for UTI per hospitalist. Culture no growth to date, will CTM.   6. Transaminitis: improving, NH4 wnl, suspect d/t shock, no tylenol  7. Stage IVB uterine carcinosarcoma: surgical findings and details d/w patient and boyfriend, final pathology reveals metastatic carcinosarcoma. Dr. Quinteros discussed with the patient and her partner on 4/3/21 regarding diagnosis, prognosis, and potential treatment options. SO Star made aware of poor prognosis w/ advanced metastatic disease. Discussed palliative chemotherapy, regimen, and potential side effects vs. palliative care and/or Hospice pending functional status and goals of care.   8. Dysphagia: s/p SLP consult and FEES study    9. Debilitation: PT/OT  10. GI/FEN:Diet advanced per SLP, monitor lytes and replete PRN  11. Ppx: SCDs, ASA, lovenox  12. Dispo: Case discussed with Dr. Quinteros, patient may be cleared for discharge from a surgical standpoint, recommend continue to monitor response to  antibiotics and leukocytosis.      Quality-Core Measures

## 2021-04-09 NOTE — PROGRESS NOTES
Pt dc'd to discharge lounge. IV, KATIUSKA drain and monitor removed; monitor room notified. Pt left unit via wheelchair with CNA. Personal belongings with pt when leaving unit.

## 2021-04-09 NOTE — THERAPY
Speech Language Pathology  Daily Treatment     Patient Name: Graciela Desai  Age:  71 y.o., Sex:  female  Medical Record #: [id number]  Today's Date: 2021     Precautions  Precautions: Fall Risk, Swallow Precautions ( See Comments)  Comments: acute CVA, LMC bilateral parieto-occipital left cerebellar    Assessment    Pt seen on this date for aphasia therapy. Pt found in bed alert with SO in the room and agreeable to participate. Pt was educated on aphasia and role of speech therapy. Pt was asked biographical questions and given written and verbal cues to participate. Pt was observed to demonstrated improved comprehension with written stimuli as compared to verbal. Pt presented with severe perseverations on numbers (, year) even when task was switched to place/city. Written stimuli was beneficial to discontinue perseverations on numbers. Pt demonstrated inconsistent oral groping when asked to repeat single words. Pt's automatic speech relatively intact. Pt was observed to become frustrated when misarticulating/groping/saying incorrect word. Pt and SO were educated on aphasia and benefit of continued speech therapy services to improve her expressive and receptive language.     Recommend continued aphasia therapy to target expressive and receptive language deficits. Recommend to use written stimuli accompanied with verbal stimuli to increase Pt's comprehension. SLP following as appropriate. Thank you.     Plan    Continue current treatment plan.    Discharge Recommendations: Recommend post-acute placement for additional speech therapy services prior to discharge home     Objective     21 1046   Verbal Expression   Vocal Quality Clear   Verbal Output Automatic Moderate (3)   Verbal Output: Phrases Severe (2)   Verbal Output Conversation Moderate (3)   Verbal Output Functional   (moderate-severe)   Repetition: Single Words Moderate (3)   Apraxia: Verbal Moderate (3)   Perseverations Severe (2)   Word  "Finding Deficits   (moderate-severe)   Paraphasias Moderate (3)   Skilled Intervention Compensatory Strategies;Verbal Cueing;External Aids;Other (See Comments)  (phonemic cueing )   Comments islands of fluent, spontaneous speech   Auditory Comprehension   Yes / No Questions: Personal Information Within Functional Limits (6-7)   Yes / No Questions: General Information Within Functional Limits (6-7)   Comments Benefits from written external aids    Reading Comprehension   Reading Words Minimal (4)   Comments Read single words for field of three questions    Cognitive-Linguistic   Level of Consciousness Alert   Social / Pragmatic Communication   Comments Appears intact   Voice   Comments WFL   Recommended Route of Medication Administration   Medication Administration  Crush all Medications in Puree   Patient / Family Goals   Patient / Family Goal #1 \"I had this happen before\"   Goal #1 Outcome Progressing as expected   Short Term Goals   Short Term Goal # 1 4/2 revised Pt will consume LQ3/MT2 with 1-1 feeding and use of posted and recommended swallow strategies.    Goal Outcome # 1   (Not targeted today)   Short Term Goal # 1 B  NEW 4/8: Patient will consume MM5/MT2 diet with 1:1 supervision with no overt s/sx of aspiration.    Goal Outcome  # 1 B   (Not targeted today)   Short Term Goal # 2 Pt will improve functional communication skills with use of multimodal communication strategies to make wants/needs known given moderate support,   Goal Outcome # 2  Progressing as expected   Short Term Goal # 3 Pt will follow 1-step commands with 90% accuracy given min cues.   Goal Outcome  # 3   (Not targeted today)         "

## 2021-04-09 NOTE — FACE TO FACE
Face to Face Supporting Documentation - Home Health    The encounter with this patient was in whole or in part the primary reason for home health admission.    Date of encounter:   Patient:                    MRN:                       YOB: 2019  Graciela Desai  [id number]  1950     Home health to see patient for:  Skilled Nursing care for assessment, interventions & education    Skilled need for:  Comment: dunn management, post surgical care    Skilled nursing interventions to include:  Line/Drain/Airway education and care    Homebound status evidenced by:  Needs the assistance of another person in order to leave the home. Leaving home requires a considerable and taxing effort. There is a normal inability to leave the home.    Community Physician to provide follow up care: Shan Tuttle M.D.     Optional Interventions? No      I certify the face to face encounter for this home health care referral meets the CMS requirements and the encounter/clinical assessment with the patient was, in whole, or in part, for the medical condition(s) listed above, which is the primary reason for home health care. Based on my clinical findings: the service(s) are medically necessary, support the need for home health care, and the homebound criteria are met.  I certify that this patient has had a face to face encounter by myself.  Deon Torres M.D. - NPI: [id number]

## 2021-04-09 NOTE — PALLIATIVE CARE
Palliative Care follow-up  Pt's Advance Directive is complete and scanned into Epic. To locate the AD, please hover the cursor over the patient's code status to find all linked ACP documents. Original provided to pt's SO Star at bedside. All questions answered.       Updated: BS RN    Plan: AD is complete. Pt's SO Star is DPOA.     Thank you for allowing Palliative Care to support this patient and family. Contact [phone] for additional assistance, change in patient status, or with any questions/concerns.

## 2021-04-09 NOTE — PROGRESS NOTES
Received report from day shift RNCodi. Assumed care of pt. Pt reports no pain or needs at this time. Updated pt on plan of care. Pt resting comfortably in bed. Neuro status at baseline per previous shift. Educated on use of call light. Hourly rounding  in place.

## 2021-04-10 NOTE — PROGRESS NOTES
Discharge instructions reviewed with patient and significant other, Star. Pt has walker and HH set up with [facility]. This Rn reviewed dunn care and patient switching leg bag to larger dunn drainage bag at night. Leg bag in place per bedside RN and patient given larger dunn bag for night. Star and patient instructed to keep dunn below bladder to prevent back flow. Pt has urology appt next week. Meds to beds delivered. Core measure sheet completed by Veronica SALOMON. Stroke information given to patient. Pt and Star educated on diet- minced and moist with thickened liquids recommended. Star to report to PCP if noticing signs of aspiration with patient. Questions answered. Pt transported home by Star.

## 2021-04-10 NOTE — DISCHARGE PLANNING
Meds-to-Beds: Discharge prescription orders listed below delivered to patient at discharge gerald. RN Emmanuel notified. Patient counseled. Patient elected to have co-payment billed to patient account.      Graciela Desai   Home Medication Instructions POLO:[id number]    Printed on:04/09/21 1733   Medication Information                      amLODIPine (NORVASC) 5 MG Tab  Take 1 tablet by mouth every day for 30 days.             aspirin 81 MG EC tablet  Take 1 tablet by mouth every day for 30 days.             atorvastatin (LIPITOR) 40 MG Tab  Take 1 tablet by mouth every day for 30 days.             nitrofurantoin (MACROBID) 100 MG Cap  Take 1 capsule by mouth 2 times a day for 5 days.             oxyCODONE immediate-release (ROXICODONE) 5 MG Tab  Take 1 tablet by mouth every 6 hours as needed for Severe Pain for up to 7 days.                 Su Mckeon, PharmD

## 2021-04-14 ENCOUNTER — DOCUMENTATION (OUTPATIENT)
Dept: OTHER | Facility: MEDICAL CENTER | Age: 71
End: 2021-04-14

## 2021-04-14 NOTE — PROGRESS NOTES
Confirmed with Jenna at [facility] that Graciela is scheduled to follow up with Dr. Quinteros on 4/22/21.

## 2021-04-19 ENCOUNTER — NURSE TRIAGE (OUTPATIENT)
Dept: HEALTH INFORMATION MANAGEMENT | Facility: OTHER | Age: 71
End: 2021-04-19

## 2021-04-19 ENCOUNTER — TELEPHONE (OUTPATIENT)
Dept: HEALTH INFORMATION MANAGEMENT | Facility: OTHER | Age: 71
End: 2021-04-19

## 2021-04-19 ENCOUNTER — TELEPHONE (OUTPATIENT)
Dept: MEDICAL GROUP | Facility: MEDICAL CENTER | Age: 71
End: 2021-04-19

## 2021-04-19 NOTE — TELEPHONE ENCOUNTER
Nurse received question from CHW regarding patient needing assistance with resources after recent discharge from hospital for follow-up on cancer diagnosis. Nurse CM did review chart and see recent notes from nurse triage. This CM contacted oncology nurse navigator and spoke to Bashir, oncology nurse navigators. Oncology nurse navigator, Johny,  is familiar with the patient and will outreach call for follow-up.

## 2021-04-19 NOTE — TELEPHONE ENCOUNTER
Pt SO calling in to see if ibuprofen is safe to give pt. Stated pt has run out of narcotic medication used for pain management post oncology dx and surgery on 3/25.Pt and SO has not f/u with gyn onc as of today. Reached out to PCP to get guidance on pain management. Spoke with MA and assumed care. They will f/u w/ pt. Called Pt outreach to discuss pt and SO inability to make f/u appts post d/c/ Will f/u with pt.     Reason for Disposition  • Caller requesting a NON-URGENT new prescription or refill and triager unable to refill per department policy    Additional Information  • Negative: Drug overdose and triager unable to answer question  • Negative: Caller requesting information unrelated to medicine  • Negative: Caller requesting a prescription for Strep throat and has a positive culture result  • Negative: Rash while taking a medication or within 3 days of stopping it  • Negative: Immunization reaction suspected  • Negative: Asthma and having symptoms of asthma (cough, wheezing, etc.)  • Negative: Breastfeeding questions about mother's medicines and diet  • Negative: MORE THAN A DOUBLE DOSE of a prescription or over-the-counter (OTC) drug  • Negative: DOUBLE DOSE (an extra dose or lesser amount) of over-the-counter (OTC) drug and any symptoms (e.g., dizziness, nausea, pain, sleepiness)  • Negative: DOUBLE DOSE (an extra dose or lesser amount) of prescription drug and any symptoms (e.g., dizziness, nausea, pain, sleepiness)  • Negative: Took another person's prescription drug  • Negative: DOUBLE DOSE (an extra dose or lesser amount) of prescription drug and NO symptoms (Exception: a double dose of antibiotics)  • Negative: Diabetes drug error or overdose (e.g., took wrong type of insulin or took extra dose)  • Negative: Caller has medication question about med not prescribed by PCP and triager unable to answer question (e.g., compatibility with other med, storage)  • Negative: Request for URGENT new prescription  "or refill of 'essential' medication (i.e., likelihood of harm to patient if not taken) and triager unable to fill per department policy  • Negative: Prescription not at pharmacy and was prescribed today by PCP  • Negative: Pharmacy calling with prescription questions and triager unable to answer question  • Negative: Caller has URGENT medication question about med that PCP prescribed and triager unable to answer question  • Negative: Caller has NON-URGENT medication question about med that PCP prescribed and triager unable to answer question    Answer Assessment - Initial Assessment Questions  1. SYMPTOMS: \"Do you have any symptoms?\"      Pain  2. SEVERITY: If symptoms are present, ask \"Are they mild, moderate or severe?\"      severe at night    Protocols used: MEDICATION QUESTION CALL-A-OH      "

## 2021-04-19 NOTE — TELEPHONE ENCOUNTER
Patient was recently seen in the hospital she was DX with cancer. She just ran out of her Roxicodone and wants to know if she can get a refill of that medication or how much Ibuprofen can she take? She is waiting for her appointment to Oncology may 11th

## 2021-04-20 ENCOUNTER — PATIENT OUTREACH (OUTPATIENT)
Dept: OTHER | Facility: MEDICAL CENTER | Age: 71
End: 2021-04-20

## 2021-04-20 NOTE — PROGRESS NOTES
"Telephone call placed to pt at request of community health worker.  Star, listed as pt's significant other, answered the phone and placed Graciela on when asked.  Graciela sounded as she was unable to talk and Star came back on the phone.  Star explained Graciela is out of pain medicine and \"that thing that was on her leg came off\".  Was able clarify he was referring to a dunn leg bag.  Enquired about home health seeing pt today, which Star said no one had been to the home today. Call placed to [facility] and spoke with nurse, Emma, who said that the one of the APRNs at the office had sent in a refill of Graciela's pain medication.  Also called [facility] and spoke with , Nina, who said she thought someone was to visit the pt today.  She also said she would call the pt to determine needs.  Called back and spoke with Star and let him know about the above conversations. Encouraged Star to contact the pharmacy to verify prescription was ready before leaving the house.  Also discussed that if he felt Graciela was ever unsafe he could call for an ambulance to take her to the hospital.  Star verbalized understanding.  Confirmed with Star the appointment Graciela has on Thursday with Dr. Quinteros.  Star denied transportation barriers.    "

## 2021-04-27 ENCOUNTER — OFFICE VISIT (OUTPATIENT)
Dept: MEDICAL GROUP | Facility: MEDICAL CENTER | Age: 71
End: 2021-04-27
Payer: MEDICARE

## 2021-04-27 VITALS
HEIGHT: 62 IN | DIASTOLIC BLOOD PRESSURE: 74 MMHG | OXYGEN SATURATION: 97 % | HEART RATE: 97 BPM | TEMPERATURE: 98.7 F | RESPIRATION RATE: 16 BRPM | BODY MASS INDEX: 23.19 KG/M2 | WEIGHT: 126 LBS | SYSTOLIC BLOOD PRESSURE: 108 MMHG

## 2021-04-27 DIAGNOSIS — R47.01 EXPRESSIVE APHASIA: ICD-10-CM

## 2021-04-27 DIAGNOSIS — C54.1 ENDOMETRIAL CANCER (HCC): ICD-10-CM

## 2021-04-27 DIAGNOSIS — Z09 HOSPITAL DISCHARGE FOLLOW-UP: ICD-10-CM

## 2021-04-27 DIAGNOSIS — Z86.73 HISTORY OF ISCHEMIC MIDDLE CEREBRAL ARTERY STROKE: ICD-10-CM

## 2021-04-27 DIAGNOSIS — Z96.0 S/P URETERAL STENT PLACEMENT: ICD-10-CM

## 2021-04-27 PROBLEM — N85.01 COMPLEX ENDOMETRIAL HYPERPLASIA: Status: ACTIVE | Noted: 2017-08-24

## 2021-04-27 PROCEDURE — 99213 OFFICE O/P EST LOW 20 MIN: CPT | Performed by: NURSE PRACTITIONER

## 2021-04-27 RX ORDER — OXYCODONE HYDROCHLORIDE 5 MG/1
TABLET ORAL
COMMUNITY
Start: 2021-04-20

## 2021-04-27 ASSESSMENT — FIBROSIS 4 INDEX: FIB4 SCORE: 0.63

## 2021-04-27 NOTE — LETTER
[facility]  Shan Tuttle M.D.  [address] [facility] [address]  [facility] [address]  Fax: [phone]   Authorization for Release/Disclosure of   Protected Health Information   Name: GRACIELA BATISTA : 1950 SSN: [id number]   Address: [address] [place] [address] Phone:    [phone] (home)    I authorize the entity listed below to release/disclose the PHI below to:   [facility]/Shan Tuttle M.D. and ZOHAIB Tinsley   Provider or Entity Name:  [facility]   Address   City, State, [facility]   Phone:      Fax:     Reason for request: continuity of care   Information to be released:    [  ] LAST COLONOSCOPY,  including any PATH REPORT and follow-up  [  ] LAST FIT/COLOGUARD RESULT [  ] LAST DEXA  [  ] LAST MAMMOGRAM  [  ] LAST PAP  [  ] LAST LABS [  ] RETINA EXAM REPORT  [  ] IMMUNIZATION RECORDS  [  ] Release all info      [  ] Check here and initial the line next to each item to release ALL health information INCLUDING  _____ Care and treatment for drug and / or alcohol abuse  _____ HIV testing, infection status, or AIDS  _____ Genetic Testing    DATES OF SERVICE OR TIME PERIOD TO BE DISCLOSED: _____________  I understand and acknowledge that:  * This Authorization may be revoked at any time by you in writing, except if your health information has already been used or disclosed.  * Your health information that will be used or disclosed as a result of you signing this authorization could be re-disclosed by the recipient. If this occurs, your re-disclosed health information may no longer be protected by State or Federal laws.  * You may refuse to sign this Authorization. Your refusal will not affect your ability to obtain treatment.  * This Authorization becomes effective upon signing and will  on (date) __________.      If no date is indicated, this Authorization will  one (1) year from the signature date.    Name: Graciela Batista    Signature:   Date:          4/27/2021       PLEASE FAX REQUESTED RECORDS BACK TO: [phone]

## 2021-04-29 ENCOUNTER — NURSE TRIAGE (OUTPATIENT)
Dept: HEALTH INFORMATION MANAGEMENT | Facility: OTHER | Age: 71
End: 2021-04-29

## 2021-04-29 ENCOUNTER — HOSPITAL ENCOUNTER (EMERGENCY)
Facility: MEDICAL CENTER | Age: 71
End: 2021-04-29
Attending: EMERGENCY MEDICINE
Payer: MEDICARE

## 2021-04-29 ENCOUNTER — TELEPHONE (OUTPATIENT)
Dept: MEDICAL GROUP | Facility: MEDICAL CENTER | Age: 71
End: 2021-04-29

## 2021-04-29 VITALS
HEIGHT: 62 IN | HEART RATE: 84 BPM | TEMPERATURE: 98.9 F | WEIGHT: 129.19 LBS | OXYGEN SATURATION: 93 % | BODY MASS INDEX: 23.77 KG/M2 | SYSTOLIC BLOOD PRESSURE: 111 MMHG | DIASTOLIC BLOOD PRESSURE: 61 MMHG | RESPIRATION RATE: 18 BRPM

## 2021-04-29 DIAGNOSIS — C79.9 METASTATIC MALIGNANT NEOPLASM, UNSPECIFIED SITE (HCC): ICD-10-CM

## 2021-04-29 DIAGNOSIS — C54.1 ENDOMETRIAL CANCER (HCC): ICD-10-CM

## 2021-04-29 DIAGNOSIS — G89.3 CANCER RELATED PAIN: ICD-10-CM

## 2021-04-29 LAB
ALBUMIN SERPL BCP-MCNC: 3 G/DL (ref 3.2–4.9)
ALBUMIN/GLOB SERPL: 0.7 G/DL
ALP SERPL-CCNC: 140 U/L (ref 30–99)
ALT SERPL-CCNC: 17 U/L (ref 2–50)
ANION GAP SERPL CALC-SCNC: 9 MMOL/L (ref 7–16)
ANISOCYTOSIS BLD QL SMEAR: ABNORMAL
AST SERPL-CCNC: 20 U/L (ref 12–45)
BASOPHILS # BLD AUTO: 0.7 % (ref 0–1.8)
BASOPHILS # BLD: 0.05 K/UL (ref 0–0.12)
BILIRUB SERPL-MCNC: 0.4 MG/DL (ref 0.1–1.5)
BUN SERPL-MCNC: 10 MG/DL (ref 8–22)
CALCIUM SERPL-MCNC: 9.1 MG/DL (ref 8.5–10.5)
CHLORIDE SERPL-SCNC: 100 MMOL/L (ref 96–112)
CO2 SERPL-SCNC: 31 MMOL/L (ref 20–33)
COMMENT 1642: NORMAL
CREAT SERPL-MCNC: 0.8 MG/DL (ref 0.5–1.4)
EOSINOPHIL # BLD AUTO: 0.26 K/UL (ref 0–0.51)
EOSINOPHIL NFR BLD: 3.4 % (ref 0–6.9)
ERYTHROCYTE [DISTWIDTH] IN BLOOD BY AUTOMATED COUNT: 66.5 FL (ref 35.9–50)
GLOBULIN SER CALC-MCNC: 4.3 G/DL (ref 1.9–3.5)
GLUCOSE SERPL-MCNC: 92 MG/DL (ref 65–99)
HCT VFR BLD AUTO: 31.8 % (ref 37–47)
HGB BLD-MCNC: 9.7 G/DL (ref 12–16)
IMM GRANULOCYTES # BLD AUTO: 0.04 K/UL (ref 0–0.11)
IMM GRANULOCYTES NFR BLD AUTO: 0.5 % (ref 0–0.9)
LIPASE SERPL-CCNC: 16 U/L (ref 11–82)
LYMPHOCYTES # BLD AUTO: 1.17 K/UL (ref 1–4.8)
LYMPHOCYTES NFR BLD: 15.3 % (ref 22–41)
MACROCYTES BLD QL SMEAR: ABNORMAL
MCH RBC QN AUTO: 24 PG (ref 27–33)
MCHC RBC AUTO-ENTMCNC: 30.5 G/DL (ref 33.6–35)
MCV RBC AUTO: 78.5 FL (ref 81.4–97.8)
MICROCYTES BLD QL SMEAR: ABNORMAL
MONOCYTES # BLD AUTO: 0.75 K/UL (ref 0–0.85)
MONOCYTES NFR BLD AUTO: 9.8 % (ref 0–13.4)
MORPHOLOGY BLD-IMP: NORMAL
NEUTROPHILS # BLD AUTO: 5.4 K/UL (ref 2–7.15)
NEUTROPHILS NFR BLD: 70.3 % (ref 44–72)
NRBC # BLD AUTO: 0 K/UL
NRBC BLD-RTO: 0 /100 WBC
PLATELET # BLD AUTO: 295 K/UL (ref 164–446)
PLATELET BLD QL SMEAR: NORMAL
PMV BLD AUTO: 9.1 FL (ref 9–12.9)
POTASSIUM SERPL-SCNC: 3.9 MMOL/L (ref 3.6–5.5)
PROT SERPL-MCNC: 7.3 G/DL (ref 6–8.2)
RBC # BLD AUTO: 4.05 M/UL (ref 4.2–5.4)
RBC BLD AUTO: PRESENT
SODIUM SERPL-SCNC: 140 MMOL/L (ref 135–145)
WBC # BLD AUTO: 7.7 K/UL (ref 4.8–10.8)

## 2021-04-29 PROCEDURE — 85025 COMPLETE CBC W/AUTO DIFF WBC: CPT

## 2021-04-29 PROCEDURE — 96375 TX/PRO/DX INJ NEW DRUG ADDON: CPT

## 2021-04-29 PROCEDURE — 700111 HCHG RX REV CODE 636 W/ 250 OVERRIDE (IP): Performed by: EMERGENCY MEDICINE

## 2021-04-29 PROCEDURE — 99285 EMERGENCY DEPT VISIT HI MDM: CPT

## 2021-04-29 PROCEDURE — 96374 THER/PROPH/DIAG INJ IV PUSH: CPT

## 2021-04-29 PROCEDURE — 83690 ASSAY OF LIPASE: CPT

## 2021-04-29 PROCEDURE — 80053 COMPREHEN METABOLIC PANEL: CPT

## 2021-04-29 PROCEDURE — 36415 COLL VENOUS BLD VENIPUNCTURE: CPT

## 2021-04-29 RX ORDER — HYDROMORPHONE HYDROCHLORIDE 1 MG/ML
0.5 INJECTION, SOLUTION INTRAMUSCULAR; INTRAVENOUS; SUBCUTANEOUS ONCE
Status: COMPLETED | OUTPATIENT
Start: 2021-04-29 | End: 2021-04-29

## 2021-04-29 RX ORDER — OXYCODONE AND ACETAMINOPHEN 10; 325 MG/1; MG/1
1 TABLET ORAL EVERY 6 HOURS PRN
Qty: 56 TABLET | Refills: 0 | Status: SHIPPED | OUTPATIENT
Start: 2021-04-29 | End: 2021-05-13

## 2021-04-29 RX ORDER — ONDANSETRON 2 MG/ML
4 INJECTION INTRAMUSCULAR; INTRAVENOUS ONCE
Status: COMPLETED | OUTPATIENT
Start: 2021-04-29 | End: 2021-04-29

## 2021-04-29 RX ADMIN — HYDROMORPHONE HYDROCHLORIDE 0.5 MG: 1 INJECTION, SOLUTION INTRAMUSCULAR; INTRAVENOUS; SUBCUTANEOUS at 18:54

## 2021-04-29 RX ADMIN — ONDANSETRON 4 MG: 2 INJECTION INTRAMUSCULAR; INTRAVENOUS at 18:54

## 2021-04-29 ASSESSMENT — FIBROSIS 4 INDEX: FIB4 SCORE: 0.63

## 2021-04-29 ASSESSMENT — PAIN DESCRIPTION - PAIN TYPE: TYPE: ACUTE PAIN

## 2021-04-29 NOTE — DISCHARGE PLANNING
note:  Caregiver Star Desai called to say that pt's medication is not working for pt. Advised Star to follow up with Dr. Tuttle, pt's PCP. Star said they were there yesterday. Star asked to be transferred to Dr. Tuttle's office. Transferred to [id number].

## 2021-04-29 NOTE — ED TRIAGE NOTES
"Graciela Desai  Chief Complaint   Patient presents with   • Abdominal Pain     s/p hysterectomy 1 month ago; dx metastatic endometrial cancer   • Back Pain     lower back pain     Pt arrives to triage by wheelchair. Pt and visitor are poor historians. Per pt chart, pt dx with metastatic endometrial cancer on 3/26/21. Pt has been taking oxycodone at home. Prescribed 2 weeks oxy on 4/21 and only has about 6 pills left today. Pt advised to come to ER by MD Tuttle. Pt in ER today for continued pain that she cannot get under control with oxycodone. Pt drowsy during triage.    Patient informed of triage process and to inform staff of any changes/worsening symptoms. Pt verbalized understanding. Pt denies concerns. Pt back to waiting room.     /78   Pulse (!) 108   Temp 37.3 °C (99.1 °F) (Temporal)   Resp 16   Ht 1.575 m (5' 2\")   Wt 58.6 kg (129 lb 3 oz)   SpO2 98%   "

## 2021-04-29 NOTE — TELEPHONE ENCOUNTER
Star who is the care taker of Graciela called stating that she is experiencing a lot of pain. He stated that she is taking the oxycodone, she started taking 1 pill every 6 hrs and she has started to take 2 pills a day without relief. He stated that the prescription was picked up on 4/21/21 and has 6 pills left. Would like to know if there is anything else you recommend.    Please advise.

## 2021-04-29 NOTE — TELEPHONE ENCOUNTER
"Pt's significant other waiting for call from PCP. Notified that MD would like pt to go to ED for IV pain medication. He will take pt in to ED now.    Reason for Disposition  • Information only question and nurse able to answer    Additional Information  • Negative: Nursing judgment  • Negative: Nursing judgment  • Negative: Nursing judgment  • Negative: Nursing judgment    Answer Assessment - Initial Assessment Questions  1. REASON FOR CALL or QUESTION: \"What is your reason for calling today?\" or \"How can I best help you?\" or \"What question do you have that I can help answer?\"      Waiting for PCP to call back. Advised that PCP would like pt to go to ED.    Protocols used: INFORMATION ONLY CALL - NO TRIAGE-A-OH      "

## 2021-04-29 NOTE — TELEPHONE ENCOUNTER
"Pt had a hysterectomy with two strokes after and found out she had cancer. Pt taking oxycodone, but it is not helping her at all. They have left a message for the MA. Called over to office to see if they could call her as soon as possible because of her pain level.    Reason for Disposition  • Request for URGENT new prescription or refill of 'essential' medication (i.e., likelihood of harm to patient if not taken) and triager unable to fill per department policy    Additional Information  • Negative: Drug overdose and triager unable to answer question  • Negative: Caller requesting information unrelated to medicine  • Negative: Caller requesting a prescription for Strep throat and has a positive culture result  • Negative: Rash while taking a medication or within 3 days of stopping it  • Negative: Immunization reaction suspected  • Negative: Asthma and having symptoms of asthma (cough, wheezing, etc.)  • Negative: Breastfeeding questions about mother's medicines and diet  • Negative: MORE THAN A DOUBLE DOSE of a prescription or over-the-counter (OTC) drug  • Negative: DOUBLE DOSE (an extra dose or lesser amount) of over-the-counter (OTC) drug and any symptoms (e.g., dizziness, nausea, pain, sleepiness)  • Negative: DOUBLE DOSE (an extra dose or lesser amount) of prescription drug and any symptoms (e.g., dizziness, nausea, pain, sleepiness)  • Negative: Took another person's prescription drug  • Negative: DOUBLE DOSE (an extra dose or lesser amount) of prescription drug and NO symptoms (Exception: a double dose of antibiotics)  • Negative: Diabetes drug error or overdose (e.g., took wrong type of insulin or took extra dose)  • Negative: Caller has medication question about med not prescribed by PCP and triager unable to answer question (e.g., compatibility with other med, storage)    Answer Assessment - Initial Assessment Questions  1. SYMPTOMS: \"Do you have any symptoms?\"      Extreme pain  2. SEVERITY: If symptoms " "are present, ask \"Are they mild, moderate or severe?\"      severe    Protocols used: MEDICATION QUESTION CALL-A-OH      "

## 2021-04-30 NOTE — ED NOTES
DC home with written and verbal instructions regarding f/u, activity and RX. Verbalized understanding, ambulated out with  with all belongings.

## 2021-04-30 NOTE — ED PROVIDER NOTES
ED Provider Note    CHIEF COMPLAINT  Chief Complaint   Patient presents with   • Abdominal Pain     s/p hysterectomy 1 month ago; dx metastatic endometrial cancer   • Back Pain     lower back pain       HPI  Graciela Irlanda Desai is a 71 y.o. female who presents emergency room today brought in with her significant other with complaints of abdominal and back pain.  Patient had a hysterectomy performed on 3/29 for endometrial cancer with metastasis to the back and brain/per her caregiver.  She was sent home and told that chemotherapy and radiation would not be an option for her she does not however want hospice and she does state that she is DNR/DNI status and her significant other agrees with this and he is her medical power of .  Patient would like to have pain control and her medication at home was not helping her she is on 5 mg oxycodone which she takes and only helps her for about 2 hours and the pain comes back.  Pain is mainly over her abdomen and back area lower back where she has metastasis of the cancer.  Currently rated at 8/10.    REVIEW OF SYSTEMS  See HPI for further details. All other systems are negative.     PAST MEDICAL HISTORY  Past Medical History:   Diagnosis Date   • Hypertension        FAMILY HISTORY  [unfilled]    SOCIAL HISTORY  Social History     Socioeconomic History   • Marital status:      Spouse name: Not on file   • Number of children: Not on file   • Years of education: Not on file   • Highest education level: Not on file   Occupational History   • Not on file   Tobacco Use   • Smoking status: Former Smoker     Packs/day: 2.00     Years: 1.00     Pack years: 2.00     Types: Cigarettes     Start date: 1996     Quit date: 1997     Years since quittin.3   • Smokeless tobacco: Never Used   Substance and Sexual Activity   • Alcohol use: No   • Drug use: No   • Sexual activity: Not on file   Other Topics Concern   • Not on file   Social History Narrative   • Not on file      Social Determinants of Health     Financial Resource Strain:    • Difficulty of Paying Living Expenses:    Food Insecurity:    • Worried About Running Out of Food in the Last Year:    • Ran Out of Food in the Last Year:    Transportation Needs:    • Lack of Transportation (Medical):    • Lack of Transportation (Non-Medical):    Physical Activity:    • Days of Exercise per Week:    • Minutes of Exercise per Session:    Stress:    • Feeling of Stress :    Social Connections:    • Frequency of Communication with Friends and Family:    • Frequency of Social Gatherings with Friends and Family:    • Attends L[employer] day Services:    • Active Member of Clubs or Organizations:    • Attends Club or Organization Meetings:    • Marital Status:    Intimate Partner Violence:    • Fear of Current or Ex-Partner:    • Emotionally Abused:    • Physically Abused:    • Sexually Abused:        SURGICAL HISTORY  Past Surgical History:   Procedure Laterality Date   • PB TOTAL ABDOM HYSTERECTOMY N/A 3/29/2021    Procedure: HYSTERECTOMY, TOTAL, ABDOMINAL-;  Surgeon: Shonda Quinteros M.D.;  Location: [facility];  Service: Gynecology Oncology   • PB REMOVAL OF OMENTUM N/A 3/29/2021    Procedure: OMENTECTOMY;  Surgeon: Shonda Quinteros M.D.;  Location: [facility];  Service: Gynecology Oncology   • SALPINGO OOPHORECTOMY Bilateral 3/29/2021    Procedure: SALPINGO-OOPHORECTOMY;  Surgeon: Shonda Quinteros M.D.;  Location: [facility];  Service: Gynecology Oncology   • NODE DISSECTION Bilateral 3/29/2021    Procedure: Paraaortic and pelvic Lymph Node dissection;  Surgeon: Shonda Quinteros M.D.;  Location: [facility];  Service: Gynecology Oncology   • STENT PLACEMENT Bilateral 3/29/2021    Procedure: Bilateral Ureterolysis, Cystotomy Repair,  Bilateral Ureteral Stent placement;  Surgeon: Shonda Quinteros M.D.;  Location: [facility];  Service: Gynecology Oncology   • APPENDECTOMY     • PRIMARY C  "SECTION         CURRENT MEDICATIONS  Home Medications    **Home medications have not yet been reviewed for this encounter**         ALLERGIES  No Known Allergies    PHYSICAL EXAM  VITAL SIGNS: /61   Pulse 84   Temp 37.2 °C (98.9 °F) (Oral)   Resp 18   Ht 1.575 m (5' 2\")   Wt 58.6 kg (129 lb 3 oz)   SpO2 93%   BMI 23.63 kg/m²       Constitutional: Well developed, Well nourished,  acute distress, Non-toxic appearance.   HENT: Normocephalic, Atraumatic, Bilateral external ears normal, Oropharynx moist, No oral exudates, Nose normal.   Eyes: PERRLA, EOMI, Conjunctiva normal, No discharge.   Neck: Normal range of motion, No tenderness, Supple, No stridor.   Lymphatic: No lymphadenopathy noted.   Cardiovascular: Normal heart rate, Normal rhythm, No murmurs, No rubs, No gallops.   Thorax & Lungs: Normal breath sounds, No respiratory distress, No wheezing, No chest tenderness.   Abdomen: Bowel sounds normal, Soft, lower quadrant tenderness, No masses, No pulsatile masses.  No rebound, guarding or peritoneal signs  Skin: Warm, Dry, No erythema, No rash.   Back: Lumbar tenderness, No CVA tenderness.  No deformity noted or signs of infection.    Extremities: Intact distal pulses, lower extremity minimal bilateral edema, No tenderness, No cyanosis, No clubbing.   Musculoskeletal: Good range of motion in all major joints. No tenderness to palpation or major deformities noted.   Neurologic: Alert & oriented x 3, Normal motor function, Normal sensory function, No focal deficits noted.   Psychiatric: Affect normal, Judgment normal, Mood normal.       COURSE & MEDICAL DECISION MAKING  Pertinent Labs & Imaging studies reviewed. (See chart for details)  Review established and patient was given Dilaudid 25 mg with Zofran 4 mg with complete resolution of her pain.  On reexamination her pain is gone and is currently at a 0/10.  I will increase her medication to 10 mg / 325 mg of Percocet.  She has appointment to see her " surgeon Dr. Quinteros on 5/13.  She understands that this is opiate.  [place]  reviewed.  Patient is however cancer pain and end-stage with stage IV endometrial cancer.  She is released in a stable and improved condition as above to home in the care of her caregiver/significant other.  They verbalized understand structures need for follow-up.    FINAL IMPRESSION  1.  Endometrial cancer with metastasis  2.  Abdominal/back pain secondary #1  3.  Cancer pain         Electronically signed by: Doug Gannon D.O., 4/29/2021 11:37 PM

## 2021-05-05 ENCOUNTER — APPOINTMENT (OUTPATIENT)
Dept: RADIOLOGY | Facility: MEDICAL CENTER | Age: 71
End: 2021-05-05
Attending: EMERGENCY MEDICINE
Payer: MEDICARE

## 2021-05-05 ENCOUNTER — HOSPITAL ENCOUNTER (EMERGENCY)
Facility: MEDICAL CENTER | Age: 71
End: 2021-05-05
Attending: EMERGENCY MEDICINE
Payer: MEDICARE

## 2021-05-05 ENCOUNTER — APPOINTMENT (OUTPATIENT)
Dept: RADIOLOGY | Facility: MEDICAL CENTER | Age: 71
End: 2021-05-05
Attending: STUDENT IN AN ORGANIZED HEALTH CARE EDUCATION/TRAINING PROGRAM
Payer: MEDICARE

## 2021-05-05 VITALS
BODY MASS INDEX: 23.92 KG/M2 | SYSTOLIC BLOOD PRESSURE: 110 MMHG | HEIGHT: 62 IN | RESPIRATION RATE: 20 BRPM | DIASTOLIC BLOOD PRESSURE: 66 MMHG | HEART RATE: 85 BPM | TEMPERATURE: 98.8 F | OXYGEN SATURATION: 100 % | WEIGHT: 130 LBS

## 2021-05-05 DIAGNOSIS — M54.50 ACUTE MIDLINE LOW BACK PAIN WITHOUT SCIATICA: ICD-10-CM

## 2021-05-05 DIAGNOSIS — C79.51 METASTATIC CANCER TO SPINE (HCC): ICD-10-CM

## 2021-05-05 DIAGNOSIS — C54.1 ENDOMETRIAL CANCER (HCC): ICD-10-CM

## 2021-05-05 LAB
ALBUMIN SERPL BCP-MCNC: 2.7 G/DL (ref 3.2–4.9)
ALBUMIN/GLOB SERPL: 0.6 G/DL
ALP SERPL-CCNC: 140 U/L (ref 30–99)
ALT SERPL-CCNC: 19 U/L (ref 2–50)
ANION GAP SERPL CALC-SCNC: 11 MMOL/L (ref 7–16)
ANISOCYTOSIS BLD QL SMEAR: ABNORMAL
AST SERPL-CCNC: 38 U/L (ref 12–45)
BASOPHILS # BLD AUTO: 0.7 % (ref 0–1.8)
BASOPHILS # BLD: 0.06 K/UL (ref 0–0.12)
BILIRUB SERPL-MCNC: 0.2 MG/DL (ref 0.1–1.5)
BUN SERPL-MCNC: 13 MG/DL (ref 8–22)
CALCIUM SERPL-MCNC: 8.5 MG/DL (ref 8.5–10.5)
CHLORIDE SERPL-SCNC: 101 MMOL/L (ref 96–112)
CO2 SERPL-SCNC: 29 MMOL/L (ref 20–33)
COMMENT 1642: NORMAL
CREAT SERPL-MCNC: 0.8 MG/DL (ref 0.5–1.4)
EKG IMPRESSION: NORMAL
EOSINOPHIL # BLD AUTO: 0.3 K/UL (ref 0–0.51)
EOSINOPHIL NFR BLD: 3.7 % (ref 0–6.9)
ERYTHROCYTE [DISTWIDTH] IN BLOOD BY AUTOMATED COUNT: 67.6 FL (ref 35.9–50)
GLOBULIN SER CALC-MCNC: 4.3 G/DL (ref 1.9–3.5)
GLUCOSE SERPL-MCNC: 102 MG/DL (ref 65–99)
HCT VFR BLD AUTO: 29.9 % (ref 37–47)
HGB BLD-MCNC: 9.1 G/DL (ref 12–16)
HYPOCHROMIA BLD QL SMEAR: ABNORMAL
IMM GRANULOCYTES # BLD AUTO: 0.04 K/UL (ref 0–0.11)
IMM GRANULOCYTES NFR BLD AUTO: 0.5 % (ref 0–0.9)
LYMPHOCYTES # BLD AUTO: 1.45 K/UL (ref 1–4.8)
LYMPHOCYTES NFR BLD: 17.9 % (ref 22–41)
MCH RBC QN AUTO: 24 PG (ref 27–33)
MCHC RBC AUTO-ENTMCNC: 30.4 G/DL (ref 33.6–35)
MCV RBC AUTO: 78.9 FL (ref 81.4–97.8)
MICROCYTES BLD QL SMEAR: ABNORMAL
MONOCYTES # BLD AUTO: 1.05 K/UL (ref 0–0.85)
MONOCYTES NFR BLD AUTO: 13 % (ref 0–13.4)
MORPHOLOGY BLD-IMP: NORMAL
NEUTROPHILS # BLD AUTO: 5.18 K/UL (ref 2–7.15)
NEUTROPHILS NFR BLD: 64.2 % (ref 44–72)
NRBC # BLD AUTO: 0 K/UL
NRBC BLD-RTO: 0 /100 WBC
PLATELET # BLD AUTO: 365 K/UL (ref 164–446)
PLATELET BLD QL SMEAR: NORMAL
PMV BLD AUTO: 9.2 FL (ref 9–12.9)
POLYCHROMASIA BLD QL SMEAR: NORMAL
POTASSIUM SERPL-SCNC: 4.3 MMOL/L (ref 3.6–5.5)
PROT SERPL-MCNC: 7 G/DL (ref 6–8.2)
RBC # BLD AUTO: 3.79 M/UL (ref 4.2–5.4)
RBC BLD AUTO: PRESENT
SODIUM SERPL-SCNC: 141 MMOL/L (ref 135–145)
TARGETS BLD QL SMEAR: NORMAL
WBC # BLD AUTO: 8.1 K/UL (ref 4.8–10.8)

## 2021-05-05 PROCEDURE — 72128 CT CHEST SPINE W/O DYE: CPT | Mod: MG

## 2021-05-05 PROCEDURE — 80053 COMPREHEN METABOLIC PANEL: CPT

## 2021-05-05 PROCEDURE — 93971 EXTREMITY STUDY: CPT | Mod: 26,LT | Performed by: INTERNAL MEDICINE

## 2021-05-05 PROCEDURE — 93005 ELECTROCARDIOGRAM TRACING: CPT | Performed by: EMERGENCY MEDICINE

## 2021-05-05 PROCEDURE — 96374 THER/PROPH/DIAG INJ IV PUSH: CPT

## 2021-05-05 PROCEDURE — 85025 COMPLETE CBC W/AUTO DIFF WBC: CPT

## 2021-05-05 PROCEDURE — 72131 CT LUMBAR SPINE W/O DYE: CPT

## 2021-05-05 PROCEDURE — 36415 COLL VENOUS BLD VENIPUNCTURE: CPT

## 2021-05-05 PROCEDURE — 700111 HCHG RX REV CODE 636 W/ 250 OVERRIDE (IP): Performed by: STUDENT IN AN ORGANIZED HEALTH CARE EDUCATION/TRAINING PROGRAM

## 2021-05-05 PROCEDURE — 93971 EXTREMITY STUDY: CPT | Mod: LT

## 2021-05-05 PROCEDURE — 99285 EMERGENCY DEPT VISIT HI MDM: CPT

## 2021-05-05 RX ORDER — MORPHINE SULFATE 4 MG/ML
2 INJECTION, SOLUTION INTRAMUSCULAR; INTRAVENOUS ONCE
Status: COMPLETED | OUTPATIENT
Start: 2021-05-05 | End: 2021-05-05

## 2021-05-05 RX ADMIN — MORPHINE SULFATE 2 MG: 4 INJECTION INTRAVENOUS at 16:56

## 2021-05-05 ASSESSMENT — LIFESTYLE VARIABLES
DOES PATIENT WANT TO STOP DRINKING: NO
CONSUMPTION TOTAL: INCOMPLETE
HAVE YOU EVER FELT YOU SHOULD CUT DOWN ON YOUR DRINKING: NO
HAVE PEOPLE ANNOYED YOU BY CRITICIZING YOUR DRINKING: NO
DO YOU DRINK ALCOHOL: NO
TOTAL SCORE: 0
TOTAL SCORE: 0
EVER HAD A DRINK FIRST THING IN THE MORNING TO STEADY YOUR NERVES TO GET RID OF A HANGOVER: NO
EVER FELT BAD OR GUILTY ABOUT YOUR DRINKING: NO
TOTAL SCORE: 0

## 2021-05-05 ASSESSMENT — FIBROSIS 4 INDEX: FIB4 SCORE: 1.13

## 2021-05-05 NOTE — ED TRIAGE NOTES
Pt here via EMS ([facility]) for acute sudden onset of 10/10 chest pain and back pain after waking up from a nap. States stage 4 uterus cancer; no chemo, doenst want treatment; is a FULL CODE. bg 168. Here with a hx of x 2 strokes following a hysterectomy in march; baseline dysphagia. Received 100mcg fentanyl, 4 zofran, 10mg home Oxycodone. New pedal edema added. [facility] nurses follow her

## 2021-05-05 NOTE — ED PROVIDER NOTES
ED Provider Note  CHIEF COMPLAINT()  Chief Complaint   Patient presents with   • Chest Pain   • Back Pain       HPI  Graciela Desai is a 71 y.o. female with Hx of stage 4 endometrial cancer.  Presents to the ED for acute onset back pain, patient was in bed when the pain started.  Pain was very severe 10/10 in intensity, referred to her lower thoracic and lumbar spine.  She stated that this pain is new, she has been taking Oxy 10 every 6 hours, but was not enough to control her acute pain.  Patient was given fentanyl by EMS which helped with the pain, then she was also given morphine in the emergency department.    REVIEW OF SYSTEMS(1/10)  Pertinent positives include: Severe back pain, localized thoracic and lumbar spine.  Pertinent negatives include: Fever, chills, night sweats, chest pain, palpitations, or shortness of breath.   All other systems are negative.     PAST MEDICAL HISTORY(PFS1,2)  Past Medical History:   Diagnosis Date   • Hypertension        FAMILY HISTORY  Family History   Problem Relation Age of Onset   • Alcohol/Drug Mother    • Heart Disease Mother    • No Known Problems Father        SOCIAL HISTORY  Social History     Tobacco Use   • Smoking status: Former Smoker     Packs/day: 2.00     Years: 1.00     Pack years: 2.00     Types: Cigarettes     Start date: 1996     Quit date: 1997     Years since quittin.3   • Smokeless tobacco: Never Used   Substance Use Topics   • Alcohol use: No   • Drug use: No     Social History     Substance and Sexual Activity   Drug Use No       SURGICAL HISTORY  Past Surgical History:   Procedure Laterality Date   • PB TOTAL ABDOM HYSTERECTOMY N/A 3/29/2021    Procedure: HYSTERECTOMY, TOTAL, ABDOMINAL-;  Surgeon: Shonda Quinteros M.D.;  Location: SUR[facility]ERY [facility];  Service: Gynecology Oncology   • PB REMOVAL OF OMENTUM N/A 3/29/2021    Procedure: OMENTECTOMY;  Surgeon: Shonda Quinteros M.D.;  Location: SURGERY [facility];  Service: Gynecology  "Oncology   • SALPINGO OOPHORECTOMY Bilateral 3/29/2021    Procedure: SALPINGO-OOPHORECTOMY;  Surgeon: Shonda Quinteros M.D.;  Location: SURGERY [facility];  Service: Gynecology Oncology   • NODE DISSECTION Bilateral 3/29/2021    Procedure: Paraaortic and pelvic Lymph Node dissection;  Surgeon: Shonda Quinteros M.D.;  Location: SURGERY [facility];  Service: Gynecology Oncology   • STENT PLACEMENT Bilateral 3/29/2021    Procedure: Bilateral Ureterolysis, Cystotomy Repair,  Bilateral Ureteral Stent placement;  Surgeon: Shonda Quinteros M.D.;  Location: SURGERY [facility];  Service: Gynecology Oncology   • APPENDECTOMY     • PRIMARY C SECTION         CURRENT MEDICATIONS  Home Medications    **Home medications have not yet been reviewed for this encounter**         ALLERGIES  No Known Allergies    PHYSICAL EXAM  VITAL SIGNS: /66   Pulse (!) 112   Temp 37.1 °C (98.8 °F) (Temporal)   Resp 13   Ht 1.575 m (5' 2\")   Wt 59 kg (130 lb)   SpO2 95%   BMI 23.78 kg/m²   Constitutional: Well developed, Well nourished, mild acute distress.  HENT: Normocephalic, atraumatic, bilateral external ears normal, oropharynx moist, No exudates or erythema.   Eyes: PERRLA, conjunctiva pink, no scleral icterus.   Cardiovascular: Regular rate and rhythm, no murmurs or gallops  Respiratory: Clear breath sounds, no wheezing heard.  Gastrointestinal: Abdomen is soft nontender, healed medial abdominal incision, normal bowel sounds, no hepatosplenomegaly noted.  Skin: No erythema, no rash.   BACK: Mild tenderness thoracic and lumbar spine, no deformities noted.  Genitourinary:  No costovertebral angle tenderness.   Neurologic: Alert & oriented x 3, cranial nerves 2-12 intact by passive exam.  No focal deficit noted.  Psychiatric: Affect normal, Judgment normal, Mood normal.     DIFFERENTIAL DIAGNOSIS:  Compression fracture, metastatic bone disease.    EKG  Sinus tachycardia, rate 109.    RADIOLOGY/PROCEDURES  US-EXTREMITY VENOUS " LOWER UNILAT LEFT    (Results Pending)       LABORATORY: Reviewed as below.  Results for orders placed or performed during the hospital encounter of 21   EKG (NOW)   Result Value Ref Range    Report       [facility] Emergency Dept.    Test Date:  2021  Pt Name:    NOAH BATISTA                 Department: ER  MRN:        [id number]                      Room:       [facility]  Gender:     Female                       Technician: [id number]  :        1950                   Requested By:ER TRIAGE PROTOCOL  Order #:    [id number]                    Reading MD:    Measurements  Intervals                                Axis  Rate:       109                          P:          49  MT:         140                          QRS:        -8  QRSD:       64                           T:          41  QT:         324  QTc:        437    Interpretive Statements  SINUS TACHYCARDIA  PROBABLE POSTERIOR INFARCT  ARTIFACT IN LEAD(S) I,aVR,aVL,V1,V2  Compared to ECG 2021 21:43:25  Sinus rhythm no longer present  Left ventricular hypertrophy no longer present  Myocardial infarct finding still present         INTERVENTIONS:  Medications   morphine (pf) 4 mg/mL injection 2 mg (has no administration in time range)     Response: Patient responded well to milligrams of morphine.  She remained with good pain control during hospital stay.    COURSE & MEDICAL DECISION MAKING  Discussed with patient and friend.  Mrs. Batista is a 71-year-old female who was transferred to the hospital by EMS for acute back pain, she has a history of stage IV endometrial cancer.  Lab work was only remarkable for stable anemia, and stable elevated alkaline phosphatase.  Due to concerns of lower extremity edema worse on the left side patient had a ultrasound of the lower extremities which did not show any evidence of DVTs.  CT of the spine showed:  1.  Lytic metastases within the left ilium and the L5 inferior aspect of L5  2.  Sclerotic  metastases within L1 and L2  3.  No pathologic fracture  4.  Bilateral ureteral stent  5.  Cystic right pelvic mass  6.  Nonobstructive 7 mm left renal calculus                  After talking to patient about the results she stated that she would like to go home.  We recommend to continue oxycodone 10 mg, but changing intervals recommending taking every 4 hours and also recommended taking up to 3.5 g of Tylenol daily.  She will follow up with her PCP to assure adequate pain control.    Review nursing notes and vital signs a final time 3:48 PM    PLAN:  Discharged home  Follow-up with PCP to reassure adequate pain control.    CONDITION: Stable.    FINAL IMPRESSION  1.  Metastatic bone disease  2.  Back pain  3.  Endometrial cancer stage IV  4.  Stable chronic anemia    Electronically signed by: Shane Berman M.D., 5/5/2021 3:48 PM

## 2021-05-06 NOTE — ED NOTES
Pt d/c to son care, pt signed d/c paperwork, pt ambulated with assistance x 1 to wheel chair. Pt taken out of ED to son vehicle in wheel chair. PIV removed with catheter intact. Pt and pt son given d/c instructions and verbalized understanding.

## 2021-11-02 NOTE — CARE PLAN
Problem: Communication  Goal: The ability to communicate needs accurately and effectively will improve  Outcome: PROGRESSING AS EXPECTED     Problem: Pain Management  Goal: Pain level will decrease to patient's comfort goal  Outcome: PROGRESSING AS EXPECTED     Problem: Infection  Goal: Will remain free from infection  Outcome: PROGRESSING AS EXPECTED     Problem: Venous Thromboembolism (VTW)/Deep Vein Thrombosis (DVT) Prevention:  Goal: Patient will participate in Venous Thrombosis (VTE)/Deep Vein Thrombosis (DVT)Prevention Measures  Outcome: PROGRESSING AS EXPECTED     Problem: Psychosocial Needs:  Goal: Level of anxiety will decrease  Outcome: PROGRESSING AS EXPECTED     Problem: Fluid Volume:  Goal: Will maintain balanced intake and output  Outcome: PROGRESSING AS EXPECTED     Problem: Respiratory:  Goal: Respiratory status will improve  Outcome: PROGRESSING AS EXPECTED     Problem: Bowel/Gastric:  Goal: Normal bowel function is maintained or improved  Outcome: PROGRESSING AS EXPECTED  Goal: Will not experience complications related to bowel motility  Outcome: PROGRESSING AS EXPECTED     Problem: Urinary Elimination:  Goal: Ability to reestablish a normal urinary elimination pattern will improve  Outcome: PROGRESSING AS EXPECTED     Problem: Skin Integrity  Goal: Risk for impaired skin integrity will decrease  Outcome: PROGRESSING AS EXPECTED     Problem: Mobility  Goal: Risk for activity intolerance will decrease  Outcome: PROGRESSING AS EXPECTED     Problem: Medication  Goal: Compliance with prescribed medication will improve  Outcome: PROGRESSING AS EXPECTED     Problem: Knowledge Deficit  Goal: Knowledge of disease process/condition, treatment plan, diagnostic tests, and medications will improve  Outcome: PROGRESSING AS EXPECTED  Goal: Knowledge of the prescribed therapeutic regimen will improve  Outcome: PROGRESSING AS EXPECTED     Problem: Discharge Barriers/Planning  Goal: Patient's continuum of care  needs will be met  Outcome: PROGRESSING AS EXPECTED      Xenograft Text: The defect edges were debeveled with a #15 scalpel blade.  Given the location of the defect, shape of the defect and the proximity to free margins a xenograft was deemed most appropriate.  The graft was then trimmed to fit the size of the defect.  The graft was then placed in the primary defect and oriented appropriately.

## (undated) DEVICE — STAPLER CONTOUR CURVED GREEN 4.8MM W/STAPLE (3EA/BX)

## (undated) DEVICE — SODIUM CHL. INJ. 0.9% 500ML (24EA/CA 50CA/PF)

## (undated) DEVICE — HEAD HOLDER JUNIOR/ADULT

## (undated) DEVICE — SET LEADWIRE 5 LEAD BEDSIDE DISPOSABLE ECG (1SET OF 5/EA)

## (undated) DEVICE — DRAPE STRLE REG TOWEL 18X24 - (10/BX 4BX/CA)"

## (undated) DEVICE — BLADE SURGICAL #15 - (50/BX 3BX/CA)

## (undated) DEVICE — SET IRRIGATION CYSTOSCOPY TUBE L80 IN (20EA/CA)

## (undated) DEVICE — SUTURE 2-0 VICRYL PLUS SH - 27 INCH (36/BX)

## (undated) DEVICE — DRAPE UNDER BUTTOCK LRG (40/CA)

## (undated) DEVICE — WIRE GUIDE .038 X 150 FLEX - .

## (undated) DEVICE — GLOVE BIOGEL PI INDICATOR SZ 7.5 SURGICAL PF LF -(50/BX 4BX/CA)

## (undated) DEVICE — TUBE NG SALEM SUMP 16FR (50EA/CA)

## (undated) DEVICE — SUTURE 3-0 MONOCRYL PLUS PS-1 - 27 INCH (36/BX)

## (undated) DEVICE — GLOVE COTTON STERILE (50/CA)

## (undated) DEVICE — SUTURE 3-0 VICRYL PLUS SH - 8X 18 INCH (12/BX)

## (undated) DEVICE — LEGGING LITHOTOMY 31 X 48 IN - (2EA/PK 20PK/CA)

## (undated) DEVICE — STAPLER SKIN DISP - (6/BX 10BX/CA) VISISTAT

## (undated) DEVICE — FILTER BLOOD TRANSFUSION - (40/CA) (PALL)

## (undated) DEVICE — GOWN WARMING STANDARD FLEX - (30/CA)

## (undated) DEVICE — RESERVOIR SUCTION 100 CC - SILICONE (20EA/CA)

## (undated) DEVICE — DRAPE LARGE 3 QUARTER - (20/CA)

## (undated) DEVICE — TRAY SKIN SCRUB PVP WET (20EA/CA) PART #DYND70356 DISCONTINUED

## (undated) DEVICE — SUTURE 2-0 ETHILON FS - (36/BX) 18 INCH

## (undated) DEVICE — CLIP MED LG INTNL HRZN TI ESCP - (20/BX)

## (undated) DEVICE — SUTURE 2-0 VICRYL PLUS TP-1 - (24/BX)

## (undated) DEVICE — WIRE GUIDE SENSOR DUAL FLEX - 5/BX

## (undated) DEVICE — TUBING CLEARLINK DUO-VENT - C-FLO (48EA/CA)

## (undated) DEVICE — CHLORAPREP 26 ML APPLICATOR - ORANGE TINT(25/CA)

## (undated) DEVICE — SUTURE 3-0 VICRYL PLUS SH - 27 INCH (36/BX)

## (undated) DEVICE — SWAB CULTURE AMIES ESWAB (50EA/PK)

## (undated) DEVICE — STAPLER 60MM BLUE 3.5MM WITH - STAPLE (3EA/BX)

## (undated) DEVICE — TOWELS CLOTH SURGICAL - (4/PK 20PK/CA)

## (undated) DEVICE — CANISTER SUCTION 3000ML MECHANICAL FILTER AUTO SHUTOFF MEDI-VAC NONSTERILE LF DISP  (40EA/CA)

## (undated) DEVICE — LACTATED RINGERS INJ 1000 ML - (14EA/CA 60CA/PF)

## (undated) DEVICE — TRAY, CV CATH MULTI-LUM.AK-25703

## (undated) DEVICE — DRAPE MEDI-SLUSH STERILE  - (40/CA)

## (undated) DEVICE — PACK MAJOR BASIN - (2EA/CA)

## (undated) DEVICE — SUTURE 3-0 SILK SH C/R 18 IN - (12/BX)

## (undated) DEVICE — BLADE SURGICAL CLIPPER - (50EA/CA)

## (undated) DEVICE — SUTURE 0 VICRYL PLUS CT-2 - 27 INCH (36/BX)

## (undated) DEVICE — SPONGE GAUZESTER 4 X 4 4PLY - (128PK/CA)

## (undated) DEVICE — SENSOR SPO2 NEO LNCS ADHESIVE (20/BX) SEE USER NOTES

## (undated) DEVICE — PAD OR TABLE DA VINCI 2IN X 20IN X 72IN - (12EA/CA)

## (undated) DEVICE — Device

## (undated) DEVICE — SET EXTENSION WITH 2 PORTS (48EA/CA) ***PART #2C8610 IS A SUBSTITUTE*****

## (undated) DEVICE — MASK ANESTHESIA ADULT  - (100/CA)

## (undated) DEVICE — GRAFT MESH SEPRAFILM PRO PACK - 5/BX CONTAINS 6 3X5 PIECES

## (undated) DEVICE — KIT ANESTHESIA W/CIRCUIT & 3/LT BAG W/FILTER (20EA/CA)

## (undated) DEVICE — SET FLUID WARMING STANDARD FLOW - (10/CA)

## (undated) DEVICE — GLOVE BIOGEL PI ORTHO SZ 7.5 PF LF (40PR/BX)

## (undated) DEVICE — SYRINGE 10 ML CONTROL LL (25EA/BX 4BX/CA)

## (undated) DEVICE — KIT RADIAL ARTERY 20GA W/MAX BARRIER AND BIOPATCH  (5EA/CA) #10740 IS FOR THE SET RADIAL ARTERIAL

## (undated) DEVICE — SODIUM CHL IRRIGATION 0.9% 1000ML (12EA/CA)

## (undated) DEVICE — SET BIFURCATED BLOOD - (48EA/CS)

## (undated) DEVICE — SUTURE 0 COATED VICRYL 6-18IN - (12PK/BX)

## (undated) DEVICE — CORETEMP DRAPE FORM-FITTED EASY DROPANDGO DRAPE FOR USE ON THE CORETEMP FLUID MANAGEMENT 56IN X 56IN

## (undated) DEVICE — CANISTER SUCTION RIGID RED 1500CC (40EA/CA)

## (undated) DEVICE — STAPLE 60MM BLUE 3.5MM - ECHELON (12/BX)

## (undated) DEVICE — DRAPE UNDER BUTTOCKS FLUID - (20/CA)

## (undated) DEVICE — CLIP LG INTNL HRZN TI ESCP LGT - (20/BX)

## (undated) DEVICE — GLOVE SZ 6.5 BIOGEL PI MICRO - PF LF (50PR/BX)

## (undated) DEVICE — GOWN SURGICAL X-LARGE ULTRA - FILM-REINFORCED (20/CA)

## (undated) DEVICE — SLEEVE, VASO, THIGH, MED

## (undated) DEVICE — PAD LAP STERILE 18 X 18 - (5/PK 40PK/CA)

## (undated) DEVICE — DRESSING LEUKOMED STERILE 11.75X4IN - (50/CA)

## (undated) DEVICE — TRAY CATHETER FOLEY URINE METER W/STATLOCK 350ML (10EA/CA)

## (undated) DEVICE — DRAPE LAPAROTOMY T SHEET - (12EA/CA)

## (undated) DEVICE — COVER FOOT UNIVERSAL DISP. - (25EA/CA)

## (undated) DEVICE — TRAY SRGPRP PVP IOD WT PRP - (20/CA)

## (undated) DEVICE — ELECTRODE 850 FOAM ADHESIVE - HYDROGEL RADIOTRNSPRNT (50/PK)

## (undated) DEVICE — GOWN SURGEONS X-LARGE - DISP. (30/CA)

## (undated) DEVICE — TRANSDUCER ADULT DISP. SINGLE BONDED STERILE - (20EA/CA)

## (undated) DEVICE — ELECTRODE DUAL RETURN W/ CORD - (50/PK)

## (undated) DEVICE — DRAIN J-VAC 10MM FLAT - (10/CA)

## (undated) DEVICE — BOVIE  BLADE 6 EXTENDED - (50/PK)

## (undated) DEVICE — GLOVE BIOGEL PI INDICATOR SZ 6.5 SURGICAL PF LF - (50/BX 4BX/CA)

## (undated) DEVICE — WATER IRRIGATION STERILE 1000ML (12EA/CA)

## (undated) DEVICE — HEMOSTAT SURG ABSORBABLE - 4 X 8 IN SURGICEL (24EA/CA)

## (undated) DEVICE — GLOVE SZ 7.5 BIOGEL PI MICRO - PF LF (50PR/BX)

## (undated) DEVICE — SUCTION INSTRUMENT YANKAUER BULBOUS TIP W/O VENT (50EA/CA)

## (undated) DEVICE — DRAPE MAYO STAND - (30/CA)

## (undated) DEVICE — PROTECTOR ULNA NERVE - (36PR/CA)

## (undated) DEVICE — SUTURE GENERAL

## (undated) DEVICE — CLIP MED INTNL HRZN TI ESCP - (25/BX)

## (undated) DEVICE — NEPTUNE 4 PORT MANIFOLD - (20/PK)

## (undated) DEVICE — LIGASURE TISSUE FUSION  - SINGLE USE (6/CA)